# Patient Record
Sex: MALE | Race: WHITE | NOT HISPANIC OR LATINO | Employment: OTHER | URBAN - METROPOLITAN AREA
[De-identification: names, ages, dates, MRNs, and addresses within clinical notes are randomized per-mention and may not be internally consistent; named-entity substitution may affect disease eponyms.]

---

## 2017-02-06 ENCOUNTER — TRANSCRIBE ORDERS (OUTPATIENT)
Dept: LAB | Facility: CLINIC | Age: 72
End: 2017-02-06

## 2017-02-06 ENCOUNTER — APPOINTMENT (OUTPATIENT)
Dept: LAB | Facility: CLINIC | Age: 72
End: 2017-02-06
Payer: MEDICARE

## 2017-02-06 DIAGNOSIS — N18.30 CHRONIC KIDNEY DISEASE, STAGE III (MODERATE) (HCC): Primary | ICD-10-CM

## 2017-02-06 DIAGNOSIS — Z79.899 ENCOUNTER FOR LONG-TERM (CURRENT) USE OF OTHER MEDICATIONS: ICD-10-CM

## 2017-02-06 DIAGNOSIS — I10 ESSENTIAL HYPERTENSION, MALIGNANT: ICD-10-CM

## 2017-02-06 LAB
ANION GAP SERPL CALCULATED.3IONS-SCNC: 4 MMOL/L (ref 4–13)
BUN SERPL-MCNC: 18 MG/DL (ref 5–25)
CALCIUM SERPL-MCNC: 9.2 MG/DL (ref 8.3–10.1)
CHLORIDE SERPL-SCNC: 106 MMOL/L (ref 100–108)
CO2 SERPL-SCNC: 28 MMOL/L (ref 21–32)
CREAT SERPL-MCNC: 1.44 MG/DL (ref 0.6–1.3)
GFR SERPL CREATININE-BSD FRML MDRD: 48.2 ML/MIN/1.73SQ M
GLUCOSE SERPL-MCNC: 103 MG/DL (ref 65–140)
POTASSIUM SERPL-SCNC: 4.1 MMOL/L (ref 3.5–5.3)
PSA SERPL-MCNC: 1.4 NG/ML (ref 0–4)
SODIUM SERPL-SCNC: 138 MMOL/L (ref 136–145)

## 2017-02-06 PROCEDURE — G0103 PSA SCREENING: HCPCS

## 2017-02-06 PROCEDURE — 80048 BASIC METABOLIC PNL TOTAL CA: CPT

## 2017-02-06 PROCEDURE — 36415 COLL VENOUS BLD VENIPUNCTURE: CPT

## 2017-02-13 ENCOUNTER — APPOINTMENT (OUTPATIENT)
Dept: LAB | Facility: CLINIC | Age: 72
End: 2017-02-13
Payer: MEDICARE

## 2017-02-13 DIAGNOSIS — N18.30 CHRONIC KIDNEY DISEASE, STAGE III (MODERATE) (HCC): ICD-10-CM

## 2017-02-13 LAB
ANION GAP SERPL CALCULATED.3IONS-SCNC: 9 MMOL/L (ref 4–13)
BACTERIA UR QL AUTO: NORMAL /HPF
BILIRUB UR QL STRIP: NEGATIVE
BUN SERPL-MCNC: 19 MG/DL (ref 5–25)
CALCIUM SERPL-MCNC: 9.2 MG/DL (ref 8.3–10.1)
CHLORIDE SERPL-SCNC: 107 MMOL/L (ref 100–108)
CLARITY UR: CLEAR
CO2 SERPL-SCNC: 26 MMOL/L (ref 21–32)
COLOR UR: YELLOW
CREAT SERPL-MCNC: 1.4 MG/DL (ref 0.6–1.3)
CREAT UR-MCNC: 97.1 MG/DL
GFR SERPL CREATININE-BSD FRML MDRD: 49.8 ML/MIN/1.73SQ M
GLUCOSE SERPL-MCNC: 91 MG/DL (ref 65–140)
GLUCOSE UR STRIP-MCNC: NEGATIVE MG/DL
HGB UR QL STRIP.AUTO: NEGATIVE
HYALINE CASTS #/AREA URNS LPF: NORMAL /LPF
KETONES UR STRIP-MCNC: NEGATIVE MG/DL
LEUKOCYTE ESTERASE UR QL STRIP: ABNORMAL
NITRITE UR QL STRIP: NEGATIVE
NON-SQ EPI CELLS URNS QL MICRO: NORMAL /HPF
PH UR STRIP.AUTO: 6.5 [PH] (ref 4.5–8)
POTASSIUM SERPL-SCNC: 4.6 MMOL/L (ref 3.5–5.3)
PROT UR STRIP-MCNC: NEGATIVE MG/DL
PROT UR-MCNC: 9 MG/DL
PROT/CREAT UR: 0.09 MG/G{CREAT} (ref 0–0.1)
RBC #/AREA URNS AUTO: NORMAL /HPF
SODIUM SERPL-SCNC: 142 MMOL/L (ref 136–145)
SP GR UR STRIP.AUTO: 1.01 (ref 1–1.03)
UROBILINOGEN UR QL STRIP.AUTO: 0.2 E.U./DL
WBC #/AREA URNS AUTO: NORMAL /HPF

## 2017-02-13 PROCEDURE — 80048 BASIC METABOLIC PNL TOTAL CA: CPT

## 2017-02-13 PROCEDURE — 82570 ASSAY OF URINE CREATININE: CPT

## 2017-02-13 PROCEDURE — 36415 COLL VENOUS BLD VENIPUNCTURE: CPT

## 2017-02-13 PROCEDURE — 84156 ASSAY OF PROTEIN URINE: CPT

## 2017-02-13 PROCEDURE — 81001 URINALYSIS AUTO W/SCOPE: CPT

## 2017-02-14 ENCOUNTER — ALLSCRIPTS OFFICE VISIT (OUTPATIENT)
Dept: OTHER | Facility: OTHER | Age: 72
End: 2017-02-14

## 2017-05-26 ENCOUNTER — APPOINTMENT (OUTPATIENT)
Dept: LAB | Facility: CLINIC | Age: 72
End: 2017-05-26
Payer: MEDICARE

## 2017-05-26 ENCOUNTER — TRANSCRIBE ORDERS (OUTPATIENT)
Dept: LAB | Facility: CLINIC | Age: 72
End: 2017-05-26

## 2017-05-26 DIAGNOSIS — D64.9 ANEMIA, UNSPECIFIED: ICD-10-CM

## 2017-05-26 DIAGNOSIS — N18.30 CHRONIC KIDNEY DISEASE, STAGE III (MODERATE) (HCC): ICD-10-CM

## 2017-05-26 DIAGNOSIS — Z79.899 ENCOUNTER FOR LONG-TERM (CURRENT) USE OF OTHER MEDICATIONS: Primary | ICD-10-CM

## 2017-05-26 DIAGNOSIS — I10 ESSENTIAL HYPERTENSION, MALIGNANT: ICD-10-CM

## 2017-05-26 LAB
ALT SERPL W P-5'-P-CCNC: 21 U/L (ref 12–78)
ANION GAP SERPL CALCULATED.3IONS-SCNC: 5 MMOL/L (ref 4–13)
BUN SERPL-MCNC: 19 MG/DL (ref 5–25)
CALCIUM SERPL-MCNC: 8.8 MG/DL (ref 8.3–10.1)
CHLORIDE SERPL-SCNC: 109 MMOL/L (ref 100–108)
CHOLEST SERPL-MCNC: 169 MG/DL (ref 50–200)
CO2 SERPL-SCNC: 27 MMOL/L (ref 21–32)
CREAT SERPL-MCNC: 1.23 MG/DL (ref 0.6–1.3)
GFR SERPL CREATININE-BSD FRML MDRD: 57.8 ML/MIN/1.73SQ M
GLUCOSE P FAST SERPL-MCNC: 91 MG/DL (ref 65–99)
HCT VFR BLD AUTO: 40.1 % (ref 36.5–49.3)
HDLC SERPL-MCNC: 54 MG/DL (ref 40–60)
HGB BLD-MCNC: 13.2 G/DL (ref 12–17)
LDLC SERPL CALC-MCNC: 97 MG/DL (ref 0–100)
POTASSIUM SERPL-SCNC: 4.6 MMOL/L (ref 3.5–5.3)
SODIUM SERPL-SCNC: 141 MMOL/L (ref 136–145)
TRIGL SERPL-MCNC: 92 MG/DL

## 2017-05-26 PROCEDURE — 36415 COLL VENOUS BLD VENIPUNCTURE: CPT

## 2017-05-26 PROCEDURE — 85018 HEMOGLOBIN: CPT

## 2017-05-26 PROCEDURE — 84460 ALANINE AMINO (ALT) (SGPT): CPT

## 2017-05-26 PROCEDURE — 85014 HEMATOCRIT: CPT

## 2017-05-26 PROCEDURE — 80048 BASIC METABOLIC PNL TOTAL CA: CPT

## 2017-05-26 PROCEDURE — 80061 LIPID PANEL: CPT

## 2017-08-01 DIAGNOSIS — N18.30 CHRONIC KIDNEY DISEASE, STAGE III (MODERATE) (HCC): ICD-10-CM

## 2017-08-01 DIAGNOSIS — N20.0 CALCULUS OF KIDNEY: ICD-10-CM

## 2017-08-01 DIAGNOSIS — D64.9 ANEMIA: ICD-10-CM

## 2017-08-04 ENCOUNTER — APPOINTMENT (OUTPATIENT)
Dept: LAB | Facility: CLINIC | Age: 72
End: 2017-08-04
Payer: MEDICARE

## 2017-08-04 ENCOUNTER — TRANSCRIBE ORDERS (OUTPATIENT)
Dept: LAB | Facility: CLINIC | Age: 72
End: 2017-08-04

## 2017-08-04 DIAGNOSIS — N20.0 CALCULUS OF KIDNEY: ICD-10-CM

## 2017-08-04 DIAGNOSIS — D64.9 ANEMIA: ICD-10-CM

## 2017-08-04 DIAGNOSIS — N18.30 CHRONIC KIDNEY DISEASE, STAGE III (MODERATE) (HCC): ICD-10-CM

## 2017-08-04 LAB
ANION GAP SERPL CALCULATED.3IONS-SCNC: 7 MMOL/L (ref 4–13)
BUN SERPL-MCNC: 23 MG/DL (ref 5–25)
CALCIUM SERPL-MCNC: 8.9 MG/DL (ref 8.3–10.1)
CHLORIDE SERPL-SCNC: 109 MMOL/L (ref 100–108)
CO2 SERPL-SCNC: 23 MMOL/L (ref 21–32)
CREAT SERPL-MCNC: 1.38 MG/DL (ref 0.6–1.3)
CREAT UR-MCNC: 96 MG/DL
GFR SERPL CREATININE-BSD FRML MDRD: 51 ML/MIN/1.73SQ M
GLUCOSE SERPL-MCNC: 93 MG/DL (ref 65–140)
POTASSIUM SERPL-SCNC: 4.4 MMOL/L (ref 3.5–5.3)
PROT UR-MCNC: 9 MG/DL
PROT/CREAT UR: 0.09 MG/G{CREAT} (ref 0–0.1)
SODIUM SERPL-SCNC: 139 MMOL/L (ref 136–145)

## 2017-08-04 PROCEDURE — 82570 ASSAY OF URINE CREATININE: CPT

## 2017-08-04 PROCEDURE — 80048 BASIC METABOLIC PNL TOTAL CA: CPT

## 2017-08-04 PROCEDURE — 84156 ASSAY OF PROTEIN URINE: CPT

## 2017-08-04 PROCEDURE — 36415 COLL VENOUS BLD VENIPUNCTURE: CPT

## 2017-09-22 ENCOUNTER — APPOINTMENT (OUTPATIENT)
Dept: LAB | Facility: CLINIC | Age: 72
End: 2017-09-22
Payer: MEDICARE

## 2017-09-22 ENCOUNTER — TRANSCRIBE ORDERS (OUTPATIENT)
Dept: LAB | Facility: CLINIC | Age: 72
End: 2017-09-22

## 2017-09-22 DIAGNOSIS — N18.30 CHRONIC KIDNEY DISEASE, STAGE III (MODERATE) (HCC): Primary | ICD-10-CM

## 2017-09-22 DIAGNOSIS — D64.9 ANEMIA, UNSPECIFIED: ICD-10-CM

## 2017-09-22 LAB
ALBUMIN SERPL BCP-MCNC: 3.8 G/DL (ref 3.5–5)
ALP SERPL-CCNC: 82 U/L (ref 46–116)
ALT SERPL W P-5'-P-CCNC: 18 U/L (ref 12–78)
ANION GAP SERPL CALCULATED.3IONS-SCNC: 4 MMOL/L (ref 4–13)
AST SERPL W P-5'-P-CCNC: 23 U/L (ref 5–45)
BILIRUB SERPL-MCNC: 0.79 MG/DL (ref 0.2–1)
BUN SERPL-MCNC: 21 MG/DL (ref 5–25)
CALCIUM SERPL-MCNC: 9.2 MG/DL (ref 8.3–10.1)
CHLORIDE SERPL-SCNC: 106 MMOL/L (ref 100–108)
CO2 SERPL-SCNC: 28 MMOL/L (ref 21–32)
CREAT SERPL-MCNC: 1.32 MG/DL (ref 0.6–1.3)
GFR SERPL CREATININE-BSD FRML MDRD: 54 ML/MIN/1.73SQ M
GLUCOSE SERPL-MCNC: 81 MG/DL (ref 65–140)
HCT VFR BLD AUTO: 41.2 % (ref 36.5–49.3)
HGB BLD-MCNC: 13.5 G/DL (ref 12–17)
POTASSIUM SERPL-SCNC: 5.1 MMOL/L (ref 3.5–5.3)
PROT SERPL-MCNC: 7.5 G/DL (ref 6.4–8.2)
SODIUM SERPL-SCNC: 138 MMOL/L (ref 136–145)
URATE SERPL-MCNC: 5.3 MG/DL (ref 4.2–8)

## 2017-09-22 PROCEDURE — 36415 COLL VENOUS BLD VENIPUNCTURE: CPT

## 2017-09-22 PROCEDURE — 84550 ASSAY OF BLOOD/URIC ACID: CPT

## 2017-09-22 PROCEDURE — 85018 HEMOGLOBIN: CPT

## 2017-09-22 PROCEDURE — 80053 COMPREHEN METABOLIC PANEL: CPT

## 2017-09-22 PROCEDURE — 85014 HEMATOCRIT: CPT

## 2017-09-27 ENCOUNTER — ALLSCRIPTS OFFICE VISIT (OUTPATIENT)
Dept: OTHER | Facility: OTHER | Age: 72
End: 2017-09-27

## 2018-01-04 ENCOUNTER — APPOINTMENT (OUTPATIENT)
Dept: LAB | Facility: CLINIC | Age: 73
End: 2018-01-04
Payer: MEDICARE

## 2018-01-04 ENCOUNTER — TRANSCRIBE ORDERS (OUTPATIENT)
Dept: LAB | Facility: CLINIC | Age: 73
End: 2018-01-04

## 2018-01-04 DIAGNOSIS — N41.9 PROSTATITIS, UNSPECIFIED PROSTATITIS TYPE: ICD-10-CM

## 2018-01-04 DIAGNOSIS — D64.89 OTHER SPECIFIED ANEMIAS (CODE): ICD-10-CM

## 2018-01-04 DIAGNOSIS — N18.30 CHRONIC KIDNEY DISEASE, STAGE III (MODERATE) (HCC): Primary | ICD-10-CM

## 2018-01-04 DIAGNOSIS — N39.0 URINARY TRACT INFECTION WITHOUT HEMATURIA, SITE UNSPECIFIED: ICD-10-CM

## 2018-01-04 LAB
ALBUMIN SERPL BCP-MCNC: 4.1 G/DL (ref 3.5–5)
ALP SERPL-CCNC: 86 U/L (ref 46–116)
ALT SERPL W P-5'-P-CCNC: 25 U/L (ref 12–78)
ANION GAP SERPL CALCULATED.3IONS-SCNC: 5 MMOL/L (ref 4–13)
AST SERPL W P-5'-P-CCNC: 26 U/L (ref 5–45)
BASOPHILS # BLD AUTO: 0.01 THOUSANDS/ΜL (ref 0–0.1)
BASOPHILS NFR BLD AUTO: 0 % (ref 0–1)
BILIRUB SERPL-MCNC: 0.6 MG/DL (ref 0.2–1)
BILIRUB UR QL STRIP: NEGATIVE
BUN SERPL-MCNC: 21 MG/DL (ref 5–25)
CALCIUM SERPL-MCNC: 9.2 MG/DL (ref 8.3–10.1)
CHLORIDE SERPL-SCNC: 105 MMOL/L (ref 100–108)
CLARITY UR: CLEAR
CO2 SERPL-SCNC: 27 MMOL/L (ref 21–32)
COLOR UR: YELLOW
CREAT SERPL-MCNC: 1.29 MG/DL (ref 0.6–1.3)
EOSINOPHIL # BLD AUTO: 0.15 THOUSAND/ΜL (ref 0–0.61)
EOSINOPHIL NFR BLD AUTO: 2 % (ref 0–6)
ERYTHROCYTE [DISTWIDTH] IN BLOOD BY AUTOMATED COUNT: 12.9 % (ref 11.6–15.1)
ERYTHROCYTE [SEDIMENTATION RATE] IN BLOOD: 5 MM/HOUR (ref 0–10)
GFR SERPL CREATININE-BSD FRML MDRD: 55 ML/MIN/1.73SQ M
GLUCOSE P FAST SERPL-MCNC: 95 MG/DL (ref 65–99)
GLUCOSE UR STRIP-MCNC: NEGATIVE MG/DL
HCT VFR BLD AUTO: 40.4 % (ref 36.5–49.3)
HGB BLD-MCNC: 13.7 G/DL (ref 12–17)
HGB UR QL STRIP.AUTO: NEGATIVE
KETONES UR STRIP-MCNC: NEGATIVE MG/DL
LEUKOCYTE ESTERASE UR QL STRIP: NEGATIVE
LYMPHOCYTES # BLD AUTO: 1.52 THOUSANDS/ΜL (ref 0.6–4.47)
LYMPHOCYTES NFR BLD AUTO: 22 % (ref 14–44)
MCH RBC QN AUTO: 30.2 PG (ref 26.8–34.3)
MCHC RBC AUTO-ENTMCNC: 33.9 G/DL (ref 31.4–37.4)
MCV RBC AUTO: 89 FL (ref 82–98)
MONOCYTES # BLD AUTO: 0.89 THOUSAND/ΜL (ref 0.17–1.22)
MONOCYTES NFR BLD AUTO: 13 % (ref 4–12)
NEUTROPHILS # BLD AUTO: 4.27 THOUSANDS/ΜL (ref 1.85–7.62)
NEUTS SEG NFR BLD AUTO: 63 % (ref 43–75)
NITRITE UR QL STRIP: NEGATIVE
NRBC BLD AUTO-RTO: 0 /100 WBCS
PH UR STRIP.AUTO: 6 [PH] (ref 4.5–8)
PLATELET # BLD AUTO: 209 THOUSANDS/UL (ref 149–390)
PMV BLD AUTO: 10.8 FL (ref 8.9–12.7)
POTASSIUM SERPL-SCNC: 4.4 MMOL/L (ref 3.5–5.3)
PROT SERPL-MCNC: 7.9 G/DL (ref 6.4–8.2)
PROT UR STRIP-MCNC: NEGATIVE MG/DL
RBC # BLD AUTO: 4.54 MILLION/UL (ref 3.88–5.62)
SODIUM SERPL-SCNC: 137 MMOL/L (ref 136–145)
SP GR UR STRIP.AUTO: 1.01 (ref 1–1.03)
UROBILINOGEN UR QL STRIP.AUTO: 0.2 E.U./DL
WBC # BLD AUTO: 6.85 THOUSAND/UL (ref 4.31–10.16)

## 2018-01-04 PROCEDURE — 85652 RBC SED RATE AUTOMATED: CPT

## 2018-01-04 PROCEDURE — 80053 COMPREHEN METABOLIC PANEL: CPT

## 2018-01-04 PROCEDURE — 85025 COMPLETE CBC W/AUTO DIFF WBC: CPT

## 2018-01-04 PROCEDURE — 36415 COLL VENOUS BLD VENIPUNCTURE: CPT

## 2018-01-04 PROCEDURE — 81003 URINALYSIS AUTO W/O SCOPE: CPT

## 2018-01-10 NOTE — PROGRESS NOTES
Assessment    1  Anemia (285 9) (D64 9)   2  Acute tubular necrosis (584 5) (N17 0)    Plan  Acute tubular necrosis    · Follow-up PRN Evaluation and Treatment  Follow-up  Status: Complete  Done:  [id number] 11:13AM   Ordered; For: Acute tubular necrosis; Ordered By: Howie Sims Performed:  Due: [id number]    Discussion/Summary  Discussion Summary:   In review this is a 48-year-old male who presented to the hematology office for evaluation of anemia in the setting of renal dysfunction  Mr Howard Palma is not symptomatic and recent blood work shows a normal hemoglobin, and normal erythropoietin function  Anemia workup has been completed and was found to be benign  The patient's renal functions are still slightly out of range and he notes regular follow-up with nephrology  At this point the patient can follow up with nephrology and primary care for further evaluation of hemoglobin  I reviewed symptoms of anemia  He knows he can call the office with any hematological questions and concerns  No follow-up with hematology is needed at this time however if the anemia returns he is encouraged to call and make a follow-up appointment  The patient understands and is in agreement with my plan  Carefully review your medication list and verify that the list is accurate and up-to-date  Please call the hematology/oncology office if there are medications missing from the list, medications on the list that you are not currently taking or if there is a dosage or instruction that is different from how you're taking a medication  Medication SE Review and Pt Understands Tx: Possible side effects of new medications were reviewed with the patient/guardian today  Chief Complaint  Chief Complaint: Chief Complaint:   The patient presents to the office today with Follow-up anemia        History of Present Illness  HPI: This is a 48-year-old male previously referred for evaluation of anemia after acute elevation of creatinine in June 2016  Recent blood work demonstrated a decreased hemoglobin level  Mr Irving Urena was recently found to have renal abnormalities, he follows regularly with nephrology  It was noted that during this workup he had a normocytic anemia for which she was referred to hematology for further workup  This workup included evaluation for plasma cell dyscrasia, iron deficiency anemia, mineral deficiencies, hemolytic anemia, anemia of chronic disease as well as autoimmune disorders  All studies have returned within normal limits and his hemoglobin has spontaneously recovered  Today the patient notes feeling well, at his previous baseline  The patient denies symptoms of anemia and states that he never had any symptoms  He notes he continues to hike weekly without muscle cramping or shortness of breath  He denies chest pain and shortness of breath and lower extremity edema  He denies GI problems  He notes his increase his fluid consumption which has increased his trip to the bathroom  Patient notes that his BPH is under control on Flomax  All other review of systems were negative  Patient states feeling well, close to baseline  Mr Irving Urena denies any prior history of CBC abnormalities  Appetite is good, weight is stable  Patient likes to hike (5-10 miles at a time)  Patient denies any problems with shortness of breath or dyspnea on exertion  No problems with excessive bruising or bleeding  Appetite is good, weight is stable  No other active medical problems  Current Therapy: None      Review of Systems  Complete-Male:   Constitutional: as noted in HPI  Eyes: No complaints of eye pain, no red eyes, no discharge from eyes, no itchy eyes  ENT: no complaints of earache, no hearing loss, no nosebleeds, no nasal discharge, no sore throat, no hoarseness  Cardiovascular: No complaints of slow heart rate, no fast heart rate, no chest pain, no palpitations, no leg claudication, no lower extremity     Respiratory: No complaints of shortness of breath, no wheezing, no cough, no SOB on exertion, no orthopnea or PND  Gastrointestinal: No complaints of abdominal pain, no constipation, no nausea or vomiting, no diarrhea or bloody stools  Genitourinary: No complaints of dysuria, no incontinence, no hesitancy, no nocturia, no genital lesion, no testicular pain  Musculoskeletal: No complaints of arthralgia, no myalgias, no joint swelling or stiffness, no limb pain or swelling  Integumentary: No complaints of skin rash or skin lesions, no itching, no skin wound, no dry skin  Neurological: No compliants of headache, no confusion, no convulsions, no numbness or tingling, no dizziness or fainting, no limb weakness, no difficulty walking  Psychiatric: Is not suicidal, no sleep disturbances, no anxiety or depression, no change in personality, no emotional problems  Endocrine: No complaints of proptosis, no hot flashes, no muscle weakness, no erectile dysfunction, no deepening of the voice, no feelings of weakness  Hematologic/Lymphatic: No complaints of swollen glands, no swollen glands in the neck, does not bleed easily, no easy bruising  ROS Reviewed:   ROS reviewed  Active Problems    1  Acute tubular necrosis (584 5) (N17 0)   2  Anemia (285 9) (D64 9)   3  CKD (chronic kidney disease), stage III (585 3) (N18 3)   4  Heart burn (787 1) (R12)   5  Nephrolithiasis (592 0) (N20 0)    Past Medical History    1  History of renal calculi (V13 01) (F25 210)  Active Problems And Past Medical History Reviewed: The active problems and past medical history were reviewed and updated today       As above, BPH, anxiety, benign proximal positional vertigo, hypercholesterolemia, allergic rhinitis, reflux disease, Shin's esophagus, basal cell carcinoma on back, normal childhood illnesses and vaccinations        Surgical History      Removal of basal cell carcinoma lesions - no complications, no prior blood transfusions, prior lithotripsy   Surgical History Reviewed: The surgical history was reviewed and updated today  Family History  Mother    1  Family history of malignant neoplasm of breast (V16 3) (Z80 3)  Father    2  FHx: heart disease (V17 49) (Z82 49)  Family History Reviewed: The family history was reviewed and updated today  No known familial or genetic diseases, 2 children alive and well, no family history of anemia        Social History    · Alcohol use (V49 89) (Z78 9)   · Always uses seat belt   · Never a smoker   · Wears helmet with cycling  Social History Reviewed: The social history was reviewed and updated today  , retired, patient used to work in a [employer] and was exposed to a number of chemicals and fumes but OSHA guidelines were in place, no tobacco, alcohol or drug abuse        Current Meds   1  Flomax 0 4 MG Oral Capsule (Tamsulosin HCl); 1 tablet daily; Therapy: (Recorded:[id number]) to Recorded    Allergies    1  No Known Drug Allergies    Vitals  Vital Signs    Recorded: 21QTJ2575 60:48SZ   Systolic 648   Diastolic 80   Heart Rate 71   Respiration 16   Temperature 97 9 F   O2 Saturation 99   Height 5 ft 10 in   Weight 158 lb 8 oz   BMI Calculated 22 74   BSA Calculated 1 89     Physical Exam    Constitutional   General appearance: No acute distress, well appearing and well nourished  Eyes   Conjunctiva and lids: No swelling, erythema, or discharge  Pupils and irises: Equal, round and reactive to light  Ears, Nose, Mouth, and Throat   External inspection of ears and nose: Normal     Otoscopic examination: Tympanic membrance translucent with normal light reflex  Canals patent without erythema  Nasal mucosa, septum, and turbinates: Normal without edema or erythema  Oropharynx: Normal with no erythema, edema, exudate or lesions  Pulmonary   Respiratory effort: No increased work of breathing or signs of respiratory distress      Auscultation of lungs: Clear to auscultation, equal breath sounds bilaterally, no wheezes, no rales, no rhonci  Cardiovascular   Palpation of heart: Normal PMI, no thrills  Auscultation of heart: Normal rate and rhythm, normal S1 and S2, without murmurs  Examination of extremities for edema and/or varicosities: Normal     Carotid pulses: Normal     Abdomen   Abdomen: Non-tender, no masses  Liver and spleen: No hepatomegaly or splenomegaly  Lymphatic No adenopathy in the neck, supra-clavicular region, axilla and groin bilaterally  Musculoskeletal   Gait and station: Normal     Digits and nails: Normal without clubbing or cyanosis  Inspection/palpation of joints, bones, and muscles: Normal     Skin Good color, no petechiae or ecchymoses  Neurologic   Cranial nerves: Cranial nerves 2-12 intact  Reflexes: 2+ and symmetric  Sensation: No sensory loss  Psychiatric   Orientation to person, place and time: Normal     Mood and affect: Normal          Results/Data  Results Form:   Results   Lab Results 10/24/16 WBC = 7 09, hemoglobin = 13 9, hematocrit = 41 9, platelet count 361, BUN = 19, creatinine = 1 57, LFTs WNL, BILL = negative, LDH = 204, sedimentation rate = 7, ferritin = 232, folate = greater than 20, iron saturation = 29%, iron = 101, TIBC = 349, signed 2 3, erythropoietin = 7 7, haptoglobin = 163, cry globin = none detected at 72 hours  7/14/16 WBC = 6 7 hemoglobin = 11 2 hematocrit = 33 4 MCV = 91 RDW = 13 4 platelet = 882 eosinophil = 4% BUN = 24 creatinine = 1 77 G4 = 38 mL/minute LFTs WNL calcium = 9 0 total protein = 7 8 how [place]eman = 3 8 B 12 = 371 ferritin = 369 iron = 80 TIBC = 300 SPEP: Polyclonal increase in the gamma region but no monoclonal bands noted  7/5/16 occult blood stool negative x 3  6/23/16 immunofixation did not demonstrate a monoclonal gammopathy  6/23/16 urinalysis without blood or bacteria  6/23/16 sedimentation rate = 23 TWAN screen negative  6/20/16 LDH = 156 reticulocyte count = 1%  Signatures   Electronically signed by : Wayne Frankel, PAC; Nov 2 2016 11:20AM EST                       (Author)

## 2018-01-14 VITALS
BODY MASS INDEX: 21.19 KG/M2 | SYSTOLIC BLOOD PRESSURE: 120 MMHG | DIASTOLIC BLOOD PRESSURE: 74 MMHG | HEIGHT: 70 IN | WEIGHT: 148 LBS

## 2018-01-14 VITALS
BODY MASS INDEX: 22.55 KG/M2 | WEIGHT: 157.5 LBS | DIASTOLIC BLOOD PRESSURE: 66 MMHG | HEIGHT: 70 IN | SYSTOLIC BLOOD PRESSURE: 112 MMHG

## 2018-03-01 ENCOUNTER — APPOINTMENT (OUTPATIENT)
Dept: LAB | Facility: CLINIC | Age: 73
End: 2018-03-01
Payer: MEDICARE

## 2018-03-01 DIAGNOSIS — N18.30 CHRONIC KIDNEY DISEASE, STAGE III (MODERATE) (HCC): ICD-10-CM

## 2018-03-01 LAB
ANION GAP SERPL CALCULATED.3IONS-SCNC: 7 MMOL/L (ref 4–13)
BUN SERPL-MCNC: 22 MG/DL (ref 5–25)
CALCIUM SERPL-MCNC: 8.8 MG/DL (ref 8.3–10.1)
CHLORIDE SERPL-SCNC: 108 MMOL/L (ref 100–108)
CO2 SERPL-SCNC: 25 MMOL/L (ref 21–32)
CREAT SERPL-MCNC: 1.25 MG/DL (ref 0.6–1.3)
CREAT UR-MCNC: 158 MG/DL
GFR SERPL CREATININE-BSD FRML MDRD: 57 ML/MIN/1.73SQ M
GLUCOSE P FAST SERPL-MCNC: 87 MG/DL (ref 65–99)
PHOSPHATE SERPL-MCNC: 2.9 MG/DL (ref 2.3–4.1)
POTASSIUM SERPL-SCNC: 4.4 MMOL/L (ref 3.5–5.3)
PROT UR-MCNC: 17 MG/DL
PROT/CREAT UR: 0.11 MG/G{CREAT} (ref 0–0.1)
SODIUM SERPL-SCNC: 140 MMOL/L (ref 136–145)

## 2018-03-01 PROCEDURE — 84100 ASSAY OF PHOSPHORUS: CPT

## 2018-03-01 PROCEDURE — 80048 BASIC METABOLIC PNL TOTAL CA: CPT

## 2018-03-01 PROCEDURE — 82570 ASSAY OF URINE CREATININE: CPT

## 2018-03-01 PROCEDURE — 36415 COLL VENOUS BLD VENIPUNCTURE: CPT

## 2018-03-01 PROCEDURE — 84156 ASSAY OF PROTEIN URINE: CPT

## 2018-03-13 ENCOUNTER — OFFICE VISIT (OUTPATIENT)
Dept: NEPHROLOGY | Facility: CLINIC | Age: 73
End: 2018-03-13
Payer: MEDICARE

## 2018-03-13 VITALS
BODY MASS INDEX: 21.9 KG/M2 | SYSTOLIC BLOOD PRESSURE: 108 MMHG | HEIGHT: 70 IN | WEIGHT: 153 LBS | DIASTOLIC BLOOD PRESSURE: 60 MMHG

## 2018-03-13 DIAGNOSIS — N18.30 CKD (CHRONIC KIDNEY DISEASE), STAGE III (HCC): Primary | ICD-10-CM

## 2018-03-13 DIAGNOSIS — N20.0 NEPHROLITHIASIS: ICD-10-CM

## 2018-03-13 PROCEDURE — 99213 OFFICE O/P EST LOW 20 MIN: CPT | Performed by: INTERNAL MEDICINE

## 2018-03-13 RX ORDER — FAMOTIDINE 40 MG/1
40 TABLET, FILM COATED ORAL DAILY
COMMUNITY
End: 2019-09-05 | Stop reason: SDUPTHER

## 2018-03-13 NOTE — PROGRESS NOTES
NEPHROLOGY OUTPATIENT PROGRESS NOTE   Kashif Rojas 67 y o  male MRN: [id number]  DATE: 3/13/2018  Reason for visit:   Chief Complaint   Patient presents with    Follow-up    Chronic Kidney Disease     ASSESSMENT and PLAN:  Likely CK D stage III with serum creatinine baseline 1 3 -1 4  - CK D could be secondary to previous long-term use of acyclovir/omeprazole with episode of JOSEPH causing residual damage  Could have possible chronic interstitial nephritis from the previous insults  - Now creatinine stable at 1 2 which is at baseline  - We will repeat BMP, UPC ratio before next visit  - Continue to drink plenty of fluid  - Urinalysis done in January 2018 was bland without hematuria or proteinuria  UPC ratio 110 mg, nonsignificant   - Renal ultrasound in June 2016 showed normal size kidneys, normal echogenicity, no hydronephrosis, bilateral renal cysts present  - Avoid nephrotoxicity or NSAIDs    BPH, on Flomax    Blood pressure is well controlled in the office today  He has no history of hypertension or diabetes  history of nephrolithiasis  No recent episode of stone  He denies any hematuria or flank pain  Continue to drink plenty of fluid  Continue to monitor  Diagnoses and all orders for this visit:    CKD (chronic kidney disease), stage III  -     Basic metabolic panel; Future  -     CBC; Future  -     Phosphorus; Future  -     Protein / creatinine ratio, urine; Future    Nephrolithiasis    Other orders  -     famotidine (PEPCID) 40 MG tablet; Take 40 mg by mouth daily          SUBJECTIVE / HPI:  Patient is 76-year-old male with past medical history of BPH, history of kidney stones total 2 episodes, first episode in 1989 and second episode after couple years requiring lithotripsy, no history of hypertension or diabetes, CK D stage III with baseline creatinine 1 3-1 4, previous creatinine 1 1 in January 2016, comes for regular follow-up of his renal failure  new urinary complaints since last visit   No recent NSAID exposure  He tries to keep himself hydrated  No chest pain, no shortness of breath, denies any nausea, vomiting or diarrhea  He takes only Flomax 1 tablet daily as his prescribed medication  REVIEW OF SYSTEMS:    Review of Systems   Constitutional: Negative for chills, fatigue and fever  HENT: Negative for congestion, ear pain and postnasal drip  Eyes: Negative for redness and visual disturbance  Respiratory: Negative for cough and shortness of breath  Cardiovascular: Negative for chest pain and leg swelling  Gastrointestinal: Negative for abdominal pain, diarrhea, nausea and vomiting  Endocrine: Negative for polyuria  Genitourinary: Negative for decreased urine volume, difficulty urinating, dysuria, frequency, hematuria and urgency  Musculoskeletal: Negative for arthralgias and back pain  Skin: Negative for rash  Neurological: Negative for dizziness, light-headedness and headaches  Hematological: Does not bruise/bleed easily  Psychiatric/Behavioral: Negative for confusion  The patient is not nervous/anxious  More than 10 point review of systems were obtained and discussed in length with the patient  Complete review of systems were negative / unremarkable except mentioned above  PHYSICAL EXAM:  Vitals:    03/13/18 0754   Weight: 69 4 kg (153 lb)   Height: 5' 10" (1 778 m)     Body mass index is 21 95 kg/m²  Physical Exam   Constitutional: He is oriented to person, place, and time  He appears well-developed and well-nourished  HENT:   Head: Normocephalic and atraumatic  Right Ear: External ear normal    Left Ear: External ear normal    Nose: Nose normal    Eyes: Conjunctivae and EOM are normal  Pupils are equal, round, and reactive to light  No scleral icterus  Neck: Neck supple  No JVD present  Cardiovascular: Normal rate and normal heart sounds  Pulmonary/Chest: Effort normal and breath sounds normal  No respiratory distress  He has no wheezes   He has no rales    Abdominal: Soft  Bowel sounds are normal  He exhibits no distension  There is no tenderness  Musculoskeletal: He exhibits no edema or tenderness  Neurological: He is alert and oriented to person, place, and time  Skin: Skin is warm and dry  Psychiatric: He has a normal mood and affect  His behavior is normal    Vitals reviewed  PAST MEDICAL HISTORY:  Past Medical History:   Diagnosis Date    BPH (benign prostatic hyperplasia)     GERD (gastroesophageal reflux disease)     Herpes        PAST SURGICAL HISTORY:  No past surgical history on file  SOCIAL HISTORY:  History   Alcohol Use    0 6 - 1 2 oz/week    1 - 2 Glasses of wine per week     Comment: occas     History   Drug Use No     History   Smoking Status    Never Smoker   Smokeless Tobacco    Never Used       FAMILY HISTORY:  No family history on file  MEDICATIONS:    Current Outpatient Prescriptions:     omeprazole (PriLOSEC) 20 mg delayed release capsule, Take 20 mg by mouth daily  , Disp: , Rfl:     tamsulosin (FLOMAX) 0 4 mg, Take 0 4 mg by mouth daily with dinner , Disp: , Rfl:     Lab Results:   Results for orders placed or performed in visit on 86/78/14   Basic metabolic panel   Result Value Ref Range    Sodium 140 136 - 145 mmol/L    Potassium 4 4 3 5 - 5 3 mmol/L    Chloride 108 100 - 108 mmol/L    CO2 25 21 - 32 mmol/L    Anion Gap 7 4 - 13 mmol/L    BUN 22 5 - 25 mg/dL    Creatinine 1 25 0 60 - 1 30 mg/dL    Glucose, Fasting 87 65 - 99 mg/dL    Calcium 8 8 8 3 - 10 1 mg/dL    eGFR 57 ml/min/1 73sq m   Protein / creatinine ratio, urine   Result Value Ref Range    Creatinine, Ur 158 0 mg/dL    Protein Urine Random 17 mg/dL    Prot/Creat Ratio, Ur 0 11 (H) 0 00 - 0 10   Phosphorus   Result Value Ref Range    Phosphorus 2 9 2 3 - 4 1 mg/dL

## 2018-03-13 NOTE — LETTER
March 13, 2018     [facility]  [address] [place] [address]    Patient: Mehdi Choudhary   YOB: 1945   Date of Visit: 3/13/2018       Dear Dr Simpson Mt: Thank you for referring Mehdi Choudhary to me for evaluation  Below are my notes for this consultation  If you have questions, please do not hesitate to call me  I look forward to following your patient along with you  Sincerely,        Chaitanya Terrell MD        CC: No Recipients  Chaitanya Terrell MD  3/13/2018 10:51 AM  Sign at close encounter  Michael Cardenas 67 y o  male MRN: [id number]  DATE: 3/13/2018  Reason for visit:   Chief Complaint   Patient presents with    Follow-up    Chronic Kidney Disease     ASSESSMENT and PLAN:  Likely CK D stage III with serum creatinine baseline 1 3 -1 4  - CK D could be secondary to previous long-term use of acyclovir/omeprazole with episode of JOSEPH causing residual damage  Could have possible chronic interstitial nephritis from the previous insults  - Now creatinine stable at 1 2 which is at baseline  - We will repeat BMP, UPC ratio before next visit  - Continue to drink plenty of fluid  - Urinalysis done in January 2018 was bland without hematuria or proteinuria  UPC ratio 110 mg, nonsignificant   - Renal ultrasound in June 2016 showed normal size kidneys, normal echogenicity, no hydronephrosis, bilateral renal cysts present  - Avoid nephrotoxicity or NSAIDs    BPH, on Flomax    Blood pressure is well controlled in the office today  He has no history of hypertension or diabetes  history of nephrolithiasis  No recent episode of stone  He denies any hematuria or flank pain  Continue to drink plenty of fluid  Continue to monitor  Diagnoses and all orders for this visit:    CKD (chronic kidney disease), stage III  -     Basic metabolic panel; Future  -     CBC; Future  -     Phosphorus;  Future  -     Protein / creatinine ratio, urine; Future    Nephrolithiasis    Other orders  -     famotidine (PEPCID) 40 MG tablet; Take 40 mg by mouth daily          SUBJECTIVE / HPI:  Patient is 42-year-old male with past medical history of BPH, history of kidney stones total 2 episodes, first episode in 1989 and second episode after couple years requiring lithotripsy, no history of hypertension or diabetes, CK D stage III with baseline creatinine 1 3-1 4, previous creatinine 1 1 in January 2016, comes for regular follow-up of his renal failure  new urinary complaints since last visit  No recent NSAID exposure  He tries to keep himself hydrated  No chest pain, no shortness of breath, denies any nausea, vomiting or diarrhea  He takes only Flomax 1 tablet daily as his prescribed medication  REVIEW OF SYSTEMS:    Review of Systems   Constitutional: Negative for chills, fatigue and fever  HENT: Negative for congestion, ear pain and postnasal drip  Eyes: Negative for redness and visual disturbance  Respiratory: Negative for cough and shortness of breath  Cardiovascular: Negative for chest pain and leg swelling  Gastrointestinal: Negative for abdominal pain, diarrhea, nausea and vomiting  Endocrine: Negative for polyuria  Genitourinary: Negative for decreased urine volume, difficulty urinating, dysuria, frequency, hematuria and urgency  Musculoskeletal: Negative for arthralgias and back pain  Skin: Negative for rash  Neurological: Negative for dizziness, light-headedness and headaches  Hematological: Does not bruise/bleed easily  Psychiatric/Behavioral: Negative for confusion  The patient is not nervous/anxious  More than 10 point review of systems were obtained and discussed in length with the patient  Complete review of systems were negative / unremarkable except mentioned above  PHYSICAL EXAM:  Vitals:    03/13/18 0754   Weight: 69 4 kg (153 lb)   Height: 5' 10" (1 778 m)     Body mass index is 21 95 kg/m²      Physical Exam Constitutional: He is oriented to person, place, and time  He appears well-developed and well-nourished  HENT:   Head: Normocephalic and atraumatic  Right Ear: External ear normal    Left Ear: External ear normal    Nose: Nose normal    Eyes: Conjunctivae and EOM are normal  Pupils are equal, round, and reactive to light  No scleral icterus  Neck: Neck supple  No JVD present  Cardiovascular: Normal rate and normal heart sounds  Pulmonary/Chest: Effort normal and breath sounds normal  No respiratory distress  He has no wheezes  He has no rales  Abdominal: Soft  Bowel sounds are normal  He exhibits no distension  There is no tenderness  Musculoskeletal: He exhibits no edema or tenderness  Neurological: He is alert and oriented to person, place, and time  Skin: Skin is warm and dry  Psychiatric: He has a normal mood and affect  His behavior is normal    Vitals reviewed  PAST MEDICAL HISTORY:  Past Medical History:   Diagnosis Date    BPH (benign prostatic hyperplasia)     GERD (gastroesophageal reflux disease)     Herpes        PAST SURGICAL HISTORY:  No past surgical history on file  SOCIAL HISTORY:  History   Alcohol Use    0 6 - 1 2 oz/week    1 - 2 Glasses of wine per week     Comment: occas     History   Drug Use No     History   Smoking Status    Never Smoker   Smokeless Tobacco    Never Used       FAMILY HISTORY:  No family history on file  MEDICATIONS:    Current Outpatient Prescriptions:     omeprazole (PriLOSEC) 20 mg delayed release capsule, Take 20 mg by mouth daily  , Disp: , Rfl:     tamsulosin (FLOMAX) 0 4 mg, Take 0 4 mg by mouth daily with dinner , Disp: , Rfl:     Lab Results:   Results for orders placed or performed in visit on 67/77/48   Basic metabolic panel   Result Value Ref Range    Sodium 140 136 - 145 mmol/L    Potassium 4 4 3 5 - 5 3 mmol/L    Chloride 108 100 - 108 mmol/L    CO2 25 21 - 32 mmol/L    Anion Gap 7 4 - 13 mmol/L    BUN 22 5 - 25 mg/dL Creatinine 1 25 0 60 - 1 30 mg/dL    Glucose, Fasting 87 65 - 99 mg/dL    Calcium 8 8 8 3 - 10 1 mg/dL    eGFR 57 ml/min/1 73sq m   Protein / creatinine ratio, urine   Result Value Ref Range    Creatinine, Ur 158 0 mg/dL    Protein Urine Random 17 mg/dL    Prot/Creat Ratio, Ur 0 11 (H) 0 00 - 0 10   Phosphorus   Result Value Ref Range    Phosphorus 2 9 2 3 - 4 1 mg/dL

## 2018-03-19 ENCOUNTER — APPOINTMENT (OUTPATIENT)
Dept: LAB | Facility: CLINIC | Age: 73
End: 2018-03-19
Payer: MEDICARE

## 2018-03-19 ENCOUNTER — TRANSCRIBE ORDERS (OUTPATIENT)
Dept: LAB | Facility: CLINIC | Age: 73
End: 2018-03-19

## 2018-03-19 DIAGNOSIS — R35.1 NOCTURIA: Primary | ICD-10-CM

## 2018-03-19 LAB — PSA SERPL-MCNC: 1.6 NG/ML (ref 0–4)

## 2018-03-19 PROCEDURE — 36415 COLL VENOUS BLD VENIPUNCTURE: CPT

## 2018-03-19 PROCEDURE — G0103 PSA SCREENING: HCPCS

## 2018-07-18 ENCOUNTER — TRANSCRIBE ORDERS (OUTPATIENT)
Dept: LAB | Facility: CLINIC | Age: 73
End: 2018-07-18

## 2018-07-18 ENCOUNTER — APPOINTMENT (OUTPATIENT)
Dept: LAB | Facility: CLINIC | Age: 73
End: 2018-07-18
Payer: MEDICARE

## 2018-07-18 DIAGNOSIS — E78.00 PURE HYPERCHOLESTEROLEMIA: ICD-10-CM

## 2018-07-18 DIAGNOSIS — E55.9 VITAMIN D DEFICIENCY: Primary | ICD-10-CM

## 2018-07-18 DIAGNOSIS — N18.30 CHRONIC KIDNEY DISEASE, STAGE III (MODERATE) (HCC): ICD-10-CM

## 2018-07-18 DIAGNOSIS — A69.20 LYME DISEASE: ICD-10-CM

## 2018-07-18 LAB
25(OH)D3 SERPL-MCNC: 24.7 NG/ML (ref 30–100)
ANION GAP SERPL CALCULATED.3IONS-SCNC: 4 MMOL/L (ref 4–13)
BUN SERPL-MCNC: 18 MG/DL (ref 5–25)
CALCIUM SERPL-MCNC: 8.6 MG/DL (ref 8.3–10.1)
CHLORIDE SERPL-SCNC: 108 MMOL/L (ref 100–108)
CHOLEST SERPL-MCNC: 158 MG/DL (ref 50–200)
CO2 SERPL-SCNC: 27 MMOL/L (ref 21–32)
CREAT SERPL-MCNC: 1.28 MG/DL (ref 0.6–1.3)
GFR SERPL CREATININE-BSD FRML MDRD: 55 ML/MIN/1.73SQ M
GLUCOSE P FAST SERPL-MCNC: 90 MG/DL (ref 65–99)
HDLC SERPL-MCNC: 44 MG/DL (ref 40–60)
LDLC SERPL CALC-MCNC: 96 MG/DL (ref 0–100)
NONHDLC SERPL-MCNC: 114 MG/DL
POTASSIUM SERPL-SCNC: 4.2 MMOL/L (ref 3.5–5.3)
PSA SERPL-MCNC: 2.1 NG/ML (ref 0–4)
SODIUM SERPL-SCNC: 139 MMOL/L (ref 136–145)
TRIGL SERPL-MCNC: 88 MG/DL

## 2018-07-18 PROCEDURE — 80061 LIPID PANEL: CPT

## 2018-07-18 PROCEDURE — 82306 VITAMIN D 25 HYDROXY: CPT

## 2018-07-18 PROCEDURE — G0103 PSA SCREENING: HCPCS

## 2018-07-18 PROCEDURE — 36415 COLL VENOUS BLD VENIPUNCTURE: CPT

## 2018-07-18 PROCEDURE — 86618 LYME DISEASE ANTIBODY: CPT

## 2018-07-18 PROCEDURE — 80048 BASIC METABOLIC PNL TOTAL CA: CPT

## 2018-07-20 LAB
B BURGDOR IGG SER IA-ACNC: 0.09
B BURGDOR IGM SER IA-ACNC: 0.15

## 2018-09-03 ENCOUNTER — TELEPHONE (OUTPATIENT)
Dept: OTHER | Facility: HOSPITAL | Age: 73
End: 2018-09-03

## 2018-09-03 NOTE — TELEPHONE ENCOUNTER
Can you have him take vitamin D 1000 units one tablet po daily? His vitamin D level is slight lower

## 2018-10-22 ENCOUNTER — TRANSCRIBE ORDERS (OUTPATIENT)
Dept: LAB | Facility: CLINIC | Age: 73
End: 2018-10-22

## 2018-10-22 ENCOUNTER — APPOINTMENT (OUTPATIENT)
Dept: LAB | Facility: CLINIC | Age: 73
End: 2018-10-22
Payer: MEDICARE

## 2018-10-22 DIAGNOSIS — N18.2 CHRONIC KIDNEY DISEASE, STAGE II (MILD): Primary | ICD-10-CM

## 2018-10-22 DIAGNOSIS — N18.2 CHRONIC KIDNEY DISEASE, STAGE II (MILD): ICD-10-CM

## 2018-10-22 LAB
ANION GAP SERPL CALCULATED.3IONS-SCNC: 4 MMOL/L (ref 4–13)
BUN SERPL-MCNC: 15 MG/DL (ref 5–25)
CALCIUM SERPL-MCNC: 8.8 MG/DL (ref 8.3–10.1)
CHLORIDE SERPL-SCNC: 107 MMOL/L (ref 100–108)
CO2 SERPL-SCNC: 28 MMOL/L (ref 21–32)
CREAT SERPL-MCNC: 1.29 MG/DL (ref 0.6–1.3)
GFR SERPL CREATININE-BSD FRML MDRD: 55 ML/MIN/1.73SQ M
GLUCOSE P FAST SERPL-MCNC: 86 MG/DL (ref 65–99)
HCT VFR BLD AUTO: 42.7 % (ref 36.5–49.3)
HGB BLD-MCNC: 13.5 G/DL (ref 12–17)
POTASSIUM SERPL-SCNC: 5.1 MMOL/L (ref 3.5–5.3)
SODIUM SERPL-SCNC: 139 MMOL/L (ref 136–145)

## 2018-10-22 PROCEDURE — 80048 BASIC METABOLIC PNL TOTAL CA: CPT

## 2018-10-22 PROCEDURE — 85018 HEMOGLOBIN: CPT

## 2018-10-22 PROCEDURE — 85014 HEMATOCRIT: CPT

## 2018-10-22 PROCEDURE — 36415 COLL VENOUS BLD VENIPUNCTURE: CPT

## 2019-01-24 ENCOUNTER — TRANSCRIBE ORDERS (OUTPATIENT)
Dept: LAB | Facility: CLINIC | Age: 74
End: 2019-01-24

## 2019-01-24 ENCOUNTER — APPOINTMENT (OUTPATIENT)
Dept: LAB | Facility: CLINIC | Age: 74
End: 2019-01-24
Payer: MEDICARE

## 2019-01-24 DIAGNOSIS — N18.30 CHRONIC KIDNEY DISEASE, STAGE III (MODERATE) (HCC): Primary | ICD-10-CM

## 2019-01-24 LAB
ANION GAP SERPL CALCULATED.3IONS-SCNC: 6 MMOL/L (ref 4–13)
BUN SERPL-MCNC: 18 MG/DL (ref 5–25)
CALCIUM SERPL-MCNC: 8.8 MG/DL (ref 8.3–10.1)
CHLORIDE SERPL-SCNC: 108 MMOL/L (ref 100–108)
CO2 SERPL-SCNC: 26 MMOL/L (ref 21–32)
CREAT SERPL-MCNC: 1.43 MG/DL (ref 0.6–1.3)
GFR SERPL CREATININE-BSD FRML MDRD: 48 ML/MIN/1.73SQ M
GLUCOSE SERPL-MCNC: 78 MG/DL (ref 65–140)
POTASSIUM SERPL-SCNC: 4.1 MMOL/L (ref 3.5–5.3)
SODIUM SERPL-SCNC: 140 MMOL/L (ref 136–145)

## 2019-01-24 PROCEDURE — 36415 COLL VENOUS BLD VENIPUNCTURE: CPT

## 2019-01-24 PROCEDURE — 80048 BASIC METABOLIC PNL TOTAL CA: CPT

## 2019-01-30 ENCOUNTER — TELEPHONE (OUTPATIENT)
Dept: NEPHROLOGY | Facility: CLINIC | Age: 74
End: 2019-01-30

## 2019-02-05 ENCOUNTER — TELEPHONE (OUTPATIENT)
Dept: NEPHROLOGY | Facility: CLINIC | Age: 74
End: 2019-02-05

## 2019-02-05 NOTE — TELEPHONE ENCOUNTER
Can you let him know that his creatinine slightly increased at 1 4 although overall stable near to his baseline  No changes for now

## 2019-03-14 ENCOUNTER — APPOINTMENT (OUTPATIENT)
Dept: LAB | Facility: CLINIC | Age: 74
End: 2019-03-14
Payer: MEDICARE

## 2019-03-14 DIAGNOSIS — N18.30 CKD (CHRONIC KIDNEY DISEASE), STAGE III (HCC): ICD-10-CM

## 2019-03-14 LAB
ANION GAP SERPL CALCULATED.3IONS-SCNC: 3 MMOL/L (ref 4–13)
BUN SERPL-MCNC: 20 MG/DL (ref 5–25)
CALCIUM SERPL-MCNC: 9 MG/DL (ref 8.3–10.1)
CHLORIDE SERPL-SCNC: 108 MMOL/L (ref 100–108)
CO2 SERPL-SCNC: 27 MMOL/L (ref 21–32)
CREAT SERPL-MCNC: 1.4 MG/DL (ref 0.6–1.3)
CREAT UR-MCNC: 120 MG/DL
ERYTHROCYTE [DISTWIDTH] IN BLOOD BY AUTOMATED COUNT: 12.6 % (ref 11.6–15.1)
GFR SERPL CREATININE-BSD FRML MDRD: 49 ML/MIN/1.73SQ M
GLUCOSE P FAST SERPL-MCNC: 90 MG/DL (ref 65–99)
HCT VFR BLD AUTO: 43.4 % (ref 36.5–49.3)
HGB BLD-MCNC: 14.3 G/DL (ref 12–17)
MCH RBC QN AUTO: 30 PG (ref 26.8–34.3)
MCHC RBC AUTO-ENTMCNC: 32.9 G/DL (ref 31.4–37.4)
MCV RBC AUTO: 91 FL (ref 82–98)
PHOSPHATE SERPL-MCNC: 2.8 MG/DL (ref 2.3–4.1)
PLATELET # BLD AUTO: 207 THOUSANDS/UL (ref 149–390)
PMV BLD AUTO: 10.8 FL (ref 8.9–12.7)
POTASSIUM SERPL-SCNC: 4.6 MMOL/L (ref 3.5–5.3)
PROT UR-MCNC: 12 MG/DL
PROT/CREAT UR: 0.1 MG/G{CREAT} (ref 0–0.1)
RBC # BLD AUTO: 4.77 MILLION/UL (ref 3.88–5.62)
SODIUM SERPL-SCNC: 138 MMOL/L (ref 136–145)
WBC # BLD AUTO: 7.08 THOUSAND/UL (ref 4.31–10.16)

## 2019-03-14 PROCEDURE — 84156 ASSAY OF PROTEIN URINE: CPT

## 2019-03-14 PROCEDURE — 85027 COMPLETE CBC AUTOMATED: CPT

## 2019-03-14 PROCEDURE — 36415 COLL VENOUS BLD VENIPUNCTURE: CPT

## 2019-03-14 PROCEDURE — 84100 ASSAY OF PHOSPHORUS: CPT

## 2019-03-14 PROCEDURE — 80048 BASIC METABOLIC PNL TOTAL CA: CPT

## 2019-03-14 PROCEDURE — 82570 ASSAY OF URINE CREATININE: CPT

## 2019-03-19 ENCOUNTER — OFFICE VISIT (OUTPATIENT)
Dept: NEPHROLOGY | Facility: CLINIC | Age: 74
End: 2019-03-19
Payer: MEDICARE

## 2019-03-19 VITALS
BODY MASS INDEX: 21.47 KG/M2 | HEART RATE: 62 BPM | HEIGHT: 70 IN | SYSTOLIC BLOOD PRESSURE: 110 MMHG | DIASTOLIC BLOOD PRESSURE: 70 MMHG | WEIGHT: 150 LBS

## 2019-03-19 DIAGNOSIS — N20.0 NEPHROLITHIASIS: ICD-10-CM

## 2019-03-19 DIAGNOSIS — N18.30 CKD (CHRONIC KIDNEY DISEASE), STAGE III (HCC): Primary | ICD-10-CM

## 2019-03-19 PROCEDURE — 99213 OFFICE O/P EST LOW 20 MIN: CPT | Performed by: INTERNAL MEDICINE

## 2019-03-19 NOTE — PROGRESS NOTES
NEPHROLOGY OUTPATIENT PROGRESS NOTE   Jaqui Castaneda 75 y o  male MRN: [id number]  DATE: 3/19/2019  Reason for visit:   Chief Complaint   Patient presents with    Follow-up     CKD     ASSESSMENT and PLAN:  Likely CK D stage III with serum creatinine baseline 1 3 -1 4  - CK D could be secondary to previous long-term use of acyclovir/omeprazole with episode of JOSEPH causing residual damage  Could have possible chronic interstitial nephritis from the previous insults  - Now creatinine stable at 1 4 in March 2019  - We will repeat BMP, UPC ratio before next visit  - Continue to drink plenty of fluid  - Urinalysis done in January 2018 was bland without hematuria or proteinuria  UPC ratio 100 mg in March 2019, nonsignificant     - Renal ultrasound in June 2016 showed normal size kidneys, normal echogenicity, no hydronephrosis, bilateral renal cysts present  - Avoid nephrotoxicity or NSAIDs     BPH, on Flomax     Blood pressure is well controlled in the office today  He has no history of hypertension or diabetes      history of nephrolithiasis  No recent episode of stone  He denies any hematuria or flank pain  Continue to drink plenty of fluid  Continue to monitor  Diagnoses and all orders for this visit:    CKD (chronic kidney disease), stage III ([facility] [address] )  -     Basic metabolic panel; Future  -     CBC; Future  -     Phosphorus; Future  -     PTH, intact; Future  -     Urinalysis with reflex to microscopic; Future  -     Protein / creatinine ratio, urine; Future  -     Uric acid; Future  -     Magnesium; Future    Nephrolithiasis  -     PTH, intact; Future  -     Urinalysis with reflex to microscopic; Future  -     Uric acid;  Future          SUBJECTIVE / HPI:  Patient is 74-year-old male with past medical history of BPH, history of kidney stones total 2 episodes, first episode in 1989 and second episode after couple years requiring lithotripsy, no history of hypertension or diabetes, CK D stage III with baseline creatinine 1 3-1 4, previous creatinine 1 1 in January 2016, comes for regular follow-up of his renal failure  No new urinary complaints since last visit  No recent NSAID exposure  He tries to keep himself hydrated  No chest pain, no shortness of breath, denies any nausea, vomiting or diarrhea  He takes Flomax and Pepcid  No recent change in his medications  Last serum creatinine 1 4 overall stable  REVIEW OF SYSTEMS:  More than 10 point review of systems were obtained and discussed in length with the patient  Complete review of systems were negative / unremarkable except mentioned above  PHYSICAL EXAM:  Vitals:    03/19/19 0806   BP: 110/70   BP Location: Left arm   Patient Position: Sitting   Cuff Size: Standard   Pulse: 62   Weight: 68 kg (150 lb)   Height: 5' 10" (1 778 m)     Body mass index is 21 52 kg/m²  Physical Exam   Constitutional: He is oriented to person, place, and time  He appears well-developed and well-nourished  HENT:   Head: Normocephalic and atraumatic  Right Ear: External ear normal    Left Ear: External ear normal    Nose: Nose normal    Eyes: Pupils are equal, round, and reactive to light  Conjunctivae and EOM are normal  No scleral icterus  Neck: Neck supple  No JVD present  Cardiovascular: Normal rate and normal heart sounds  Pulmonary/Chest: Effort normal and breath sounds normal  No respiratory distress  He has no wheezes  He has no rales  Abdominal: Soft  Bowel sounds are normal  He exhibits no distension  There is no tenderness  Musculoskeletal: He exhibits no edema or tenderness  Neurological: He is alert and oriented to person, place, and time  Skin: Skin is warm and dry  Psychiatric: He has a normal mood and affect  His behavior is normal    Vitals reviewed        PAST MEDICAL HISTORY:  Past Medical History:   Diagnosis Date    BPH (benign prostatic hyperplasia)     GERD (gastroesophageal reflux disease)     Herpes        PAST SURGICAL HISTORY:  No past surgical history on file  SOCIAL HISTORY:  Social History     Substance and Sexual Activity   Alcohol Use Yes    Alcohol/week: 0 6 - 1 2 oz    Types: 1 - 2 Glasses of wine per week    Comment: occas     Social History     Substance and Sexual Activity   Drug Use No     Social History     Tobacco Use   Smoking Status Never Smoker   Smokeless Tobacco Never Used       FAMILY HISTORY:  No family history on file      MEDICATIONS:    Current Outpatient Medications:     famotidine (PEPCID) 40 MG tablet, Take 40 mg by mouth daily, Disp: , Rfl:     tamsulosin (FLOMAX) 0 4 mg, Take 0 4 mg by mouth daily with dinner , Disp: , Rfl:     Lab Results:   Results for orders placed or performed in visit on 57/40/78   Basic metabolic panel   Result Value Ref Range    Sodium 138 136 - 145 mmol/L    Potassium 4 6 3 5 - 5 3 mmol/L    Chloride 108 100 - 108 mmol/L    CO2 27 21 - 32 mmol/L    ANION GAP 3 (L) 4 - 13 mmol/L    BUN 20 5 - 25 mg/dL    Creatinine 1 40 (H) 0 60 - 1 30 mg/dL    Glucose, Fasting 90 65 - 99 mg/dL    Calcium 9 0 8 3 - 10 1 mg/dL    eGFR 49 ml/min/1 73sq m   CBC   Result Value Ref Range    WBC 7 08 4 31 - 10 16 Thousand/uL    RBC 4 77 3 88 - 5 62 Million/uL    Hemoglobin 14 3 12 0 - 17 0 g/dL    Hematocrit 43 4 36 5 - 49 3 %    MCV 91 82 - 98 fL    MCH 30 0 26 8 - 34 3 pg    MCHC 32 9 31 4 - 37 4 g/dL    RDW 12 6 11 6 - 15 1 %    Platelets 627 815 - 124 Thousands/uL    MPV 10 8 8 9 - 12 7 fL   Phosphorus   Result Value Ref Range    Phosphorus 2 8 2 3 - 4 1 mg/dL   Protein / creatinine ratio, urine   Result Value Ref Range    Creatinine, Ur 120 0 mg/dL    Protein Urine Random 12 mg/dL    Prot/Creat Ratio, Ur 0 10 0 00 - 0 10

## 2019-04-29 ENCOUNTER — TRANSCRIBE ORDERS (OUTPATIENT)
Dept: LAB | Facility: CLINIC | Age: 74
End: 2019-04-29

## 2019-04-29 ENCOUNTER — APPOINTMENT (OUTPATIENT)
Dept: LAB | Facility: CLINIC | Age: 74
End: 2019-04-29
Payer: MEDICARE

## 2019-04-29 DIAGNOSIS — N18.30 CHRONIC KIDNEY DISEASE, STAGE III (MODERATE) (HCC): ICD-10-CM

## 2019-04-29 DIAGNOSIS — E86.0 DEHYDRATION: ICD-10-CM

## 2019-04-29 DIAGNOSIS — D63.8 CHRONIC DISEASE ANEMIA: ICD-10-CM

## 2019-04-29 DIAGNOSIS — N18.30 CHRONIC KIDNEY DISEASE, STAGE III (MODERATE) (HCC): Primary | ICD-10-CM

## 2019-04-29 LAB
ALBUMIN SERPL BCP-MCNC: 4 G/DL (ref 3.5–5)
ALP SERPL-CCNC: 78 U/L (ref 46–116)
ALT SERPL W P-5'-P-CCNC: 26 U/L (ref 12–78)
ANION GAP SERPL CALCULATED.3IONS-SCNC: 5 MMOL/L (ref 4–13)
AST SERPL W P-5'-P-CCNC: 22 U/L (ref 5–45)
BILIRUB SERPL-MCNC: 0.6 MG/DL (ref 0.2–1)
BUN SERPL-MCNC: 26 MG/DL (ref 5–25)
CALCIUM SERPL-MCNC: 8.7 MG/DL (ref 8.3–10.1)
CHLORIDE SERPL-SCNC: 109 MMOL/L (ref 100–108)
CK SERPL-CCNC: 130 U/L (ref 39–308)
CO2 SERPL-SCNC: 26 MMOL/L (ref 21–32)
CREAT SERPL-MCNC: 1.43 MG/DL (ref 0.6–1.3)
GFR SERPL CREATININE-BSD FRML MDRD: 48 ML/MIN/1.73SQ M
GLUCOSE P FAST SERPL-MCNC: 90 MG/DL (ref 65–99)
HCT VFR BLD AUTO: 44 % (ref 36.5–49.3)
HGB BLD-MCNC: 14.3 G/DL (ref 12–17)
POTASSIUM SERPL-SCNC: 4.4 MMOL/L (ref 3.5–5.3)
PROT SERPL-MCNC: 7.4 G/DL (ref 6.4–8.2)
SODIUM SERPL-SCNC: 140 MMOL/L (ref 136–145)
URATE SERPL-MCNC: 4.9 MG/DL (ref 4.2–8)

## 2019-04-29 PROCEDURE — 80053 COMPREHEN METABOLIC PANEL: CPT

## 2019-04-29 PROCEDURE — 36415 COLL VENOUS BLD VENIPUNCTURE: CPT

## 2019-04-29 PROCEDURE — 84550 ASSAY OF BLOOD/URIC ACID: CPT

## 2019-04-29 PROCEDURE — 85014 HEMATOCRIT: CPT

## 2019-04-29 PROCEDURE — 82550 ASSAY OF CK (CPK): CPT

## 2019-04-29 PROCEDURE — 85018 HEMOGLOBIN: CPT

## 2019-05-06 ENCOUNTER — TRANSCRIBE ORDERS (OUTPATIENT)
Dept: ADMINISTRATIVE | Facility: HOSPITAL | Age: 74
End: 2019-05-06

## 2019-05-06 DIAGNOSIS — I63.9 CEREBROVASCULAR ACCIDENT (CVA), UNSPECIFIED MECHANISM (HCC): Primary | ICD-10-CM

## 2019-05-09 ENCOUNTER — TELEPHONE (OUTPATIENT)
Dept: OTHER | Facility: HOSPITAL | Age: 74
End: 2019-05-09

## 2019-05-24 ENCOUNTER — HOSPITAL ENCOUNTER (OUTPATIENT)
Dept: RADIOLOGY | Facility: HOSPITAL | Age: 74
Discharge: HOME | End: 2019-05-24
Attending: INTERNAL MEDICINE
Payer: MEDICARE

## 2019-05-24 ENCOUNTER — HOSPITAL ENCOUNTER (OUTPATIENT)
Dept: NON INVASIVE DIAGNOSTICS | Facility: HOSPITAL | Age: 74
Discharge: HOME | End: 2019-05-24
Attending: INTERNAL MEDICINE
Payer: MEDICARE

## 2019-05-24 DIAGNOSIS — I63.9 CEREBROVASCULAR ACCIDENT (CVA), UNSPECIFIED MECHANISM (HCC): ICD-10-CM

## 2019-05-24 PROCEDURE — 93880 EXTRACRANIAL BILAT STUDY: CPT | Performed by: SURGERY

## 2019-05-24 PROCEDURE — 93880 EXTRACRANIAL BILAT STUDY: CPT

## 2019-05-24 PROCEDURE — 93306 TTE W/DOPPLER COMPLETE: CPT

## 2019-05-26 PROCEDURE — 93306 TTE W/DOPPLER COMPLETE: CPT | Performed by: INTERNAL MEDICINE

## 2019-07-15 ENCOUNTER — TELEPHONE (OUTPATIENT)
Dept: NEPHROLOGY | Facility: CLINIC | Age: 74
End: 2019-07-15

## 2019-07-15 NOTE — TELEPHONE ENCOUNTER
I left a message for patient to schedule August follow up with Dr Juan J Hay in the [place] office

## 2019-08-07 ENCOUNTER — TELEPHONE (OUTPATIENT)
Dept: OTHER | Facility: HOSPITAL | Age: 74
End: 2019-08-07

## 2019-08-07 ENCOUNTER — APPOINTMENT (OUTPATIENT)
Dept: LAB | Facility: CLINIC | Age: 74
End: 2019-08-07
Payer: MEDICARE

## 2019-08-07 DIAGNOSIS — N18.30 CKD (CHRONIC KIDNEY DISEASE), STAGE III (HCC): ICD-10-CM

## 2019-08-07 DIAGNOSIS — N20.0 NEPHROLITHIASIS: ICD-10-CM

## 2019-08-07 LAB
ANION GAP SERPL CALCULATED.3IONS-SCNC: 6 MMOL/L (ref 4–13)
BACTERIA UR QL AUTO: ABNORMAL /HPF
BILIRUB UR QL STRIP: NEGATIVE
BUN SERPL-MCNC: 22 MG/DL (ref 5–25)
CALCIUM SERPL-MCNC: 8.8 MG/DL (ref 8.3–10.1)
CHLORIDE SERPL-SCNC: 110 MMOL/L (ref 100–108)
CLARITY UR: CLEAR
CO2 SERPL-SCNC: 27 MMOL/L (ref 21–32)
COLOR UR: YELLOW
CREAT SERPL-MCNC: 1.23 MG/DL (ref 0.6–1.3)
CREAT UR-MCNC: 207 MG/DL
ERYTHROCYTE [DISTWIDTH] IN BLOOD BY AUTOMATED COUNT: 12.4 % (ref 11.6–15.1)
GFR SERPL CREATININE-BSD FRML MDRD: 57 ML/MIN/1.73SQ M
GLUCOSE P FAST SERPL-MCNC: 89 MG/DL (ref 65–99)
GLUCOSE UR STRIP-MCNC: NEGATIVE MG/DL
HCT VFR BLD AUTO: 40.5 % (ref 36.5–49.3)
HGB BLD-MCNC: 13.4 G/DL (ref 12–17)
HGB UR QL STRIP.AUTO: ABNORMAL
HYALINE CASTS #/AREA URNS LPF: ABNORMAL /LPF
KETONES UR STRIP-MCNC: NEGATIVE MG/DL
LEUKOCYTE ESTERASE UR QL STRIP: NEGATIVE
MAGNESIUM SERPL-MCNC: 2.5 MG/DL (ref 1.6–2.6)
MCH RBC QN AUTO: 30 PG (ref 26.8–34.3)
MCHC RBC AUTO-ENTMCNC: 33.1 G/DL (ref 31.4–37.4)
MCV RBC AUTO: 91 FL (ref 82–98)
NITRITE UR QL STRIP: NEGATIVE
NON-SQ EPI CELLS URNS QL MICRO: ABNORMAL /HPF
PH UR STRIP.AUTO: 6 [PH]
PHOSPHATE SERPL-MCNC: 2.4 MG/DL (ref 2.3–4.1)
PLATELET # BLD AUTO: 191 THOUSANDS/UL (ref 149–390)
PMV BLD AUTO: 10.3 FL (ref 8.9–12.7)
POTASSIUM SERPL-SCNC: 4.4 MMOL/L (ref 3.5–5.3)
PROT UR STRIP-MCNC: NEGATIVE MG/DL
PROT UR-MCNC: 18 MG/DL
PROT/CREAT UR: 0.09 MG/G{CREAT} (ref 0–0.1)
PTH-INTACT SERPL-MCNC: 49.4 PG/ML (ref 18.4–80.1)
RBC # BLD AUTO: 4.47 MILLION/UL (ref 3.88–5.62)
RBC #/AREA URNS AUTO: ABNORMAL /HPF
SODIUM SERPL-SCNC: 143 MMOL/L (ref 136–145)
SP GR UR STRIP.AUTO: 1.02 (ref 1–1.03)
URATE SERPL-MCNC: 5.8 MG/DL (ref 4.2–8)
UROBILINOGEN UR QL STRIP.AUTO: 0.2 E.U./DL
WBC # BLD AUTO: 7.29 THOUSAND/UL (ref 4.31–10.16)
WBC #/AREA URNS AUTO: ABNORMAL /HPF

## 2019-08-07 PROCEDURE — 84550 ASSAY OF BLOOD/URIC ACID: CPT

## 2019-08-07 PROCEDURE — 83735 ASSAY OF MAGNESIUM: CPT

## 2019-08-07 PROCEDURE — 84156 ASSAY OF PROTEIN URINE: CPT

## 2019-08-07 PROCEDURE — 36415 COLL VENOUS BLD VENIPUNCTURE: CPT

## 2019-08-07 PROCEDURE — 83970 ASSAY OF PARATHORMONE: CPT

## 2019-08-07 PROCEDURE — 82570 ASSAY OF URINE CREATININE: CPT

## 2019-08-07 PROCEDURE — 81001 URINALYSIS AUTO W/SCOPE: CPT

## 2019-08-07 PROCEDURE — 85027 COMPLETE CBC AUTOMATED: CPT

## 2019-08-07 PROCEDURE — 80048 BASIC METABOLIC PNL TOTAL CA: CPT

## 2019-08-07 PROCEDURE — 84100 ASSAY OF PHOSPHORUS: CPT

## 2019-08-07 NOTE — TELEPHONE ENCOUNTER
Spoke with the patient and he is aware of his lab test results and to continue to drink plenty of fluids          Dorothea Marte MA

## 2019-08-07 NOTE — TELEPHONE ENCOUNTER
Can you please let him know that serum creatinine stable at 1 2  He should continue to drink plenty of free water to stay hydrated    Will discuss further during office visit

## 2019-08-27 ENCOUNTER — TRANSCRIBE ORDERS (OUTPATIENT)
Dept: LAB | Facility: CLINIC | Age: 74
End: 2019-08-27

## 2019-08-27 ENCOUNTER — LAB (OUTPATIENT)
Dept: LAB | Facility: CLINIC | Age: 74
End: 2019-08-27
Payer: MEDICARE

## 2019-08-27 DIAGNOSIS — D40.0 NEOPLASM OF UNCERTAIN BEHAVIOR OF PROSTATE: ICD-10-CM

## 2019-08-27 DIAGNOSIS — I10 ESSENTIAL HYPERTENSION, MALIGNANT: ICD-10-CM

## 2019-08-27 DIAGNOSIS — D50.0 IRON DEFICIENCY ANEMIA SECONDARY TO BLOOD LOSS (CHRONIC): ICD-10-CM

## 2019-08-27 DIAGNOSIS — N18.30 CHRONIC KIDNEY DISEASE, STAGE III (MODERATE) (HCC): ICD-10-CM

## 2019-08-27 DIAGNOSIS — D63.8 CHRONIC DISEASE ANEMIA: ICD-10-CM

## 2019-08-27 DIAGNOSIS — D40.0 NEOPLASM OF UNCERTAIN BEHAVIOR OF PROSTATE: Primary | ICD-10-CM

## 2019-08-27 LAB
ALBUMIN SERPL BCP-MCNC: 4.3 G/DL (ref 3.5–5)
ALP SERPL-CCNC: 74 U/L (ref 46–116)
ALT SERPL W P-5'-P-CCNC: 25 U/L (ref 12–78)
ANION GAP SERPL CALCULATED.3IONS-SCNC: 7 MMOL/L (ref 4–13)
AST SERPL W P-5'-P-CCNC: 29 U/L (ref 5–45)
BILIRUB SERPL-MCNC: 0.76 MG/DL (ref 0.2–1)
BUN SERPL-MCNC: 19 MG/DL (ref 5–25)
CALCIUM SERPL-MCNC: 9.2 MG/DL (ref 8.3–10.1)
CHLORIDE SERPL-SCNC: 111 MMOL/L (ref 100–108)
CO2 SERPL-SCNC: 25 MMOL/L (ref 21–32)
CREAT SERPL-MCNC: 1.27 MG/DL (ref 0.6–1.3)
GFR SERPL CREATININE-BSD FRML MDRD: 55 ML/MIN/1.73SQ M
GLUCOSE P FAST SERPL-MCNC: 90 MG/DL (ref 65–99)
HGB BLD-MCNC: 13.6 G/DL (ref 12–17)
POTASSIUM SERPL-SCNC: 4.6 MMOL/L (ref 3.5–5.3)
PROT SERPL-MCNC: 7.4 G/DL (ref 6.4–8.2)
PSA SERPL-MCNC: 1.3 NG/ML (ref 0–4)
SODIUM SERPL-SCNC: 143 MMOL/L (ref 136–145)

## 2019-08-27 PROCEDURE — 80053 COMPREHEN METABOLIC PANEL: CPT

## 2019-08-27 PROCEDURE — G0103 PSA SCREENING: HCPCS

## 2019-08-27 PROCEDURE — 85018 HEMOGLOBIN: CPT

## 2019-08-27 PROCEDURE — 36415 COLL VENOUS BLD VENIPUNCTURE: CPT

## 2019-09-02 PROBLEM — E87.5 HYPERKALEMIA: Status: ACTIVE | Noted: 2017-09-27

## 2019-09-02 PROBLEM — N40.0 BPH (BENIGN PROSTATIC HYPERPLASIA): Status: ACTIVE | Noted: 2019-09-02

## 2019-09-05 PROBLEM — K22.70 BARRETT ESOPHAGUS: Status: ACTIVE | Noted: 2019-09-05

## 2019-09-19 ENCOUNTER — OFFICE VISIT (OUTPATIENT)
Dept: NEPHROLOGY | Facility: CLINIC | Age: 74
End: 2019-09-19
Payer: MEDICARE

## 2019-09-19 VITALS
BODY MASS INDEX: 21.47 KG/M2 | WEIGHT: 150 LBS | HEART RATE: 68 BPM | DIASTOLIC BLOOD PRESSURE: 72 MMHG | HEIGHT: 70 IN | SYSTOLIC BLOOD PRESSURE: 116 MMHG

## 2019-09-19 DIAGNOSIS — N28.1 RENAL CYST: ICD-10-CM

## 2019-09-19 DIAGNOSIS — E55.9 VITAMIN D INSUFFICIENCY: ICD-10-CM

## 2019-09-19 DIAGNOSIS — N18.30 CKD (CHRONIC KIDNEY DISEASE), STAGE III (HCC): Primary | ICD-10-CM

## 2019-09-19 DIAGNOSIS — N20.0 NEPHROLITHIASIS: ICD-10-CM

## 2019-09-19 PROBLEM — E87.5 HYPERKALEMIA: Status: RESOLVED | Noted: 2017-09-27 | Resolved: 2019-09-19

## 2019-09-19 PROCEDURE — 99214 OFFICE O/P EST MOD 30 MIN: CPT | Performed by: INTERNAL MEDICINE

## 2019-09-19 NOTE — PROGRESS NOTES
NEPHROLOGY OUTPATIENT PROGRESS NOTE   Kp Tineo 75 y o  male MRN: [id number]  DATE: 9/19/2019  Reason for visit:   Chief Complaint   Patient presents with    Follow-up    Chronic Kidney Disease     ASSESSMENT and PLAN:  Likely CK D stage III with serum creatinine baseline 1 3 -1 4  - CKD could be secondary to previous long-term use of acyclovir/omeprazole with episode of JOSEPH causing residual damage  Could have possible chronic interstitial nephritis from the previous insults   -last creatinine 1 2 in August 2019 overall stable at baseline   - We will repeat BMP, UPC ratio before next visit  - Continue to drink plenty of fluid  - multiple urinalysis has been bland without significant hematuria or proteinuria  Last UA in August 2019 shows 2 to 4 RBCs, no proteinuria  - Renal ultrasound in June 2016 showed normal size kidneys, normal echogenicity, no hydronephrosis, bilateral renal cysts present  - Avoid nephrotoxicity or NSAIDs    Bilateral renal cyst  -will do renal ultrasound as follow-up prior to next office visit  Vitamin-D insufficiency, last vitamin-D 25 hydroxy level 24 7 in 2018  Repeat level before next visit  Currently remains on vitamin-D 1000 units p o  Daily      BPH, on Flomax     Blood pressure is well controlled in the office today  He has no history of hypertension or diabetes      history of nephrolithiasis  No recent episode of stone  He denies any hematuria or flank pain  Continue to drink plenty of fluid  Continue to monitor  Diagnoses and all orders for this visit:    CKD (chronic kidney disease), stage III ([address] )  -     Basic metabolic panel; Future  -     Microalbumin / creatinine urine ratio; Future  -     Phosphorus; Future  -     PTH, intact; Future  -     Magnesium; Future    Nephrolithiasis    Renal cyst  -     US retroperitoneal complete; Future    Vitamin D insufficiency  -     Vitamin D 25 hydroxy;  Future        SUBJECTIVE / HPI:  Patient is 78-year-old male with past medical history of BPH, history of kidney stones total 2 episodes, first episode in 1989 and second episode after couple years requiring lithotripsy, no history of hypertension or diabetes, CK D stage III with baseline creatinine 1 3-1 4, previous creatinine 1 1 in January 2016, comes for regular follow-up of his renal failure  No new urinary complaints since last visit  No recent NSAID exposure  He tries to keep himself hydrated  No chest pain, no shortness of breath, denies any nausea, vomiting or diarrhea  He takes Flomax and Pepcid  No recent change in his medications      Last serum creatinine 1 2 overall stable  REVIEW OF SYSTEMS:  More than 10 point review of systems were obtained and discussed in length with the patient  Complete review of systems were negative / unremarkable except mentioned above  PHYSICAL EXAM:  Vitals:    09/19/19 0828   BP: 116/72   BP Location: Left arm   Patient Position: Sitting   Cuff Size: Adult   Pulse: 68   Weight: 68 kg (150 lb)   Height: 5' 10" (1 778 m)     Body mass index is 21 52 kg/m²  Physical Exam   Constitutional: He is oriented to person, place, and time  He appears well-developed and well-nourished  HENT:   Head: Normocephalic and atraumatic  Right Ear: External ear normal    Left Ear: External ear normal    Nose: Nose normal    Eyes: Pupils are equal, round, and reactive to light  Conjunctivae and EOM are normal  No scleral icterus  Neck: Neck supple  No JVD present  Cardiovascular: Normal rate and normal heart sounds  Pulmonary/Chest: Effort normal and breath sounds normal  No respiratory distress  He has no wheezes  He has no rales  Abdominal: Soft  Bowel sounds are normal  He exhibits no distension  There is no tenderness  Musculoskeletal: He exhibits no edema or tenderness  Neurological: He is alert and oriented to person, place, and time  Skin: Skin is warm and dry  Psychiatric: He has a normal mood and affect   His behavior is normal    Vitals reviewed  PAST MEDICAL HISTORY:  Past Medical History:   Diagnosis Date    BPH (benign prostatic hyperplasia)     GERD (gastroesophageal reflux disease)     Herpes        PAST SURGICAL HISTORY:  History reviewed  No pertinent surgical history      SOCIAL HISTORY:  Social History     Substance and Sexual Activity   Alcohol Use Yes    Alcohol/week: 1 0 - 2 0 standard drinks    Types: 1 - 2 Glasses of wine per week    Comment: occas     Social History     Substance and Sexual Activity   Drug Use No     Social History     Tobacco Use   Smoking Status Never Smoker   Smokeless Tobacco Never Used       FAMILY HISTORY:  Family History   Problem Relation Age of Onset    No Known Problems Mother     No Known Problems Father        MEDICATIONS:    Current Outpatient Medications:     aspirin (ECOTRIN LOW STRENGTH) 81 mg EC tablet, Take 81 mg by mouth daily, Disp: , Rfl:     cholecalciferol (VITAMIN D3) 1,000 units tablet, Take 1,000 Units by mouth daily, Disp: , Rfl:     famotidine (PEPCID) 40 MG tablet, Take 1 tablet (40 mg total) by mouth daily, Disp: 90 tablet, Rfl: 1    tamsulosin (FLOMAX) 0 4 mg, Take 0 4 mg by mouth daily with dinner , Disp: , Rfl:     vitamin B-12 (VITAMIN B-12) 1,000 mcg tablet, Take 1,000 mcg by mouth daily, Disp: , Rfl:     Lab Results:   Results for orders placed or performed in visit on 08/27/19   Comprehensive metabolic panel   Result Value Ref Range    Sodium 143 136 - 145 mmol/L    Potassium 4 6 3 5 - 5 3 mmol/L    Chloride 111 (H) 100 - 108 mmol/L    CO2 25 21 - 32 mmol/L    ANION GAP 7 4 - 13 mmol/L    BUN 19 5 - 25 mg/dL    Creatinine 1 27 0 60 - 1 30 mg/dL    Glucose, Fasting 90 65 - 99 mg/dL    Calcium 9 2 8 3 - 10 1 mg/dL    AST 29 5 - 45 U/L    ALT 25 12 - 78 U/L    Alkaline Phosphatase 74 46 - 116 U/L    Total Protein 7 4 6 4 - 8 2 g/dL    Albumin 4 3 3 5 - 5 0 g/dL    Total Bilirubin 0 76 0 20 - 1 00 mg/dL    eGFR 55 ml/min/1 73sq m   Hemoglobin   Result Value Ref Range    Hemoglobin 13 6 12 0 - 17 0 g/dL   PSA, Total Screen   Result Value Ref Range    PSA 1 3 0 0 - 4 0 ng/mL

## 2020-02-26 ENCOUNTER — APPOINTMENT (OUTPATIENT)
Dept: LAB | Facility: CLINIC | Age: 75
End: 2020-02-26
Payer: MEDICARE

## 2020-02-26 DIAGNOSIS — E78.00 HYPERCHOLESTEREMIA: ICD-10-CM

## 2020-02-26 DIAGNOSIS — D63.1 ANEMIA DUE TO STAGE 3 CHRONIC KIDNEY DISEASE (HCC): ICD-10-CM

## 2020-02-26 DIAGNOSIS — N18.30 ANEMIA DUE TO STAGE 3 CHRONIC KIDNEY DISEASE (HCC): ICD-10-CM

## 2020-02-26 DIAGNOSIS — E55.9 VITAMIN D DEFICIENCY: ICD-10-CM

## 2020-02-26 LAB
25(OH)D3 SERPL-MCNC: 51.3 NG/ML (ref 30–100)
ALBUMIN SERPL BCP-MCNC: 4 G/DL (ref 3.5–5)
ALP SERPL-CCNC: 76 U/L (ref 46–116)
ALT SERPL W P-5'-P-CCNC: 22 U/L (ref 12–78)
ANION GAP SERPL CALCULATED.3IONS-SCNC: 3 MMOL/L (ref 4–13)
AST SERPL W P-5'-P-CCNC: 22 U/L (ref 5–45)
BASOPHILS # BLD AUTO: 0.05 THOUSANDS/ΜL (ref 0–0.1)
BASOPHILS NFR BLD AUTO: 1 % (ref 0–1)
BILIRUB SERPL-MCNC: 0.69 MG/DL (ref 0.2–1)
BUN SERPL-MCNC: 22 MG/DL (ref 5–25)
CALCIUM SERPL-MCNC: 8.8 MG/DL (ref 8.3–10.1)
CHLORIDE SERPL-SCNC: 108 MMOL/L (ref 100–108)
CHOLEST SERPL-MCNC: 193 MG/DL (ref 50–200)
CO2 SERPL-SCNC: 27 MMOL/L (ref 21–32)
CREAT SERPL-MCNC: 1.36 MG/DL (ref 0.6–1.3)
EOSINOPHIL # BLD AUTO: 0.23 THOUSAND/ΜL (ref 0–0.61)
EOSINOPHIL NFR BLD AUTO: 4 % (ref 0–6)
ERYTHROCYTE [DISTWIDTH] IN BLOOD BY AUTOMATED COUNT: 12.7 % (ref 11.6–15.1)
GFR SERPL CREATININE-BSD FRML MDRD: 51 ML/MIN/1.73SQ M
GLUCOSE P FAST SERPL-MCNC: 90 MG/DL (ref 65–99)
HCT VFR BLD AUTO: 42.9 % (ref 36.5–49.3)
HDLC SERPL-MCNC: 55 MG/DL
HGB BLD-MCNC: 13.8 G/DL (ref 12–17)
IMM GRANULOCYTES # BLD AUTO: 0.01 THOUSAND/UL (ref 0–0.2)
IMM GRANULOCYTES NFR BLD AUTO: 0 % (ref 0–2)
LDLC SERPL CALC-MCNC: 120 MG/DL (ref 0–100)
LYMPHOCYTES # BLD AUTO: 1.74 THOUSANDS/ΜL (ref 0.6–4.47)
LYMPHOCYTES NFR BLD AUTO: 28 % (ref 14–44)
MCH RBC QN AUTO: 29.3 PG (ref 26.8–34.3)
MCHC RBC AUTO-ENTMCNC: 32.2 G/DL (ref 31.4–37.4)
MCV RBC AUTO: 91 FL (ref 82–98)
MONOCYTES # BLD AUTO: 0.72 THOUSAND/ΜL (ref 0.17–1.22)
MONOCYTES NFR BLD AUTO: 12 % (ref 4–12)
NEUTROPHILS # BLD AUTO: 3.51 THOUSANDS/ΜL (ref 1.85–7.62)
NEUTS SEG NFR BLD AUTO: 55 % (ref 43–75)
NONHDLC SERPL-MCNC: 138 MG/DL
NRBC BLD AUTO-RTO: 0 /100 WBCS
PLATELET # BLD AUTO: 195 THOUSANDS/UL (ref 149–390)
PMV BLD AUTO: 10.5 FL (ref 8.9–12.7)
POTASSIUM SERPL-SCNC: 4.6 MMOL/L (ref 3.5–5.3)
PROT SERPL-MCNC: 7.4 G/DL (ref 6.4–8.2)
RBC # BLD AUTO: 4.71 MILLION/UL (ref 3.88–5.62)
SODIUM SERPL-SCNC: 138 MMOL/L (ref 136–145)
TRIGL SERPL-MCNC: 88 MG/DL
WBC # BLD AUTO: 6.26 THOUSAND/UL (ref 4.31–10.16)

## 2020-02-26 PROCEDURE — 85025 COMPLETE CBC W/AUTO DIFF WBC: CPT

## 2020-02-26 PROCEDURE — 80061 LIPID PANEL: CPT

## 2020-02-26 PROCEDURE — 36415 COLL VENOUS BLD VENIPUNCTURE: CPT

## 2020-02-26 PROCEDURE — 80053 COMPREHEN METABOLIC PANEL: CPT

## 2020-02-26 PROCEDURE — 82306 VITAMIN D 25 HYDROXY: CPT

## 2020-03-06 ENCOUNTER — APPOINTMENT (OUTPATIENT)
Dept: LAB | Facility: CLINIC | Age: 75
End: 2020-03-06
Payer: MEDICARE

## 2020-03-06 ENCOUNTER — TRANSCRIBE ORDERS (OUTPATIENT)
Dept: LAB | Facility: CLINIC | Age: 75
End: 2020-03-06

## 2020-03-06 DIAGNOSIS — D40.0 NEOPLASM OF UNCERTAIN BEHAVIOR OF PROSTATE: ICD-10-CM

## 2020-03-06 DIAGNOSIS — D40.0 NEOPLASM OF UNCERTAIN BEHAVIOR OF PROSTATE: Primary | ICD-10-CM

## 2020-03-06 PROBLEM — E78.00 HYPERCHOLESTEREMIA: Status: ACTIVE | Noted: 2020-03-06

## 2020-03-06 LAB — PSA SERPL-MCNC: 1.2 NG/ML (ref 0–4)

## 2020-03-06 PROCEDURE — 84153 ASSAY OF PSA TOTAL: CPT

## 2020-08-07 ENCOUNTER — TELEPHONE (OUTPATIENT)
Dept: NEPHROLOGY | Facility: CLINIC | Age: 75
End: 2020-08-07

## 2020-08-07 NOTE — TELEPHONE ENCOUNTER
LM for patient to reschedule 9/29/20 appointment with Dr Cynthia Jurado in our [facility] due to provider out of the office that day

## 2020-08-21 PROBLEM — I49.9 IRREGULAR HEART BEAT: Status: ACTIVE | Noted: 2020-08-21

## 2020-08-21 PROBLEM — R03.0 ELEVATED BLOOD PRESSURE READING: Status: ACTIVE | Noted: 2020-08-21

## 2020-08-22 ENCOUNTER — APPOINTMENT (OUTPATIENT)
Dept: LAB | Facility: HOSPITAL | Age: 75
End: 2020-08-22
Attending: INTERNAL MEDICINE
Payer: MEDICARE

## 2020-08-22 ENCOUNTER — TRANSCRIBE ORDERS (OUTPATIENT)
Dept: ADMINISTRATIVE | Facility: HOSPITAL | Age: 75
End: 2020-08-22

## 2020-08-22 DIAGNOSIS — N18.30 ANEMIA DUE TO STAGE 3 CHRONIC KIDNEY DISEASE (HCC): ICD-10-CM

## 2020-08-22 DIAGNOSIS — I49.9 IRREGULAR HEART RATE: ICD-10-CM

## 2020-08-22 DIAGNOSIS — N18.30 CKD (CHRONIC KIDNEY DISEASE), STAGE III (HCC): ICD-10-CM

## 2020-08-22 DIAGNOSIS — E78.00 HYPERCHOLESTEREMIA: ICD-10-CM

## 2020-08-22 DIAGNOSIS — D63.1 ANEMIA DUE TO STAGE 3 CHRONIC KIDNEY DISEASE (HCC): ICD-10-CM

## 2020-08-22 DIAGNOSIS — R03.0 ELEVATED BLOOD PRESSURE READING: ICD-10-CM

## 2020-08-22 DIAGNOSIS — I49.9 IRREGULAR HEART BEAT: ICD-10-CM

## 2020-08-22 DIAGNOSIS — E55.9 VITAMIN D INSUFFICIENCY: ICD-10-CM

## 2020-08-22 LAB
25(OH)D3 SERPL-MCNC: 47.6 NG/ML (ref 30–100)
ALBUMIN SERPL BCP-MCNC: 3.7 G/DL (ref 3.5–5)
ALP SERPL-CCNC: 78 U/L (ref 46–116)
ALT SERPL W P-5'-P-CCNC: 21 U/L (ref 12–78)
ANION GAP SERPL CALCULATED.3IONS-SCNC: 7 MMOL/L (ref 4–13)
AST SERPL W P-5'-P-CCNC: 22 U/L (ref 5–45)
BILIRUB SERPL-MCNC: 0.7 MG/DL (ref 0.2–1)
BUN SERPL-MCNC: 22 MG/DL (ref 5–25)
CALCIUM SERPL-MCNC: 8.4 MG/DL (ref 8.3–10.1)
CHLORIDE SERPL-SCNC: 103 MMOL/L (ref 100–108)
CHOLEST SERPL-MCNC: 177 MG/DL (ref 50–200)
CO2 SERPL-SCNC: 27 MMOL/L (ref 21–32)
CREAT SERPL-MCNC: 1.31 MG/DL (ref 0.6–1.3)
CREAT UR-MCNC: 119 MG/DL
ERYTHROCYTE [DISTWIDTH] IN BLOOD BY AUTOMATED COUNT: 12.6 % (ref 11.6–15.1)
GFR SERPL CREATININE-BSD FRML MDRD: 53 ML/MIN/1.73SQ M
GLUCOSE P FAST SERPL-MCNC: 93 MG/DL (ref 65–99)
HCT VFR BLD AUTO: 41.9 % (ref 36.5–49.3)
HDLC SERPL-MCNC: 45 MG/DL
HGB BLD-MCNC: 13.9 G/DL (ref 12–17)
LDLC SERPL CALC-MCNC: 114 MG/DL (ref 0–100)
MAGNESIUM SERPL-MCNC: 2.1 MG/DL (ref 1.6–2.6)
MCH RBC QN AUTO: 30.4 PG (ref 26.8–34.3)
MCHC RBC AUTO-ENTMCNC: 33.2 G/DL (ref 31.4–37.4)
MCV RBC AUTO: 92 FL (ref 82–98)
MICROALBUMIN UR-MCNC: <5 MG/L (ref 0–20)
MICROALBUMIN/CREAT 24H UR: <4 MG/G CREATININE (ref 0–30)
NONHDLC SERPL-MCNC: 132 MG/DL
PHOSPHATE SERPL-MCNC: 2.8 MG/DL (ref 2.3–4.1)
PLATELET # BLD AUTO: 195 THOUSANDS/UL (ref 149–390)
PMV BLD AUTO: 10 FL (ref 8.9–12.7)
POTASSIUM SERPL-SCNC: 4.4 MMOL/L (ref 3.5–5.3)
PROT SERPL-MCNC: 7.1 G/DL (ref 6.4–8.2)
PTH-INTACT SERPL-MCNC: 47.1 PG/ML (ref 18.4–80.1)
RBC # BLD AUTO: 4.57 MILLION/UL (ref 3.88–5.62)
SODIUM SERPL-SCNC: 137 MMOL/L (ref 136–145)
T4 FREE SERPL-MCNC: 1.02 NG/DL (ref 0.76–1.46)
TRIGL SERPL-MCNC: 91 MG/DL
TSH SERPL DL<=0.05 MIU/L-ACNC: 2.71 UIU/ML (ref 0.36–3.74)
WBC # BLD AUTO: 7.25 THOUSAND/UL (ref 4.31–10.16)

## 2020-08-22 PROCEDURE — 83735 ASSAY OF MAGNESIUM: CPT

## 2020-08-22 PROCEDURE — 84443 ASSAY THYROID STIM HORMONE: CPT

## 2020-08-22 PROCEDURE — 80061 LIPID PANEL: CPT

## 2020-08-22 PROCEDURE — 80053 COMPREHEN METABOLIC PANEL: CPT

## 2020-08-22 PROCEDURE — 82570 ASSAY OF URINE CREATININE: CPT

## 2020-08-22 PROCEDURE — 85027 COMPLETE CBC AUTOMATED: CPT

## 2020-08-22 PROCEDURE — 36415 COLL VENOUS BLD VENIPUNCTURE: CPT

## 2020-08-22 PROCEDURE — 84439 ASSAY OF FREE THYROXINE: CPT

## 2020-08-22 PROCEDURE — 82306 VITAMIN D 25 HYDROXY: CPT

## 2020-08-22 PROCEDURE — 84100 ASSAY OF PHOSPHORUS: CPT

## 2020-08-22 PROCEDURE — 83970 ASSAY OF PARATHORMONE: CPT

## 2020-08-22 PROCEDURE — 82043 UR ALBUMIN QUANTITATIVE: CPT

## 2020-08-26 NOTE — PROGRESS NOTES
Consultation - Cardiology Office  [facility]  Metta Primrose Reglorenzo 75 y o  male MRN: [id number]  : 1945  Unit/Bed#:  Encounter: [id number]      Assessment:     1  Elevated blood pressure reading    2  CKD (chronic kidney disease), stage III ([facility] [address] )    3  Anemia due to stage 3 chronic kidney disease ([facility] )    4  Irregular heart beat    5  Dyslipidemia    6  Abnormal EKG        Discussion summary and Plan:    1  Elevated blood pressure reading with episodes of irregular heartbeat on the monitor  Patient may PACs or PVCs but need to rule out other arrhythmia he occasionally feels palpitations  Will check Holter monitor  Will also order echo Doppler for ruling out structural heart disease  Exercise stress test has been ordered  2  Elevated blood pressure reading  Current blood pressure is acceptable he is taking metoprolol half the tablet  3  Dyslipidemia  Due to his age his risk for cardiovascular event is around 9-10%  Discussed with patient at length about pathophysiology of coronary artery disease  Pleiotropic effects of statin  After extensive discussion patient was started on Pravachol 10 mg every other day  Will check fasting lipid LFTs in 4-6 weeks    4  Cardiac murmur  Echo Doppler from 2019 reviewed mild valvular disease    5  Abnormal EKG with ST flattening in inferior leads exercise stress test has been ordered    6  History of anemia with CKD stage 2/3  Management by good office of primary care doctor  All those issues discussed with patient at length he agrees with the plan further plan as also of these tests become available  Thank you for your consultation  If you have any question please call me at [phone]    Counseling :  A description of the counseling  Goals and Barriers  Patient's ability to self care: Yes  Medication side effect reviewed with patient in detail and all their questions answered to their satisfaction        Primary Care Physician Requesting Consult: Yuri Hernandez MD    Reason for Consult / Principal Problem:  Regular heartbeat and high blood pressure    HPI :     Yoli Hinson is a 75y o  year old male who was referred by primary care doctor for irregular heart beat and racing of high blood pressure  Patient who monitors his blood pressure and heart rate regularly noted that his blood pressure which generally runs around 100-110 was higher and his monitor was also giving irregular heartbeat  He has history of irregular heartbeat and he felt occasionally palpitations and fast heartbeat  He was started on low-dose metoprolol which have significantly decrease his palpitations as well as irregular heartbeat on his monitor and his blood pressure has also improved  He was sent to us for further cardiac workup  He denies any new symptoms no chest pain no shortness of breath no dizziness no lightheadedness no other issues  He does have history of cardiac murmur and has an echo done and found to have mild valvular disease  There is a family history of cardiac problem as his father  suddenly at age of 80  He does not smoke he is a social drinker is very active he does lot of hiking  He has no recent surgery  He lives alone and he has 2 kids who helped him  His cholesterol profile reviewed  His risk for cardiovascular event is around 20% mostly driven by his age  He does have history of anxiety particularly the current situation of the currently bothersome lot  He has blood test done which was reviewed  Review of Systems   Constitutional: Negative for activity change, chills, diaphoresis, fever and unexpected weight change  HENT: Negative for congestion  Eyes: Negative for discharge and redness  Respiratory: Negative for cough, chest tightness, shortness of breath and wheezing  Cardiovascular: Positive for palpitations  Negative for chest pain and leg swelling     Gastrointestinal: Negative for abdominal pain, diarrhea and nausea  Endocrine: Negative  Genitourinary: Negative for decreased urine volume and urgency  Musculoskeletal: Negative  Negative for arthralgias, back pain and gait problem  Skin: Negative for rash and wound  Allergic/Immunologic: Negative  Neurological: Negative for dizziness, seizures, syncope, weakness, light-headedness and headaches  Hematological: Negative  Psychiatric/Behavioral: Negative for agitation and confusion  The patient is nervous/anxious  Historical Information   Past Medical History:   Diagnosis Date    BPH (benign prostatic hyperplasia)     GERD (gastroesophageal reflux disease)     Herpes      History reviewed  No pertinent surgical history    Social History     Substance and Sexual Activity   Alcohol Use Yes    Alcohol/week: 1 0 - 2 0 standard drinks    Types: 1 - 2 Glasses of wine per week    Comment: occas     Social History     Substance and Sexual Activity   Drug Use No     Social History     Tobacco Use   Smoking Status Never Smoker   Smokeless Tobacco Never Used     Family History:   Family History   Problem Relation Age of Onset    No Known Problems Mother     No Known Problems Father        Meds/Allergies     No Known Allergies    Current Outpatient Medications:     aspirin (ECOTRIN LOW STRENGTH) 81 mg EC tablet, Take 81 mg by mouth daily, Disp: , Rfl:     cholecalciferol (VITAMIN D3) 1,000 units tablet, Take 1,000 Units by mouth daily, Disp: , Rfl:     famotidine (PEPCID) 40 MG tablet, Take 1 tablet (40 mg total) by mouth daily, Disp: 90 tablet, Rfl: 1    metoprolol succinate (TOPROL-XL) 25 mg 24 hr tablet, Take 1 tablet (25 mg total) by mouth daily, Disp: 30 tablet, Rfl: 1    tadalafil (CIALIS) 20 MG tablet, Take 1 tablet (20 mg total) by mouth daily as needed for erectile dysfunction, Disp: 10 tablet, Rfl: 2    tamsulosin (FLOMAX) 0 4 mg, Take 0 4 mg by mouth daily with dinner , Disp: , Rfl:     vitamin B-12 (VITAMIN B-12) 1,000 mcg tablet, Take 1,000 mcg by mouth daily, Disp: , Rfl:     ketorolac (ACULAR) 0 5 % ophthalmic solution, , Disp: , Rfl:     pravastatin (PRAVACHOL) 10 mg tablet, Take 1 tablet (10 mg total) by mouth every other day, Disp: 15 tablet, Rfl: 3    Vitals: Blood pressure 110/80, pulse 67, temperature 98 °F (36 7 °C), temperature source Temporal, height 5' 10" (1 778 m), weight 69 4 kg (153 lb), SpO2 97 %  Body mass index is 21 95 kg/m²  Vitals:    20 1415   Weight: 69 4 kg (153 lb)     BP Readings from Last 3 Encounters:   20 110/80   20 142/84   20 117/78         Physical Exam    Neurologic:  Alert & oriented x 3, no new focal deficits, Not in any acute distress,  Constitutional:  Well developed, well nourished, non-toxic appearance   Eyes:  Pupil equal and reacting to light, conjunctiva normal, No JVP, No LNP   HENT:  Atraumatic, oropharynx moist, Neck- normal range of motion, no tenderness,  Neck supple   Respiratory:  Bilateral air entry, mostly clear to auscultation  Cardiovascular: S1-S2 regular with a 3/6 pansystolic murmur  GI:  Soft, nondistended, normal bowel sounds, nontender, no hepatosplenomegaly appreciated  Musculoskeletal:  No edema, no tenderness, no deformities  Skin:  Well hydrated, no rash   Lymphatic:  No lymphadenopathy noted   Extremities:  No edema and distal pulses are present    Diagnostic Studies Review Cardio:  Echo reviewed    EKG:  Normal sinus rhythm heart rate 67 beats per minute  Minimal voltage for LVH T-wave abnormality in inferior leads    Cardiac testing:   Results for orders placed during the hospital encounter of 19   Echo complete with contrast if indicated    Narrative [address] [phone]    Transthoracic Echocardiogram  2D, M-mode, Doppler, and Color Doppler    Study date:  24-May-2019    Patient: Neftaly Ortega  MR number: [id number]  Account number: [de-identified]  : 1945  Age: 75 years  Gender: Male  Status: Outpatient  Location: Echo lab  Height: 70 in  Weight: 149 6 lb  BP: 110/ 70 mmHg    Indications: CVA    Diagnoses: I67 9 - Cerebrovascular disease, unspecified    Sonographer:  JELANI Wheeler  Primary Physician: Nedra Maldonado MD  Referring Physician: Nedra Maldonado MD  Group:  [facility]  Interpreting Physician:  Tessie Terrell MD    SUMMARY    LEFT VENTRICLE:  Systolic function was normal  Ejection fraction was estimated in the range of 55 % to 60 % to be 60 %  There were no regional wall motion abnormalities  Doppler parameters were consistent with abnormal left ventricular relaxation (grade 1 diastolic dysfunction)  LEFT ATRIUM:  The atrium was moderately dilated  RIGHT ATRIUM:  The atrium was mildly dilated  MITRAL VALVE:  There was trace regurgitation  AORTIC VALVE:  There was trace regurgitation  TRICUSPID VALVE:  There was trace regurgitation  The tricuspid jet envelope definition was inadequate for estimation of RV systolic pressure  There are no indirect findings (abnormal RV volume or geometry, altered pulmonary flow velocity profile, or leftward septal displacement) which  would suggest moderate or severe pulmonary hypertension  HISTORY: PRIOR HISTORY: CKD, BPH, GERD, Herpes    PROCEDURE: The procedure was performed in the echo lab  This was a routine study  The transthoracic approach was used  The study included complete 2D imaging, M-mode, complete spectral Doppler, and color Doppler  The heart rate was 68 bpm,  at the start of the study  Image quality was adequate  LEFT VENTRICLE: Size was normal  Systolic function was normal  Ejection fraction was estimated in the range of 55 % to 60 % to be 60 %  There were no regional wall motion abnormalities  Wall thickness was normal  No evidence of apical  thrombus   DOPPLER: Doppler parameters were consistent with abnormal left ventricular relaxation (grade 1 diastolic dysfunction)  RIGHT VENTRICLE: The size was normal  Systolic function was normal  Wall thickness was normal     LEFT ATRIUM: The atrium was moderately dilated  RIGHT ATRIUM: The atrium was mildly dilated  MITRAL VALVE: There was mild thickening  There was normal leaflet separation  DOPPLER: The transmitral velocity was within the normal range  There was no evidence for stenosis  There was trace regurgitation  AORTIC VALVE: The valve was trileaflet  Leaflets exhibited mildly increased thickness, mild calcification, and normal cuspal separation  DOPPLER: Transaortic velocity was within the normal range  There was no evidence for stenosis  There  was trace regurgitation  TRICUSPID VALVE: The valve structure was normal  There was normal leaflet separation  DOPPLER: The transtricuspid velocity was within the normal range  There was no evidence for stenosis  There was trace regurgitation  The tricuspid jet  envelope definition was inadequate for estimation of RV systolic pressure  There are no indirect findings (abnormal RV volume or geometry, altered pulmonary flow velocity profile, or leftward septal displacement) which would suggest  moderate or severe pulmonary hypertension  PULMONIC VALVE: Leaflets exhibited normal thickness, no calcification, and normal cuspal separation  DOPPLER: The transpulmonic velocity was within the normal range  There was no significant regurgitation  PERICARDIUM: There was no pericardial effusion  The pericardium was normal in appearance  AORTA: The root exhibited normal size  SYSTEMIC VEINS: IVC: The inferior vena cava was normal in size      SYSTEM MEASUREMENT TABLES    2D mode  AoR Diam 2D: 3 6 cm  LA Diam (2D): 4 5 cm  LA/Ao (2D): 1 25  FS (2D Teich): 28 2 %  IVSd (2D): 0 96 cm  LVDEV: 94 4 cmï¾³  LVESV: 42 8 cmï¾³  LVIDd(2D): 4 54 cm  LVISd (2D): 3 26 cm  LVPWd (2D): 1 cm  SV (Teich): 51 6 cmï¾³    Apical four chamber  LVEF A4C: 62 %    Unspecified Scan Mode  MV Peak A Jason: 843 mm/s  MV Peak E Jason  Mean: 747 mm/s  MVA (PHT): 3 38 cmï¾²  PHT: 65 ms  Max P mm[Hg]  V Max: 2170 mm/s  Vmax: 2370 mm/s  RA Area: 18 cmï¾²  RA Volume: 51 7 cmï¾³  TAPSE: 2 2 cm    [employer] Accredited Echocardiography Laboratory    Prepared and electronically signed by    Maureen Joshua MD  Signed 26-May-2019 16:36:08         Imaging:  Chest X-Ray:   No Chest XR results available for this patient  CT-scan of the chest:     No CTA results available for this patient  Lab Review   Lab Results   Component Value Date    WBC 7 25 2020    HGB 13 9 2020    HCT 41 9 2020    MCV 92 2020    RDW 12 6 2020     2020     BMP:  Lab Results   Component Value Date    SODIUM 137 2020    K 4 4 2020     2020    CO2 27 2020    BUN 22 2020    CREATININE 1 31 (H) 2020    GLUC 78 2019    GLUF 93 2020    CALCIUM 8 4 2020    EGFR 53 2020    MG 2 1 2020     LFT:  Lab Results   Component Value Date    AST 22 2020    ALT 21 2020    ALKPHOS 78 2020    TP 7 1 2020    ALB 3 7 2020      Lab Results   Component Value Date    TLT3CIFRMTET 2 708 2020     Lab Results   Component Value Date    HGBA1C 6 0 2016     Lipid Profile:   Lab Results   Component Value Date    CHOLESTEROL 177 2020    HDL 45 2020    LDLCALC 114 (H) 2020    TRIG 91 2020     Lab Results   Component Value Date    CHOLESTEROL 177 2020    CHOLESTEROL 193 2020     Lab Results   Component Value Date    CKTOTAL 130 2019    CKMB 2 7 2016    CKMBINDEX 1 3 2016           Dr Freddie Lynch MD [facility]      "This note has been constructed using a voice recognition system  Therefore there may be syntax, spelling, and/or grammatical errors   Please call if you have any questions  "

## 2020-08-31 ENCOUNTER — CONSULT (OUTPATIENT)
Dept: CARDIOLOGY CLINIC | Facility: CLINIC | Age: 75
End: 2020-08-31
Payer: MEDICARE

## 2020-08-31 VITALS
WEIGHT: 153 LBS | OXYGEN SATURATION: 97 % | BODY MASS INDEX: 21.9 KG/M2 | HEIGHT: 70 IN | SYSTOLIC BLOOD PRESSURE: 110 MMHG | TEMPERATURE: 98 F | HEART RATE: 67 BPM | DIASTOLIC BLOOD PRESSURE: 80 MMHG

## 2020-08-31 DIAGNOSIS — I49.9 IRREGULAR HEART RATE: ICD-10-CM

## 2020-08-31 DIAGNOSIS — I49.9 IRREGULAR HEART BEAT: ICD-10-CM

## 2020-08-31 DIAGNOSIS — D63.1 ANEMIA DUE TO STAGE 3 CHRONIC KIDNEY DISEASE (HCC): ICD-10-CM

## 2020-08-31 DIAGNOSIS — R94.31 ABNORMAL EKG: ICD-10-CM

## 2020-08-31 DIAGNOSIS — N18.30 ANEMIA DUE TO STAGE 3 CHRONIC KIDNEY DISEASE (HCC): ICD-10-CM

## 2020-08-31 DIAGNOSIS — N18.30 CKD (CHRONIC KIDNEY DISEASE), STAGE III (HCC): ICD-10-CM

## 2020-08-31 DIAGNOSIS — R03.0 ELEVATED BLOOD PRESSURE READING: ICD-10-CM

## 2020-08-31 DIAGNOSIS — E78.5 DYSLIPIDEMIA: ICD-10-CM

## 2020-08-31 PROCEDURE — 99205 OFFICE O/P NEW HI 60 MIN: CPT | Performed by: INTERNAL MEDICINE

## 2020-08-31 PROCEDURE — 93000 ELECTROCARDIOGRAM COMPLETE: CPT | Performed by: INTERNAL MEDICINE

## 2020-08-31 RX ORDER — PRAVASTATIN SODIUM 10 MG
10 TABLET ORAL EVERY OTHER DAY
Qty: 15 TABLET | Refills: 3 | Status: SHIPPED | OUTPATIENT
Start: 2020-08-31 | End: 2021-01-15 | Stop reason: SDUPTHER

## 2020-09-11 ENCOUNTER — APPOINTMENT (OUTPATIENT)
Dept: LAB | Facility: CLINIC | Age: 75
End: 2020-09-11
Payer: MEDICARE

## 2020-09-11 DIAGNOSIS — L03.114 CELLULITIS OF LEFT WRIST: ICD-10-CM

## 2020-09-11 DIAGNOSIS — N18.30 CKD (CHRONIC KIDNEY DISEASE), STAGE III (HCC): ICD-10-CM

## 2020-09-11 DIAGNOSIS — R21 RASH: ICD-10-CM

## 2020-09-11 DIAGNOSIS — R03.0 ELEVATED BLOOD PRESSURE READING: ICD-10-CM

## 2020-09-11 LAB
ERYTHROCYTE [DISTWIDTH] IN BLOOD BY AUTOMATED COUNT: 12.8 % (ref 11.6–15.1)
ERYTHROCYTE [SEDIMENTATION RATE] IN BLOOD: 3 MM/HOUR (ref 0–19)
HCT VFR BLD AUTO: 43.7 % (ref 36.5–49.3)
HGB BLD-MCNC: 14.3 G/DL (ref 12–17)
MCH RBC QN AUTO: 29.9 PG (ref 26.8–34.3)
MCHC RBC AUTO-ENTMCNC: 32.7 G/DL (ref 31.4–37.4)
MCV RBC AUTO: 91 FL (ref 82–98)
PLATELET # BLD AUTO: 146 THOUSANDS/UL (ref 149–390)
PMV BLD AUTO: 10.5 FL (ref 8.9–12.7)
RBC # BLD AUTO: 4.78 MILLION/UL (ref 3.88–5.62)
WBC # BLD AUTO: 5.33 THOUSAND/UL (ref 4.31–10.16)

## 2020-09-11 PROCEDURE — 86038 ANTINUCLEAR ANTIBODIES: CPT

## 2020-09-11 PROCEDURE — 85027 COMPLETE CBC AUTOMATED: CPT

## 2020-09-11 PROCEDURE — 86618 LYME DISEASE ANTIBODY: CPT

## 2020-09-11 PROCEDURE — 86430 RHEUMATOID FACTOR TEST QUAL: CPT

## 2020-09-11 PROCEDURE — 85652 RBC SED RATE AUTOMATED: CPT

## 2020-09-11 PROCEDURE — 36415 COLL VENOUS BLD VENIPUNCTURE: CPT

## 2020-09-12 LAB — B BURGDOR IGG+IGM SER-ACNC: <0.91 ISR (ref 0–0.9)

## 2020-09-14 ENCOUNTER — HOSPITAL ENCOUNTER (OUTPATIENT)
Dept: NON INVASIVE DIAGNOSTICS | Facility: HOSPITAL | Age: 75
Discharge: HOME | End: 2020-09-14
Attending: INTERNAL MEDICINE
Payer: MEDICARE

## 2020-09-14 ENCOUNTER — TRANSCRIBE ORDERS (OUTPATIENT)
Dept: ADMINISTRATIVE | Facility: HOSPITAL | Age: 75
End: 2020-09-14

## 2020-09-14 DIAGNOSIS — N18.30 ANEMIA DUE TO STAGE 3 CHRONIC KIDNEY DISEASE (HCC): ICD-10-CM

## 2020-09-14 DIAGNOSIS — D63.1 ANEMIA DUE TO STAGE 3 CHRONIC KIDNEY DISEASE (HCC): ICD-10-CM

## 2020-09-14 DIAGNOSIS — N18.30 CKD (CHRONIC KIDNEY DISEASE), STAGE III (HCC): ICD-10-CM

## 2020-09-14 DIAGNOSIS — R03.0 ELEVATED BLOOD PRESSURE READING: ICD-10-CM

## 2020-09-14 DIAGNOSIS — Z01.818 PRE-OP TESTING: Primary | ICD-10-CM

## 2020-09-14 DIAGNOSIS — I49.9 IRREGULAR HEART BEAT: ICD-10-CM

## 2020-09-14 DIAGNOSIS — R94.31 ABNORMAL EKG: ICD-10-CM

## 2020-09-14 DIAGNOSIS — E78.5 DYSLIPIDEMIA: ICD-10-CM

## 2020-09-14 LAB
CHEST PAIN STATEMENT: NORMAL
MAX DIASTOLIC BP: 80 MMHG
MAX HEART RATE: 169 BPM
MAX PREDICTED HEART RATE: 145 BPM
MAX. SYSTOLIC BP: 160 MMHG
PROTOCOL NAME: NORMAL
REASON FOR TERMINATION: NORMAL
RHEUMATOID FACT SER QL LA: NEGATIVE
RYE IGE QN: NEGATIVE
TARGET HR FORMULA: NORMAL
TEST INDICATION: NORMAL
TIME IN EXERCISE PHASE: NORMAL

## 2020-09-14 PROCEDURE — 93018 CV STRESS TEST I&R ONLY: CPT | Performed by: INTERNAL MEDICINE

## 2020-09-14 PROCEDURE — 93017 CV STRESS TEST TRACING ONLY: CPT

## 2020-09-14 PROCEDURE — 93227 XTRNL ECG REC<48 HR R&I: CPT | Performed by: INTERNAL MEDICINE

## 2020-09-14 PROCEDURE — 93016 CV STRESS TEST SUPVJ ONLY: CPT | Performed by: INTERNAL MEDICINE

## 2020-09-14 PROCEDURE — 93226 XTRNL ECG REC<48 HR SCAN A/R: CPT

## 2020-09-14 PROCEDURE — 93225 XTRNL ECG REC<48 HRS REC: CPT

## 2020-09-16 ENCOUNTER — TELEPHONE (OUTPATIENT)
Dept: CARDIOLOGY CLINIC | Facility: CLINIC | Age: 75
End: 2020-09-16

## 2020-09-16 NOTE — TELEPHONE ENCOUNTER
----- Message from Zenaida Helm MD sent at 9/16/2020  3:55 PM EDT -----  Pt's Patient's stress test is normal    Patient can keep regular appointment  Please call patient with the result

## 2020-09-17 ENCOUNTER — TELEPHONE (OUTPATIENT)
Dept: CARDIOLOGY CLINIC | Facility: CLINIC | Age: 75
End: 2020-09-17

## 2020-09-17 NOTE — TELEPHONE ENCOUNTER
Patient  Returned our call and is asking if he should be taking his metoprolol succinate (TOPROL-XL) 25 mg 24 hr tablet  He has not been on it since aug when he first came in to see us  Also he would like his results from his holter monitor when available  His echo results  Were given to him today   No further questions on this matter

## 2020-09-18 NOTE — TELEPHONE ENCOUNTER
I spoke with patient, made aware of recommendation  No further questions at this time  Protocol for Holter monitors was relayed to patient as well

## 2020-09-18 NOTE — TELEPHONE ENCOUNTER
How is his blood pressure and heart rate at this time  Based on that we will decide we will continue metoprolol or not

## 2020-09-18 NOTE — TELEPHONE ENCOUNTER
Blood pressure readings provided over the phone  9/13 - 104/71 pulse 78             119/74 pulse 77  9/14 -  127/76 pulse 70                132/76 pulse 67  9/17  -  112/64  Pulse 83 out of DMV               125/73  Pulse 68     Only one indication on 9/12 with irregular heartbeat but had bad sleep the night before  Patient has not been taking metoprolol

## 2020-09-21 ENCOUNTER — TELEPHONE (OUTPATIENT)
Dept: CARDIOLOGY CLINIC | Facility: CLINIC | Age: 75
End: 2020-09-21

## 2020-09-21 NOTE — TELEPHONE ENCOUNTER
----- Message from Ravi Howard MD sent at 9/18/2020  6:02 PM EDT -----  Pt's Holter shows no significant arrhthymias  Pt can keep his appointment  Please call patient

## 2020-09-21 NOTE — TELEPHONE ENCOUNTER
Patient made aware of Holter results  82 yo female with PMH of HTN, Hypothyroid, DMII, CKD stage 3, COPD c/w chronic hypoxic respiratory failure on 3L home O2 present after mechanical fall c/o left hip pain found to have L femoral neck fracture, s/p left hip hemiarthroplasty (9/20), developed hypercarbic respiratory failure on POD#5 and intubated on 9/26, extubated on 9/28, currently found to have bilateral pleural effusions and pulmonary edema in the context of multiple recent IVF boluses. Currently diuresing well.     # Hypercarbic and hypoxemic respiratory failure - resolved  - Hypoxemia was likely 2/2 to combination between established V/Q mismatch from COPD and pulmonary edema with associated basal atelectasis  - Continue to hold lasix, s/p dose of diamox with improvement in bicarb. Elevated bicarb likely 2/2 to overdiuresis.   - c/w symbicort and spiriva  - C/W steroid taper  - Titrate supplemental O2 to Spo2 > 88% and less than 94%. Decrease FiO2 as required.   - C/W incentive guanako. ambulate as tolerated.     Will sign off this case for now. Please feel free to re-consult if there are any further management questions that we can help with.    Dae Hyeon Kim MD-[id number]  Pulmonary Critical Care Fellow  Pager [phone]

## 2020-10-01 ENCOUNTER — OFFICE VISIT (OUTPATIENT)
Dept: NEPHROLOGY | Facility: CLINIC | Age: 75
End: 2020-10-01
Payer: MEDICARE

## 2020-10-01 VITALS
HEIGHT: 70 IN | BODY MASS INDEX: 21.62 KG/M2 | TEMPERATURE: 98.1 F | SYSTOLIC BLOOD PRESSURE: 128 MMHG | DIASTOLIC BLOOD PRESSURE: 66 MMHG | WEIGHT: 151 LBS

## 2020-10-01 DIAGNOSIS — N20.0 NEPHROLITHIASIS: ICD-10-CM

## 2020-10-01 DIAGNOSIS — E55.9 VITAMIN D INSUFFICIENCY: ICD-10-CM

## 2020-10-01 DIAGNOSIS — N18.31 STAGE 3A CHRONIC KIDNEY DISEASE (HCC): Primary | ICD-10-CM

## 2020-10-01 DIAGNOSIS — N28.1 RENAL CYST: ICD-10-CM

## 2020-10-01 PROCEDURE — 99214 OFFICE O/P EST MOD 30 MIN: CPT | Performed by: INTERNAL MEDICINE

## 2020-11-03 ENCOUNTER — OFFICE VISIT (OUTPATIENT)
Dept: CARDIOLOGY CLINIC | Facility: CLINIC | Age: 75
End: 2020-11-03
Payer: MEDICARE

## 2020-11-03 VITALS
HEIGHT: 70 IN | TEMPERATURE: 98.2 F | HEART RATE: 73 BPM | SYSTOLIC BLOOD PRESSURE: 114 MMHG | BODY MASS INDEX: 21.9 KG/M2 | WEIGHT: 153 LBS | DIASTOLIC BLOOD PRESSURE: 80 MMHG | OXYGEN SATURATION: 97 %

## 2020-11-03 DIAGNOSIS — I49.9 IRREGULAR HEART BEAT: ICD-10-CM

## 2020-11-03 DIAGNOSIS — N18.30 CKD (CHRONIC KIDNEY DISEASE), STAGE III (HCC): ICD-10-CM

## 2020-11-03 DIAGNOSIS — E78.00 HYPERCHOLESTEREMIA: ICD-10-CM

## 2020-11-03 DIAGNOSIS — R94.31 ABNORMAL EKG: ICD-10-CM

## 2020-11-03 PROCEDURE — 99214 OFFICE O/P EST MOD 30 MIN: CPT | Performed by: INTERNAL MEDICINE

## 2020-12-18 PROBLEM — D69.6 PLATELETS DECREASED (HCC): Status: ACTIVE | Noted: 2020-12-18

## 2020-12-21 ENCOUNTER — LAB (OUTPATIENT)
Dept: LAB | Facility: CLINIC | Age: 75
End: 2020-12-21
Payer: MEDICARE

## 2020-12-21 DIAGNOSIS — N18.30 STAGE 3 CHRONIC KIDNEY DISEASE, UNSPECIFIED WHETHER STAGE 3A OR 3B CKD (HCC): ICD-10-CM

## 2020-12-21 DIAGNOSIS — E78.00 HYPERCHOLESTEREMIA: ICD-10-CM

## 2020-12-21 LAB
ALBUMIN SERPL BCP-MCNC: 3.9 G/DL (ref 3.5–5)
ALP SERPL-CCNC: 89 U/L (ref 46–116)
ALT SERPL W P-5'-P-CCNC: 20 U/L (ref 12–78)
ANION GAP SERPL CALCULATED.3IONS-SCNC: 8 MMOL/L (ref 4–13)
AST SERPL W P-5'-P-CCNC: 22 U/L (ref 5–45)
BILIRUB SERPL-MCNC: 0.74 MG/DL (ref 0.2–1)
BUN SERPL-MCNC: 22 MG/DL (ref 5–25)
CALCIUM SERPL-MCNC: 9.3 MG/DL (ref 8.3–10.1)
CHLORIDE SERPL-SCNC: 108 MMOL/L (ref 100–108)
CHOLEST SERPL-MCNC: 173 MG/DL (ref 50–200)
CO2 SERPL-SCNC: 26 MMOL/L (ref 21–32)
CREAT SERPL-MCNC: 1.49 MG/DL (ref 0.6–1.3)
GFR SERPL CREATININE-BSD FRML MDRD: 45 ML/MIN/1.73SQ M
GLUCOSE P FAST SERPL-MCNC: 91 MG/DL (ref 65–99)
HDLC SERPL-MCNC: 52 MG/DL
LDLC SERPL CALC-MCNC: 104 MG/DL (ref 0–100)
NONHDLC SERPL-MCNC: 121 MG/DL
POTASSIUM SERPL-SCNC: 4.7 MMOL/L (ref 3.5–5.3)
PROT SERPL-MCNC: 7.2 G/DL (ref 6.4–8.2)
SODIUM SERPL-SCNC: 142 MMOL/L (ref 136–145)
TRIGL SERPL-MCNC: 87 MG/DL

## 2020-12-21 PROCEDURE — 80053 COMPREHEN METABOLIC PANEL: CPT

## 2020-12-21 PROCEDURE — 36415 COLL VENOUS BLD VENIPUNCTURE: CPT

## 2020-12-21 PROCEDURE — 80061 LIPID PANEL: CPT

## 2021-01-06 DIAGNOSIS — N18.31 STAGE 3A CHRONIC KIDNEY DISEASE (HCC): Primary | ICD-10-CM

## 2021-01-15 DIAGNOSIS — E78.5 DYSLIPIDEMIA: ICD-10-CM

## 2021-01-15 RX ORDER — PRAVASTATIN SODIUM 10 MG
10 TABLET ORAL EVERY OTHER DAY
Qty: 45 TABLET | Refills: 3 | Status: SHIPPED | OUTPATIENT
Start: 2021-01-15 | End: 2022-01-18 | Stop reason: SDUPTHER

## 2021-01-20 ENCOUNTER — APPOINTMENT (OUTPATIENT)
Dept: LAB | Facility: CLINIC | Age: 76
End: 2021-01-20
Payer: MEDICARE

## 2021-01-20 DIAGNOSIS — N18.31 STAGE 3A CHRONIC KIDNEY DISEASE (HCC): ICD-10-CM

## 2021-01-20 DIAGNOSIS — Z23 ENCOUNTER FOR IMMUNIZATION: ICD-10-CM

## 2021-01-20 LAB
ANION GAP SERPL CALCULATED.3IONS-SCNC: 2 MMOL/L (ref 4–13)
BUN SERPL-MCNC: 19 MG/DL (ref 5–25)
CALCIUM SERPL-MCNC: 9.4 MG/DL (ref 8.3–10.1)
CHLORIDE SERPL-SCNC: 109 MMOL/L (ref 100–108)
CO2 SERPL-SCNC: 29 MMOL/L (ref 21–32)
CREAT SERPL-MCNC: 1.47 MG/DL (ref 0.6–1.3)
GFR SERPL CREATININE-BSD FRML MDRD: 46 ML/MIN/1.73SQ M
GLUCOSE P FAST SERPL-MCNC: 87 MG/DL (ref 65–99)
POTASSIUM SERPL-SCNC: 4.8 MMOL/L (ref 3.5–5.3)
SODIUM SERPL-SCNC: 140 MMOL/L (ref 136–145)

## 2021-01-20 PROCEDURE — 80048 BASIC METABOLIC PNL TOTAL CA: CPT

## 2021-01-20 PROCEDURE — 36415 COLL VENOUS BLD VENIPUNCTURE: CPT

## 2021-02-05 ENCOUNTER — IMMUNIZATIONS (OUTPATIENT)
Dept: FAMILY MEDICINE CLINIC | Facility: HOSPITAL | Age: 76
End: 2021-02-05

## 2021-02-05 DIAGNOSIS — Z23 ENCOUNTER FOR IMMUNIZATION: Primary | ICD-10-CM

## 2021-02-05 PROCEDURE — 0011A SARS-COV-2 / COVID-19 MRNA VACCINE (MODERNA) 100 MCG: CPT | Performed by: INTERNAL MEDICINE

## 2021-02-05 PROCEDURE — 91301 SARS-COV-2 / COVID-19 MRNA VACCINE (MODERNA) 100 MCG: CPT | Performed by: INTERNAL MEDICINE

## 2021-03-05 ENCOUNTER — IMMUNIZATIONS (OUTPATIENT)
Dept: FAMILY MEDICINE CLINIC | Facility: HOSPITAL | Age: 76
End: 2021-03-05

## 2021-03-05 DIAGNOSIS — Z23 ENCOUNTER FOR IMMUNIZATION: Primary | ICD-10-CM

## 2021-03-05 PROCEDURE — 0012A SARS-COV-2 / COVID-19 MRNA VACCINE (MODERNA) 100 MCG: CPT

## 2021-03-05 PROCEDURE — 91301 SARS-COV-2 / COVID-19 MRNA VACCINE (MODERNA) 100 MCG: CPT

## 2021-03-19 ENCOUNTER — OFFICE VISIT (OUTPATIENT)
Dept: INTERNAL MEDICINE CLINIC | Facility: CLINIC | Age: 76
End: 2021-03-19
Payer: MEDICARE

## 2021-03-19 VITALS
OXYGEN SATURATION: 99 % | WEIGHT: 153 LBS | BODY MASS INDEX: 21.9 KG/M2 | TEMPERATURE: 97.1 F | HEART RATE: 65 BPM | DIASTOLIC BLOOD PRESSURE: 82 MMHG | HEIGHT: 70 IN | SYSTOLIC BLOOD PRESSURE: 136 MMHG

## 2021-03-19 DIAGNOSIS — N18.30 ANEMIA DUE TO STAGE 3 CHRONIC KIDNEY DISEASE, UNSPECIFIED WHETHER STAGE 3A OR 3B CKD (HCC): ICD-10-CM

## 2021-03-19 DIAGNOSIS — E78.00 HYPERCHOLESTEREMIA: ICD-10-CM

## 2021-03-19 DIAGNOSIS — Z12.11 SCREENING FOR COLON CANCER: ICD-10-CM

## 2021-03-19 DIAGNOSIS — K22.70 BARRETT'S ESOPHAGUS WITHOUT DYSPLASIA: Primary | ICD-10-CM

## 2021-03-19 DIAGNOSIS — D63.1 ANEMIA DUE TO STAGE 3 CHRONIC KIDNEY DISEASE, UNSPECIFIED WHETHER STAGE 3A OR 3B CKD (HCC): ICD-10-CM

## 2021-03-19 DIAGNOSIS — N18.31 STAGE 3A CHRONIC KIDNEY DISEASE (HCC): ICD-10-CM

## 2021-03-19 DIAGNOSIS — Z00.00 MEDICARE ANNUAL WELLNESS VISIT, SUBSEQUENT: ICD-10-CM

## 2021-03-19 DIAGNOSIS — Z11.59 ENCOUNTER FOR HEPATITIS C SCREENING TEST FOR LOW RISK PATIENT: ICD-10-CM

## 2021-03-19 PROBLEM — L03.114 CELLULITIS OF LEFT WRIST: Status: RESOLVED | Noted: 2020-09-11 | Resolved: 2021-03-19

## 2021-03-19 PROBLEM — R21 RASH: Status: RESOLVED | Noted: 2020-09-11 | Resolved: 2021-03-19

## 2021-03-19 PROCEDURE — 99214 OFFICE O/P EST MOD 30 MIN: CPT | Performed by: INTERNAL MEDICINE

## 2021-03-19 PROCEDURE — 1123F ACP DISCUSS/DSCN MKR DOCD: CPT | Performed by: INTERNAL MEDICINE

## 2021-03-19 PROCEDURE — G0439 PPPS, SUBSEQ VISIT: HCPCS | Performed by: INTERNAL MEDICINE

## 2021-03-19 NOTE — PROGRESS NOTES
[facility]en Office Visit Note  21     Barrington Cardenas 67 y o  male MRN: [id number]  : 1945    Assessment:     Shin esophagus  Continue famotidine 40 mg daily     Periodic EGD for surveillance  Suggestion for Gastroesophageal reflux Disease/Peptic Ulcer/hyperacidity/Shin's esophagus  1  Avoid Orange Juice/citrus Juice/Grapefruit juice      2  Avoid Caffeine  including cola, coffee, tea       3  No Tomato products of any kind including tomato sauce, ketch up , pizza sauce, marinara saucet etc       4  No mint, herbs, garlic, onions      5  No spicy food      6  No Alcohol of any type     7  No Smoking    8  Minimize/avoid stress      CKD (chronic kidney disease), stage III  Lab Results   Component Value Date    EGFR 46 2021    EGFR 45 2020    EGFR 53 2020    CREATININE 1 47 (H) 2021    CREATININE 1 49 (H) 2020    CREATININE 1 31 (H) 2020   GFR is baseline  Creat is baseline  Recommend periodic blood test for monitoring of BUN and Creat  Avoid Non Steroidal Antiinflammatory such as ibuprofen, aleve etc   Potassium, HCO3 and calcium are wnl and will require periodic blood test   Bone and Mineral disease monitoring as appropriate  All patient with GFR less than 30( CKD 4 or 5 or 6) advised to follow with nephrologist          Diagnoses and all orders for this visit:    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    Stage 3a chronic kidney disease    Hypercholesteremia    Anemia due to stage 3 chronic kidney disease, unspecified whether stage 3a or 3b CKD    Medicare annual wellness visit, subsequent    Screening for colon cancer  -     Cologuard; Future    Encounter for hepatitis C screening test for low risk patient  -     Hepatitis C antibody; Future          Discussion Summary and Plan: Today's care plan and medications were reviewed with patient in detail and all their questions answered to their satisfaction      Chief Complaint   Patient presents with    CKD III    Hyperlipidemia    GERD    Follow-up    Medicare Wellness Visit      Subjective: The the the patient was seen by cardiologist     Irregular heartbeat:  Patient underwent stress test which was unremarkable  Of metoprolol, seen by cardiologist    Remain Vitamin d and basa daily    Hyperlipidemia , on pravastatin now we will continue to monitor labs including lipid and lft      cardiac murmur he he did have a previous history of mild valvular disease Rec to check with cardiologist about echocardiogram     Previous echocardiogram from 2019 May wear reviewed  Normal ejection fraction moderate L a otherwise unremarkable  New echocardiogram awaited  Holter monitor awaited  PACs and PVCs    He is not feeling much palpitation except 1 or 2 time he had a couple of minor stressful situation and did feel that  Abnormal EKG with ST flattening again being worked up with stress test and echocardiogram     CKD level 2 3 will monitor  Anemia is not a major issue  Seen by Nephrologist and cardiologist , their notes noted  No visits with results within 2 Week(s) from this visit  Latest known visit with results is:  Appointment on 01/20/2021  Sodium                    Value: 140(mmol/L)        Dt: 01/20/2021  Potassium                 Value: 4 8(mmol/L)        Dt: 01/20/2021  Chloride                  Value: 109(mmol/L)*       Dt: 01/20/2021  CO2                       Value: 29(mmol/L)         Dt: 01/20/2021  ANION GAP                 Value:  2(mmol/L)*         Dt: 01/20/2021  BUN                       Value: 19(mg/dL)          Dt: 01/20/2021  Creatinine                Value: 1 47(mg/dL)*       Dt: 01/20/2021  Glucose, Fasting          Value: 87(mg/dL)          Dt: 01/20/2021  Calcium                   Value: 9 4(mg/dL)         Dt: 01/20/2021  eGFR                      Value: 46(ml/min/1 73sq m) Dt: 01/20/2021  ------------ - 2 weeks      Hospital Outpatient Visit on 09/14/2020  Protocol Name Value: JUAN JOSE              Dt: 09/14/2020  Time In Exercise Phase    Value: 00:06:30           Dt: 09/14/2020  MAX  SYSTOLIC BP          Value: 160(mmHg)          Dt: 04/40/1382  Max Diastolic Bp          Value: 80(mmHg)           Dt: 09/14/2020  Max Heart Rate            Value: 169(BPM)           Dt: 09/14/2020  Max Predicted Heart Rate  Value: 145(BPM)           Dt: 09/14/2020  Reason for Termination                              Dt: 09/14/2020                  Value: Target Heart Rate Achieved   Test Indication                                     Dt: 09/14/2020                  Value: Abnormal ECG   Target Hr Formular                                  Dt: 09/14/2020                  Value: (220 - Age)*100%   Chest Pain Statement      Value: none               Dt: 09/14/2020  ------------  Appointment on 09/11/2020  WBC                       Value: 5 33(Thousand/uL)  Dt: 09/11/2020  RBC                       Value: 4 78(Million/uL)   Dt: 09/11/2020  Hemoglobin                Value: 14 3(g/dL)         Dt: 09/11/2020  Hematocrit                Value: 43 7(%)            Dt: 09/11/2020  MCV                       Value: 91(fL)             Dt: 09/11/2020  MCH                       Value: 29 9(pg)           Dt: 09/11/2020  MCHC                      Value: 32 7(g/dL)         Dt: 09/11/2020  RDW                       Value: 12 8(%)            Dt: 09/11/2020  Platelets                 Value: 146(Thousands/uL)* Dt: 09/11/2020  MPV                       Value: 10 5(fL)           Dt: 09/11/2020  Lyme IgG/IgM Ab           Value: <0 91(ISR)         Dt: 09/11/2020  Sed Rate                  Value:  3(mm/hour)         Dt: 09/11/2020  TWAN                       Value: Negative           Dt: 09/11/2020  Rheumatoid Factor         Value: Negative           Dt: 09/11/2020  ------------  Appointment on 08/22/2020  WBC                       Value: 7 25(Thousand/uL)  Dt: 08/22/2020  RBC                       Value: 4 57(Million/uL)   Dt: 08/22/2020  Hemoglobin                Value: 13 9(g/dL)         Dt: 08/22/2020  Hematocrit                Value: 41 9(%)            Dt: 08/22/2020  MCV                       Value: 92(fL)             Dt: 08/22/2020  MCH                       Value: 30 4(pg)           Dt: 08/22/2020  MCHC                      Value: 33 2(g/dL)         Dt: 08/22/2020  RDW                       Value: 12 6(%)            Dt: 08/22/2020  Platelets                 Value: 195(Thousands/uL)  Dt: 08/22/2020  MPV                       Value: 10 0(fL)           Dt: 08/22/2020  Cholesterol               Value: 177(mg/dL)         Dt: 08/22/2020  Triglycerides             Value: 91(mg/dL)          Dt: 08/22/2020  HDL, Direct               Value: 45(mg/dL)          Dt: 08/22/2020  LDL Calculated            Value: 114(mg/dL)*        Dt: 08/22/2020  Non-HDL-Chol (CHOL-HDL)   Value: 132(mg/dl)         Dt: 08/22/2020  Sodium                    Value: 137(mmol/L)        Dt: 08/22/2020  Potassium                 Value: 4 4(mmol/L)        Dt: 08/22/2020  Chloride                  Value: 103(mmol/L)        Dt: 08/22/2020  CO2                       Value: 27(mmol/L)         Dt: 08/22/2020  ANION GAP                 Value:  7(mmol/L)          Dt: 08/22/2020  BUN                       Value: 22(mg/dL)          Dt: 08/22/2020  Creatinine                Value: 1 31(mg/dL)*       Dt: 08/22/2020  Glucose, Fasting          Value: 93(mg/dL)          Dt: 08/22/2020  Calcium                   Value: 8 4(mg/dL)         Dt: 08/22/2020  AST                       Value: 22(U/L)            Dt: 08/22/2020  ALT                       Value: 21(U/L)            Dt: 08/22/2020  Alkaline Phosphatase      Value: 78(U/L)            Dt: 08/22/2020  Total Protein             Value: 7 1(g/dL)          Dt: 08/22/2020  Albumin                   Value: 3 7(g/dL)          Dt: 08/22/2020  Total Bilirubin           Value: 0 70(mg/dL)        Dt: 08/22/2020  eGFR                      Value: 53(ml/min/1 73sq m) Dt: 08/22/2020  TSH 3RD GENERATON         Value: 2 708(uIU/mL)      Dt: 08/22/2020  Free T4                   Value: 1 02(ng/dL)        Dt: 08/22/2020  Creatinine, Ur            Value: 119  0(mg/dL)       Dt: 08/22/2020  Microalbum  ,U,Random      Value: <5 0(mg/L)         Dt: 08/22/2020  Microalb Creat Ratio      Value: <4(mg/g creatinine) Dt: 08/22/2020  Phosphorus                Value: 2 8(mg/dL)         Dt: 08/22/2020  PTH                       Value: 47  1(pg/mL)        Dt: 08/22/2020  Magnesium                 Value: 2 1(mg/dL)         Dt: 08/22/2020  Vit D, 25-Hydroxy         Value: 47  6(ng/mL)        Dt: 08/22/2020  ------------ - 6 moths labs            9-: stress test:JUAN JOSE PROTOCOL:  HR bpm SBP mmHg DBP mmHg Symptoms Rhythm/conduct  Baseline 105 122 74 none sinus tach  Stage 1 144 140 70 none same as above  Stage 2 160 152 74 none --  Stage 3 164 -- -- moderate fatigue same as above  Immediate 155 160 80 subsiding --  Recovery 1 116 140 70 none occasional PVC's  Recovery 2 99 130 76 none --  No medications or fluids given      STRESS SUMMARY: Duration of exercise was 6 min and 30 sec  The patient exercised to protocol stage 3  Maximal work rate was 8 5 METs  Maximal heart rate during stress was 169 bpm ( 117 % of maximal predicted heart rate)  The heart rate  response to stress was normal  There was normal resting blood pressure with an appropriate response to stress  The rate-pressure product for the peak heart rate and blood pressure was 09012  There was no chest pain during stress  The  stress test was terminated due to achievement of target heart rate and moderate fatigue      pre sao2= 99%  peak sao2= 99% The stress ECG was negative for ischemia  There were no stress arrhythmias or conduction abnormalities      SUMMARY:  -  Stress results: Duration of exercise was 6 min and 30 sec  Target heart rate was achieved  There was no chest pain during stress  -  ECG conclusions:  The stress ECG was negative for ischemia      IMPRESSIONS: Normal study after maximal exercise without reproduction of symptoms  The following portions of the patient's history were reviewed and updated as appropriate: allergies, current medications, past family history, past medical history, past social history, past surgical history and problem list     Review of Systems      Historical Information   Patient Active Problem List   Diagnosis    CKD (chronic kidney disease), stage III    Nephrolithiasis    Anemia    Heart burn    BPH (benign prostatic hyperplasia)    Shin esophagus    Hypercholesteremia    Irregular heart beat    Elevated blood pressure reading    Abnormal EKG    Platelets decreased ([address] )     Past Medical History:   Diagnosis Date    BPH (benign prostatic hyperplasia)     GERD (gastroesophageal reflux disease)     Herpes      History reviewed  No pertinent surgical history    Social History     Substance and Sexual Activity   Alcohol Use Yes    Alcohol/week: 1 0 - 2 0 standard drinks    Types: 1 - 2 Glasses of wine per week    Comment: occas     Social History     Substance and Sexual Activity   Drug Use No     Social History     Tobacco Use   Smoking Status Never Smoker   Smokeless Tobacco Never Used     Family History   Problem Relation Age of Onset    No Known Problems Mother     No Known Problems Father      Health Maintenance Due   Topic    Medicare Annual Wellness Visit (AWV)     DTaP,Tdap,and Td Vaccines (1 - Tdap)    Pneumococcal Vaccine: 65+ Years (1 of 1 - PPSV23)    Depression Screening PHQ       Meds/Allergies       Current Outpatient Medications:     aspirin (ECOTRIN LOW STRENGTH) 81 mg EC tablet, Take 81 mg by mouth daily, Disp: , Rfl:     cholecalciferol (VITAMIN D3) 1,000 units tablet, Take 1,000 Units by mouth daily, Disp: , Rfl:     famotidine (PEPCID) 40 MG tablet, Take 1 tablet (40 mg total) by mouth daily, Disp: 90 tablet, Rfl: 1    pravastatin (PRAVACHOL) 10 mg tablet, Take 1 tablet (10 mg total) by mouth every other day, Disp: 45 tablet, Rfl: 3    tadalafil (CIALIS) 20 MG tablet, Take 1 tablet (20 mg total) by mouth daily as needed for erectile dysfunction, Disp: 10 tablet, Rfl: 2    tamsulosin (Flomax) 0 4 mg, Take 1 capsule (0 4 mg total) by mouth daily with dinner, Disp: 90 capsule, Rfl: 1    vitamin B-12 (VITAMIN B-12) 1,000 mcg tablet, Take 1,000 mcg by mouth daily, Disp: , Rfl:       Objective:    Vitals:   Pulse 65   Temp (!) 97 1 °F (36 2 °C)   Ht 5' 10" (1 778 m)   Wt 69 4 kg (153 lb)   SpO2 99%   BMI 21 95 kg/m²   Body mass index is 21 95 kg/m²  Vitals:    03/19/21 1030 03/19/21 1043   Weight: 69 4 kg (153 lb) 69 4 kg (153 lb)       Physical Exam    Lab Review   Appointment on 01/20/2021   Component Date Value Ref Range Status    Sodium 01/20/2021 140  136 - 145 mmol/L Final    Potassium 01/20/2021 4 8  3 5 - 5 3 mmol/L Final    Chloride 01/20/2021 109* 100 - 108 mmol/L Final    CO2 01/20/2021 29  21 - 32 mmol/L Final    ANION GAP 01/20/2021 2* 4 - 13 mmol/L Final    BUN 01/20/2021 19  5 - 25 mg/dL Final    Creatinine 01/20/2021 1 47* 0 60 - 1 30 mg/dL Final    Standardized to IDMS reference method    Glucose, Fasting 01/20/2021 87  65 - 99 mg/dL Final    Specimen collection should occur prior to Sulfasalazine administration due to the potential for falsely depressed results  Specimen collection should occur prior to Sulfapyridine administration due to the potential for falsely elevated results   Calcium 01/20/2021 9 4  8 3 - 10 1 mg/dL Final    eGFR 01/20/2021 46  ml/min/1 73sq m Final         Patient Instructions       Medicare Preventive Visit Patient Instructions  Thank you for completing your Welcome to Medicare Visit or Medicare Annual Wellness Visit today  Your next wellness visit will be due in one year (3/20/2022)  The screening/preventive services that you may require over the next 5-10 years are detailed below  Some tests may not apply to you based off risk factors and/or age  Screening tests ordered at today's visit but not completed yet may show as past due  Also, please note that scanned in results may not display below  Preventive Screenings:  Service Recommendations Previous Testing/Comments   Colorectal Cancer Screening  · Colonoscopy    · Fecal Occult Blood Test (FOBT)/Fecal Immunochemical Test (FIT)  · Fecal DNA/Cologuard Test  · Flexible Sigmoidoscopy Age: 54-65 years old   Colonoscopy: every 10 years (May be performed more frequently if at higher risk)  OR  FOBT/FIT: every 1 year  OR  Cologuard: every 3 years  OR  Sigmoidoscopy: every 5 years  Screening may be recommended earlier than age 48 if at higher risk for colorectal cancer  Also, an individualized decision between you and your healthcare provider will decide whether screening between the ages of 74-80 would be appropriate  Colonoscopy: Not on file  FOBT/FIT: Not on file  Cologuard: Not on file  Sigmoidoscopy: Not on file          Prostate Cancer Screening Individualized decision between patient and health care provider in men between ages of 53-78   Medicare will cover every 12 months beginning on the day after your 50th birthday PSA: 1 2 ng/mL           Hepatitis C Screening Once for adults born between 1945 and 1965  More frequently in patients at high risk for Hepatitis C Hep C Antibody: Not on file        Diabetes Screening 1-2 times per year if you're at risk for diabetes or have pre-diabetes Fasting glucose: 87 mg/dL   A1C: 6 0 %        Cholesterol Screening Once every 5 years if you don't have a lipid disorder  May order more often based on risk factors  Lipid panel: 12/21/2020           Other Preventive Screenings Covered by Medicare:  1  Abdominal Aortic Aneurysm (AAA) Screening: covered once if your at risk   You're considered to be at risk if you have a family history of AAA or a male between the age of 73-68 who smoking at least 100 cigarettes in your lifetime  2  Lung Cancer Screening: covers low dose CT scan once per year if you meet all of the following conditions: (1) Age 50-69; (2) No signs or symptoms of lung cancer; (3) Current smoker or have quit smoking within the last 15 years; (4) You have a tobacco smoking history of at least 30 pack years (packs per day x number of years you smoked); (5) You get a written order from a healthcare provider  3  Glaucoma Screening: covered annually if you're considered high risk: (1) You have diabetes OR (2) Family history of glaucoma OR (3)  aged 48 and older OR (3)  American aged 72 and older  3  Osteoporosis Screening: covered every 2 years if you meet one of the following conditions: (1) Have a vertebral abnormality; (2) On glucocorticoid therapy for more than 3 months; (3) Have primary hyperparathyroidism; (4) On osteoporosis medications and need to assess response to drug therapy  5  HIV Screening: covered annually if you're between the age of 12-76  Also covered annually if you are younger than 13 and older than 72 with risk factors for HIV infection  For pregnant patients, it is covered up to 3 times per pregnancy  Immunizations:  Immunization Recommendations   Influenza Vaccine Annual influenza vaccination during flu season is recommended for all persons aged >= 6 months who do not have contraindications   Pneumococcal Vaccine (Prevnar and Pneumovax)  * Prevnar = PCV13  * Pneumovax = PPSV23 Adults 25-60 years old: 1-3 doses may be recommended based on certain risk factors  Adults 72 years old: Prevnar (PCV13) vaccine recommended followed by Pneumovax (PPSV23) vaccine  If already received PPSV23 since turning 65, then PCV13 recommended at least one year after PPSV23 dose     Hepatitis B Vaccine 3 dose series if at intermediate or high risk (ex: diabetes, end stage renal disease, liver disease)   Tetanus (Td) Vaccine - COST NOT COVERED BY MEDICARE PART B Following completion of primary series, a booster dose should be given every 10 years to maintain immunity against tetanus  Td may also be given as tetanus wound prophylaxis  Tdap Vaccine - COST NOT COVERED BY MEDICARE PART B Recommended at least once for all adults  For pregnant patients, recommended with each pregnancy  Shingles Vaccine (Shingrix) - COST NOT COVERED BY MEDICARE PART B  2 shot series recommended in those aged 48 and above     Health Maintenance Due:  There are no preventive care reminders to display for this patient  Immunizations Due:      Topic Date Due    DTaP,Tdap,and Td Vaccines (1 - Tdap) Never done    Pneumococcal Vaccine: 65+ Years (1 of 1 - PPSV23) Never done     Advance Directives   What are advance directives? Advance directives are legal documents that state your wishes and plans for medical care  These plans are made ahead of time in case you lose your ability to make decisions for yourself  Advance directives can apply to any medical decision, such as the treatments you want, and if you want to donate organs  What are the types of advance directives? There are many types of advance directives, and each state has rules about how to use them  You may choose a combination of any of the following:  · Living will: This is a written record of the treatment you want  You can also choose which treatments you do not want, which to limit, and which to stop at a certain time  This includes surgery, medicine, IV fluid, and tube feedings  · Durable power of  for healthcare [facility]): This is a written record that states who you want to make healthcare choices for you when you are unable to make them for yourself  This person, called a proxy, is usually a family member or a friend  You may choose more than 1 proxy  · Do not resuscitate (DNR) order:  A DNR order is used in case your heart stops beating or you stop breathing  It is a request not to have certain forms of treatment, such as CPR   A DNR order may be included in other types of advance directives  · Medical directive: This covers the care that you want if you are in a coma, near death, or unable to make decisions for yourself  You can list the treatments you want for each condition  Treatment may include pain medicine, surgery, blood transfusions, dialysis, IV or tube feedings, and a ventilator (breathing machine)  · Values history: This document has questions about your views, beliefs, and how you feel and think about life  This information can help others choose the care that you would choose  Why are advance directives important? An advance directive helps you control your care  Although spoken wishes may be used, it is better to have your wishes written down  Spoken wishes can be misunderstood, or not followed  Treatments may be given even if you do not want them  An advance directive may make it easier for your family to make difficult choices about your care  © Copyright [employer] 2018 Information is for End User's use only and may not be sold, redistributed or otherwise used for commercial purposes  All illustrations and images included in CareNotes® are the copyrighted property of A D A M , Inc  or [facility] Torrey Ji        Dr Cj Valderrama MD  [facility]       "This note has been constructed using a voice recognition system  Therefore there may be syntax, spelling, and/or grammatical errors  Please call if you have any questions  "   Assessment and Plan:     Problem List Items Addressed This Visit        Digestive    Shin esophagus - Primary       Genitourinary    CKD (chronic kidney disease), stage III       Other    Anemia    Hypercholesteremia      Other Visit Diagnoses     Medicare annual wellness visit, subsequent               Preventive health issues were discussed with patient, and age appropriate screening tests were ordered as noted in patient's After Visit Summary    Personalized health advice and appropriate referrals for health education or preventive services given if needed, as noted in patient's After Visit Summary  History of Present Illness:     Patient presents for Medicare Annual Wellness visit    Patient Care Team:  Adrienne Ramirez MD as PCP - General (Internal Medicine)  MD Rosangela Norman MD Saul Dach, MD     Problem List:     Patient Active Problem List   Diagnosis    CKD (chronic kidney disease), stage III    Nephrolithiasis    Anemia    Heart burn    BPH (benign prostatic hyperplasia)    Shin esophagus    Hypercholesteremia    Irregular heart beat    Elevated blood pressure reading    Abnormal EKG    Platelets decreased ([address] )      Past Medical and Surgical History:     Past Medical History:   Diagnosis Date    BPH (benign prostatic hyperplasia)     GERD (gastroesophageal reflux disease)     Herpes      History reviewed  No pertinent surgical history  Family History:     Family History   Problem Relation Age of Onset    No Known Problems Mother     No Known Problems Father       Social History:        Social History     Socioeconomic History    Marital status:      Spouse name: None    Number of children: None    Years of education: None    Highest education level: None   Occupational History    None   Social Needs    Financial resource strain: None    Food insecurity     Worry: None     Inability: None    Transportation needs     Medical: None     Non-medical: None   Tobacco Use    Smoking status: Never Smoker    Smokeless tobacco: Never Used   Substance and Sexual Activity    Alcohol use:  Yes     Alcohol/week: 1 0 - 2 0 standard drinks     Types: 1 - 2 Glasses of wine per week     Comment: occas    Drug use: No    Sexual activity: None   Lifestyle    Physical activity     Days per week: None     Minutes per session: None    Stress: None   Relationships    Social connections     Talks on phone: None     Gets together: None     Attends Confucianist service: None     Active member of club or organization: None     Attends meetings of clubs or organizations: None     Relationship status: None    Intimate partner violence     Fear of current or ex partner: None     Emotionally abused: None     Physically abused: None     Forced sexual activity: None   Other Topics Concern    None   Social History Narrative    None      Medications and Allergies:     Current Outpatient Medications   Medication Sig Dispense Refill    aspirin (ECOTRIN LOW STRENGTH) 81 mg EC tablet Take 81 mg by mouth daily      cholecalciferol (VITAMIN D3) 1,000 units tablet Take 1,000 Units by mouth daily      famotidine (PEPCID) 40 MG tablet Take 1 tablet (40 mg total) by mouth daily 90 tablet 1    pravastatin (PRAVACHOL) 10 mg tablet Take 1 tablet (10 mg total) by mouth every other day 45 tablet 3    tadalafil (CIALIS) 20 MG tablet Take 1 tablet (20 mg total) by mouth daily as needed for erectile dysfunction 10 tablet 2    tamsulosin (Flomax) 0 4 mg Take 1 capsule (0 4 mg total) by mouth daily with dinner 90 capsule 1    vitamin B-12 (VITAMIN B-12) 1,000 mcg tablet Take 1,000 mcg by mouth daily       No current facility-administered medications for this visit  No Known Allergies   Immunizations:     Immunization History   Administered Date(s) Administered    Influenza Split High Dose Preservative Free IM 10/18/2019    Influenza, high dose seasonal 0 7 mL 09/18/2020    SARS-CoV-2 / COVID-19 mRNA IM (Juan Zarate) 02/05/2021, 03/05/2021      Health Maintenance: There are no preventive care reminders to display for this patient  Topic Date Due    DTaP,Tdap,and Td Vaccines (1 - Tdap) Never done    Pneumococcal Vaccine: 65+ Years (1 of 1 - PPSV23) Never done      Medicare Health Risk Assessment:     Ht 5' 10" (1 778 m)   Wt 69 4 kg (153 lb)   BMI 21 95 kg/m²      Kayla Gil is here for his Subsequent Wellness visit   Last Medicare Wellness visit information reviewed, patient interviewed, no change since last AWV  Health Risk Assessment:   Patient rates overall health as very good  Patient feels that their physical health rating is same  Patient is satisfied with their life  Eyesight was rated as slightly worse  Hearing was rated as same  Patient feels that their emotional and mental health rating is same  Patients states they are never, rarely angry  Patient states they are sometimes unusually tired/fatigued  Pain experienced in the last 7 days has been none  Patient states that he has experienced no weight loss or gain in last 6 months  Weight stays the same  Depression Screening:   PHQ-2 Score: 0      Fall Risk Screening: In the past year, patient has experienced: no history of falling in past year      Home Safety:  Patient does not have trouble with stairs inside or outside of their home  Patient has working smoke alarms and has working carbon monoxide detector  Home safety hazards include: none  No home hazards  Pt feels safe  Nutrition:   Current diet is Regular  Eats balanced diet  Proteins and veggetable/    Medications:   Patient is currently taking over-the-counter supplements  OTC medications include: see medication list  Patient is able to manage medications  No issues with meds    Activities of Daily Living (ADLs)/Instrumental Activities of Daily Living (IADLs):   Walk and transfer into and out of bed and chair?: Yes  Dress and groom yourself?: Yes    Bathe or shower yourself?: Yes    Feed yourself? Yes  Do your laundry/housekeeping?: Yes  Manage your money, pay your bills and track your expenses?: Yes  Make your own meals?: Yes    Do your own shopping?: Yes    ADL comments: Pt is very independent  Previous Hospitalizations:   Any hospitalizations or ED visits within the last 12 months?: No      Hospitalization Comments: Chronivc conditins have been stable    Advance Care Planning:   Living will: Yes    Durable POA for healthcare:  Yes    Advanced directive: Yes    Advanced directive counseling given: No    Five wishes given: No    Patient declined ACP directive: Yes    End of Life Decisions reviewed with patient: Yes    Provider agrees with end of life decisions: Yes      Comments: All docs in place per pt  Cognitive Screening:   Provider or family/friend/caregiver concerned regarding cognition?: No    PREVENTIVE SCREENINGS      Cardiovascular Screening:    General: Screening Not Indicated and History Lipid Disorder      Diabetes Screening:     General: Screening Current      Colorectal Cancer Screening:     General: Risks and Benefits Discussed    Due for: Cologuard      Prostate Cancer Screening:    General: Screening Not Indicated      Osteoporosis Screening:    General: Risks and Benefits Discussed and Screening Not Indicated      Abdominal Aortic Aneurysm (AAA) Screening:        General: Risks and Benefits Discussed      Lung Cancer Screening:     General: Screening Not Indicated and Risks and Benefits Discussed    Screening, Brief Intervention, and Referral to Treatment (SBIRT)    Screening  Typical number of drinks in a day: 1  Typical number of drinks in a week: 1  Interpretation: Low risk drinking behavior      Single Item Drug Screening:  How often have you used an illegal drug (including marijuana) or a prescription medication for non-medical reasons in the past year? never    Single Item Drug Screen Score: 0  Interpretation: Negative screen for possible drug use disorder      Minerva Montoya MD

## 2021-03-19 NOTE — PATIENT INSTRUCTIONS
Medicare Preventive Visit Patient Instructions  Thank you for completing your Welcome to Medicare Visit or Medicare Annual Wellness Visit today  Your next wellness visit will be due in one year (3/20/2022)  The screening/preventive services that you may require over the next 5-10 years are detailed below  Some tests may not apply to you based off risk factors and/or age  Screening tests ordered at today's visit but not completed yet may show as past due  Also, please note that scanned in results may not display below  Preventive Screenings:  Service Recommendations Previous Testing/Comments   Colorectal Cancer Screening  · Colonoscopy    · Fecal Occult Blood Test (FOBT)/Fecal Immunochemical Test (FIT)  · Fecal DNA/Cologuard Test  · Flexible Sigmoidoscopy Age: 54-65 years old   Colonoscopy: every 10 years (May be performed more frequently if at higher risk)  OR  FOBT/FIT: every 1 year  OR  Cologuard: every 3 years  OR  Sigmoidoscopy: every 5 years  Screening may be recommended earlier than age 48 if at higher risk for colorectal cancer  Also, an individualized decision between you and your healthcare provider will decide whether screening between the ages of 74-80 would be appropriate  Colonoscopy: Not on file  FOBT/FIT: Not on file  Cologuard: Not on file  Sigmoidoscopy: Not on file          Prostate Cancer Screening Individualized decision between patient and health care provider in men between ages of 53-78   Medicare will cover every 12 months beginning on the day after your 50th birthday PSA: 1 2 ng/mL           Hepatitis C Screening Once for adults born between 1945 and 1965  More frequently in patients at high risk for Hepatitis C Hep C Antibody: Not on file        Diabetes Screening 1-2 times per year if you're at risk for diabetes or have pre-diabetes Fasting glucose: 87 mg/dL   A1C: 6 0 %        Cholesterol Screening Once every 5 years if you don't have a lipid disorder   May order more often based on risk factors  Lipid panel: 12/21/2020           Other Preventive Screenings Covered by Medicare:  1  Abdominal Aortic Aneurysm (AAA) Screening: covered once if your at risk  You're considered to be at risk if you have a family history of AAA or a male between the age of 73-68 who smoking at least 100 cigarettes in your lifetime  2  Lung Cancer Screening: covers low dose CT scan once per year if you meet all of the following conditions: (1) Age 50-69; (2) No signs or symptoms of lung cancer; (3) Current smoker or have quit smoking within the last 15 years; (4) You have a tobacco smoking history of at least 30 pack years (packs per day x number of years you smoked); (5) You get a written order from a healthcare provider  3  Glaucoma Screening: covered annually if you're considered high risk: (1) You have diabetes OR (2) Family history of glaucoma OR (3)  aged 48 and older OR (3)  American aged 72 and older  3  Osteoporosis Screening: covered every 2 years if you meet one of the following conditions: (1) Have a vertebral abnormality; (2) On glucocorticoid therapy for more than 3 months; (3) Have primary hyperparathyroidism; (4) On osteoporosis medications and need to assess response to drug therapy  5  HIV Screening: covered annually if you're between the age of 12-76  Also covered annually if you are younger than 13 and older than 72 with risk factors for HIV infection  For pregnant patients, it is covered up to 3 times per pregnancy      Immunizations:  Immunization Recommendations   Influenza Vaccine Annual influenza vaccination during flu season is recommended for all persons aged >= 6 months who do not have contraindications   Pneumococcal Vaccine (Prevnar and Pneumovax)  * Prevnar = PCV13  * Pneumovax = PPSV23 Adults 25-60 years old: 1-3 doses may be recommended based on certain risk factors  Adults 72 years old: Prevnar (PCV13) vaccine recommended followed by Pneumovax (PPSV23) vaccine  If already received PPSV23 since turning 65, then PCV13 recommended at least one year after PPSV23 dose  Hepatitis B Vaccine 3 dose series if at intermediate or high risk (ex: diabetes, end stage renal disease, liver disease)   Tetanus (Td) Vaccine - COST NOT COVERED BY MEDICARE PART B Following completion of primary series, a booster dose should be given every 10 years to maintain immunity against tetanus  Td may also be given as tetanus wound prophylaxis  Tdap Vaccine - COST NOT COVERED BY MEDICARE PART B Recommended at least once for all adults  For pregnant patients, recommended with each pregnancy  Shingles Vaccine (Shingrix) - COST NOT COVERED BY MEDICARE PART B  2 shot series recommended in those aged 48 and above     Health Maintenance Due:  There are no preventive care reminders to display for this patient  Immunizations Due:      Topic Date Due    DTaP,Tdap,and Td Vaccines (1 - Tdap) Never done    Pneumococcal Vaccine: 65+ Years (1 of 1 - PPSV23) Never done     Advance Directives   What are advance directives? Advance directives are legal documents that state your wishes and plans for medical care  These plans are made ahead of time in case you lose your ability to make decisions for yourself  Advance directives can apply to any medical decision, such as the treatments you want, and if you want to donate organs  What are the types of advance directives? There are many types of advance directives, and each state has rules about how to use them  You may choose a combination of any of the following:  · Living will: This is a written record of the treatment you want  You can also choose which treatments you do not want, which to limit, and which to stop at a certain time  This includes surgery, medicine, IV fluid, and tube feedings  · Durable power of  for healthcare [facility]):   This is a written record that states who you want to make healthcare choices for you when you are unable to make them for yourself  This person, called a proxy, is usually a family member or a friend  You may choose more than 1 proxy  · Do not resuscitate (DNR) order:  A DNR order is used in case your heart stops beating or you stop breathing  It is a request not to have certain forms of treatment, such as CPR  A DNR order may be included in other types of advance directives  · Medical directive: This covers the care that you want if you are in a coma, near death, or unable to make decisions for yourself  You can list the treatments you want for each condition  Treatment may include pain medicine, surgery, blood transfusions, dialysis, IV or tube feedings, and a ventilator (breathing machine)  · Values history: This document has questions about your views, beliefs, and how you feel and think about life  This information can help others choose the care that you would choose  Why are advance directives important? An advance directive helps you control your care  Although spoken wishes may be used, it is better to have your wishes written down  Spoken wishes can be misunderstood, or not followed  Treatments may be given even if you do not want them  An advance directive may make it easier for your family to make difficult choices about your care  © Copyright [employer] 2018 Information is for End User's use only and may not be sold, redistributed or otherwise used for commercial purposes  All illustrations and images included in CareNotes® are the copyrighted property of [employer]  or Paige Agudelo    Follow with Consultants as per their and our suggestion    Follow up in 12 week(s) or as needed earlier    Follow all instructions as advised and discussed  Take your medications as prescribed  Call the office immediately if you experience any side effects  Ask questions if you do not understand      Keep your scheduled appointment as advised or come sooner if necessary or in doubt  Best time to call for non-urgent matter or questions on weekdays is between 9am and 12 noon  See physician for any new symptoms or worsening of current symptoms  Urgent or emergent situations call 911 and report to nearest emergency room      I spent  30 -40 minutes taking care of this patient including clinical care, conseling, collaboration, chart, lab and consultaion review as appropriate    Patient is to get labs 1 week(s) prior to next visit if advised

## 2021-03-19 NOTE — ASSESSMENT & PLAN NOTE
Lab Results   Component Value Date    EGFR 46 01/20/2021    EGFR 45 12/21/2020    EGFR 53 08/22/2020    CREATININE 1 47 (H) 01/20/2021    CREATININE 1 49 (H) 12/21/2020    CREATININE 1 31 (H) 08/22/2020   GFR is baseline  Creat is baseline  Recommend periodic blood test for monitoring of BUN and Creat  Avoid Non Steroidal Antiinflammatory such as ibuprofen, aleve etc   Potassium, HCO3 and calcium are wnl and will require periodic blood test   Bone and Mineral disease monitoring as appropriate    All patient with GFR less than 30( CKD 4 or 5 or 6) advised to follow with nephrologist

## 2021-03-19 NOTE — ASSESSMENT & PLAN NOTE
Continue famotidine 40 mg daily     Periodic EGD for surveillance  Suggestion for Gastroesophageal reflux Disease/Peptic Ulcer/hyperacidity/Shin's esophagus  1  Avoid Orange Juice/citrus Juice/Grapefruit juice      2  Avoid Caffeine  including cola, coffee, tea       3  No Tomato products of any kind including tomato sauce, ketch up , pizza sauce, marinara saucet etc       4  No mint, herbs, garlic, onions      5  No spicy food      6  No Alcohol of any type     7  No Smoking    8   Minimize/avoid stress

## 2021-03-19 NOTE — PROGRESS NOTES
[facility] Office Visit Note  21     Solomon Gandara Reglorenzo 67 y o  male MRN: [id number]  : 1945    Assessment:     Shin esophagus  Continue famotidine 40 mg daily     Periodic EGD for surveillance  Suggestion for Gastroesophageal reflux Disease/Peptic Ulcer/hyperacidity/Shin's esophagus  1  Avoid Orange Juice/citrus Juice/Grapefruit juice      2  Avoid Caffeine  including cola, coffee, tea       3  No Tomato products of any kind including tomato sauce, ketch up , pizza sauce, marinara saucet etc       4  No mint, herbs, garlic, onions      5  No spicy food      6  No Alcohol of any type     7  No Smoking    8  Minimize/avoid stress      CKD (chronic kidney disease), stage III  Lab Results   Component Value Date    EGFR 46 2021    EGFR 45 2020    EGFR 53 2020    CREATININE 1 47 (H) 2021    CREATININE 1 49 (H) 2020    CREATININE 1 31 (H) 2020   GFR is baseline  Creat is baseline  Recommend periodic blood test for monitoring of BUN and Creat  Avoid Non Steroidal Antiinflammatory such as ibuprofen, aleve etc   Potassium, HCO3 and calcium are wnl and will require periodic blood test   Bone and Mineral disease monitoring as appropriate  All patient with GFR less than 30( CKD 4 or 5 or 6) advised to follow with nephrologist          Diagnoses and all orders for this visit:    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    Stage 3a chronic kidney disease    Hypercholesteremia    Anemia due to stage 3 chronic kidney disease, unspecified whether stage 3a or 3b CKD    Medicare annual wellness visit, subsequent    Screening for colon cancer  -     Cologuard; Future    Encounter for hepatitis C screening test for low risk patient  -     Hepatitis C antibody; Future          Discussion Summary and Plan: Today's care plan and medications were reviewed with patient in detail and all their questions answered to their satisfaction  And PVCs felt but his clinically asymptomatic    Of patient will follow with cardiologist regarding mild valvular disease in 9 months  Of  atient is doing very well  Hyperlipidemia tolerating statin due for lipid profile prior to next visit  CKD level 2/3 creatinine in the range of 1 4-1 5 baseline  Being followed by nephrologist   Anemia resolved and hemoglobin remains stable  Home occasional PACs  Clinically symptom-free  He got his COVID vaccine  67year-old no need for PSA    Shin's is stable  He remains on Pepcid 40 mg daily tolerating without side effect  Patient will be following with nephrologist in fall  Upper GI endoscopy by gastroenterologist periodically  Going for Cologuard  Chief Complaint   Patient presents with    CKD III    Hyperlipidemia    GERD    Follow-up    Medicare Wellness Visit      Subjective: The the the patient was seen by cardiologist     Irregular heartbeat: No palpitation, dizziness, no cardiac complains, pt cut down on chocolate and caffiene  Patient underwent stress test which was unremarkable  OfF metoprolol, seen by cardiologist    Remain Vitamin d and basa daily    Hyperlipidemia , on pravastatin now we will continue to monitor labs including lipid and lft      cardiac murmur he he did have a previous history of mild valvular disease Rec to check with cardiologist about echocardiogram     Previous echocardiogram from 2019 May wear reviewed  Normal ejection fraction moderate L a otherwise unremarkable  New echocardiogram awaited  Holter monitor awaited  PACs and PVCs    He is not feeling much palpitation except 1 or 2 time he had a couple of minor stressful situation and did feel that  Abnormal EKG with ST flattening again being worked up with stress test and echocardiogram     CKD level 2 3 will monitor  Anemia is not a major issue  Seen by Nephrologist and cardiologist , their notes noted  No visits with results within 1 Month(s) from this visit    Latest known visit with results is:  Appointment on 01/20/2021  Sodium                    Value: 140(mmol/L)        Dt: 01/20/2021  Potassium                 Value: 4 8(mmol/L)        Dt: 01/20/2021  Chloride                  Value: 109(mmol/L)*       Dt: 01/20/2021  CO2                       Value: 29(mmol/L)         Dt: 01/20/2021  ANION GAP                 Value: 2(mmol/L)*         Dt: 01/20/2021  BUN                       Value: 19(mg/dL)          Dt: 01/20/2021  Creatinine                Value: 1 47(mg/dL)*       Dt: 01/20/2021  Glucose, Fasting          Value: 87(mg/dL)          Dt: 01/20/2021  Calcium                   Value: 9 4(mg/dL)         Dt: 01/20/2021  eGFR                      Value: 46(ml/min/1 73sq m) Dt: 01/20/2021  ------------ - 2 month labs    12/21/2020   Creatinine 1 49  Rest CMP lipid profile unremarkable  September 2020:  CBC was normal with hemoglobin 14 3  ------------  Appointment on 08/22/2020  WBC                       Value: 7 25(Thousand/uL)  Dt: 08/22/2020  RBC                       Value: 4 57(Million/uL)   Dt: 08/22/2020    TSH 3RD GENERATON         Value: 2 708(uIU/mL)      Dt: 08/22/2020  Free T4                   Value: 1 02(ng/dL)        Dt: 08/22/2020  Creatinine, Ur            Value: 119  0(mg/dL)       Dt: 08/22/2020  Microalbum  ,U,Random      Value: <5 0(mg/L)         Dt: 08/22/2020  Microalb Creat Ratio      Value: <4(mg/g creatinine) Dt: 08/22/2020  Phosphorus                Value: 2 8(mg/dL)         Dt: 08/22/2020  PTH                       Value: 47  1(pg/mL)        Dt: 08/22/2020  Magnesium                 Value: 2 1(mg/dL)         Dt: 08/22/2020  Vit D, 25-Hydroxy         Value: 47  6(ng/mL)        Dt: 08/22/2020  ------------ - 6 moths labs            9-: stress test:JUAN JOSE PROTOCOL:    -  ECG conclusions: The stress ECG was negative for ischemia      IMPRESSIONS: Normal study after maximal exercise without reproduction of symptoms              The following portions of the patient's history were reviewed and updated as appropriate: allergies, current medications, past family history, past medical history, past social history, past surgical history and problem list     Review of Systems   Constitutional: Negative for chills, fatigue, fever and unexpected weight change  HENT: Negative  Respiratory: Negative for cough and shortness of breath  Cardiovascular: Negative for chest pain, palpitations and leg swelling  Gastrointestinal: Negative for abdominal pain, diarrhea and nausea  Endocrine: Negative for cold intolerance, polydipsia and polyuria  Genitourinary: Negative for dysuria, frequency and urgency  Musculoskeletal: Negative for arthralgias, gait problem and myalgias  Neurological: Negative for dizziness and headaches  Hematological: Negative  Psychiatric/Behavioral: Negative  Historical Information   Patient Active Problem List   Diagnosis    CKD (chronic kidney disease), stage III    Nephrolithiasis    Anemia    Heart burn    BPH (benign prostatic hyperplasia)    Shin esophagus    Hypercholesteremia    Irregular heart beat    Elevated blood pressure reading    Abnormal EKG    Platelets decreased ([address] )     Past Medical History:   Diagnosis Date    BPH (benign prostatic hyperplasia)     GERD (gastroesophageal reflux disease)     Herpes      History reviewed  No pertinent surgical history    Social History     Substance and Sexual Activity   Alcohol Use Yes    Alcohol/week: 1 0 - 2 0 standard drinks    Types: 1 - 2 Glasses of wine per week    Comment: occas     Social History     Substance and Sexual Activity   Drug Use No     Social History     Tobacco Use   Smoking Status Never Smoker   Smokeless Tobacco Never Used     Family History   Problem Relation Age of Onset    No Known Problems Mother     No Known Problems Father      Health Maintenance Due   Topic    Medicare Annual Wellness Visit (AWV)     DTaP,Tdap,and Td Vaccines (1 - Tdap)  Pneumococcal Vaccine: 65+ Years (1 of 1 - PPSV23)    Depression Screening PHQ       Meds/Allergies       Current Outpatient Medications:     aspirin (ECOTRIN LOW STRENGTH) 81 mg EC tablet, Take 81 mg by mouth daily, Disp: , Rfl:     cholecalciferol (VITAMIN D3) 1,000 units tablet, Take 1,000 Units by mouth daily, Disp: , Rfl:     famotidine (PEPCID) 40 MG tablet, Take 1 tablet (40 mg total) by mouth daily, Disp: 90 tablet, Rfl: 1    pravastatin (PRAVACHOL) 10 mg tablet, Take 1 tablet (10 mg total) by mouth every other day, Disp: 45 tablet, Rfl: 3    tadalafil (CIALIS) 20 MG tablet, Take 1 tablet (20 mg total) by mouth daily as needed for erectile dysfunction, Disp: 10 tablet, Rfl: 2    tamsulosin (Flomax) 0 4 mg, Take 1 capsule (0 4 mg total) by mouth daily with dinner, Disp: 90 capsule, Rfl: 1    vitamin B-12 (VITAMIN B-12) 1,000 mcg tablet, Take 1,000 mcg by mouth daily, Disp: , Rfl:       Objective:    Vitals:   Pulse 65   Temp (!) 97 1 °F (36 2 °C)   Ht 5' 10" (1 778 m)   Wt 69 4 kg (153 lb)   SpO2 99%   BMI 21 95 kg/m²   Body mass index is 21 95 kg/m²  Vitals:    03/19/21 1030 03/19/21 1043   Weight: 69 4 kg (153 lb) 69 4 kg (153 lb)       Physical Exam  Constitutional:       Appearance: He is well-developed  HENT:      Head: Normocephalic and atraumatic  Eyes:      Conjunctiva/sclera: Conjunctivae normal    Neck:      Thyroid: No thyroid mass or thyromegaly  Vascular: No carotid bruit or JVD  Cardiovascular:      Rate and Rhythm: Regular rhythm  Heart sounds: Normal heart sounds  Pulmonary:      Effort: No respiratory distress  Breath sounds: Normal breath sounds  No wheezing or rales  Abdominal:      General: Bowel sounds are normal  There is no distension  Palpations: There is no mass  Tenderness: There is no rebound  Musculoskeletal:      Right lower leg: No edema  Left lower leg: No edema  Lymphadenopathy:      Cervical: No cervical adenopathy  Skin:     General: Skin is warm  Findings: No rash  Neurological:      Coordination: Coordination normal    Psychiatric:         Behavior: Behavior normal          Judgment: Judgment normal          Lab Review   Appointment on 01/20/2021   Component Date Value Ref Range Status    Sodium 01/20/2021 140  136 - 145 mmol/L Final    Potassium 01/20/2021 4 8  3 5 - 5 3 mmol/L Final    Chloride 01/20/2021 109* 100 - 108 mmol/L Final    CO2 01/20/2021 29  21 - 32 mmol/L Final    ANION GAP 01/20/2021 2* 4 - 13 mmol/L Final    BUN 01/20/2021 19  5 - 25 mg/dL Final    Creatinine 01/20/2021 1 47* 0 60 - 1 30 mg/dL Final    Standardized to IDMS reference method    Glucose, Fasting 01/20/2021 87  65 - 99 mg/dL Final    Specimen collection should occur prior to Sulfasalazine administration due to the potential for falsely depressed results  Specimen collection should occur prior to Sulfapyridine administration due to the potential for falsely elevated results   Calcium 01/20/2021 9 4  8 3 - 10 1 mg/dL Final    eGFR 01/20/2021 46  ml/min/1 73sq m Final         Patient Instructions       Medicare Preventive Visit Patient Instructions  Thank you for completing your Welcome to Medicare Visit or Medicare Annual Wellness Visit today  Your next wellness visit will be due in one year (3/20/2022)  The screening/preventive services that you may require over the next 5-10 years are detailed below  Some tests may not apply to you based off risk factors and/or age  Screening tests ordered at today's visit but not completed yet may show as past due  Also, please note that scanned in results may not display below    Preventive Screenings:  Service Recommendations Previous Testing/Comments   Colorectal Cancer Screening  · Colonoscopy    · Fecal Occult Blood Test (FOBT)/Fecal Immunochemical Test (FIT)  · Fecal DNA/Cologuard Test  · Flexible Sigmoidoscopy Age: 54-65 years old   Colonoscopy: every 10 years (May be performed more frequently if at higher risk)  OR  FOBT/FIT: every 1 year  OR  Cologuard: every 3 years  OR  Sigmoidoscopy: every 5 years  Screening may be recommended earlier than age 48 if at higher risk for colorectal cancer  Also, an individualized decision between you and your healthcare provider will decide whether screening between the ages of 74-80 would be appropriate  Colonoscopy: Not on file  FOBT/FIT: Not on file  Cologuard: Not on file  Sigmoidoscopy: Not on file          Prostate Cancer Screening Individualized decision between patient and health care provider in men between ages of 53-78   Medicare will cover every 12 months beginning on the day after your 50th birthday PSA: 1 2 ng/mL           Hepatitis C Screening Once for adults born between 1945 and 1965  More frequently in patients at high risk for Hepatitis C Hep C Antibody: Not on file        Diabetes Screening 1-2 times per year if you're at risk for diabetes or have pre-diabetes Fasting glucose: 87 mg/dL   A1C: 6 0 %        Cholesterol Screening Once every 5 years if you don't have a lipid disorder  May order more often based on risk factors  Lipid panel: 12/21/2020           Other Preventive Screenings Covered by Medicare:  1  Abdominal Aortic Aneurysm (AAA) Screening: covered once if your at risk  You're considered to be at risk if you have a family history of AAA or a male between the age of 73-68 who smoking at least 100 cigarettes in your lifetime  2  Lung Cancer Screening: covers low dose CT scan once per year if you meet all of the following conditions: (1) Age 50-69; (2) No signs or symptoms of lung cancer; (3) Current smoker or have quit smoking within the last 15 years; (4) You have a tobacco smoking history of at least 30 pack years (packs per day x number of years you smoked); (5) You get a written order from a healthcare provider    3  Glaucoma Screening: covered annually if you're considered high risk: (1) You have diabetes OR (2) Family history of glaucoma OR (3)  aged 48 and older OR (3)  American aged 72 and older  3  Osteoporosis Screening: covered every 2 years if you meet one of the following conditions: (1) Have a vertebral abnormality; (2) On glucocorticoid therapy for more than 3 months; (3) Have primary hyperparathyroidism; (4) On osteoporosis medications and need to assess response to drug therapy  5  HIV Screening: covered annually if you're between the age of 12-76  Also covered annually if you are younger than 13 and older than 72 with risk factors for HIV infection  For pregnant patients, it is covered up to 3 times per pregnancy  Immunizations:  Immunization Recommendations   Influenza Vaccine Annual influenza vaccination during flu season is recommended for all persons aged >= 6 months who do not have contraindications   Pneumococcal Vaccine (Prevnar and Pneumovax)  * Prevnar = PCV13  * Pneumovax = PPSV23 Adults 25-60 years old: 1-3 doses may be recommended based on certain risk factors  Adults 72 years old: Prevnar (PCV13) vaccine recommended followed by Pneumovax (PPSV23) vaccine  If already received PPSV23 since turning 65, then PCV13 recommended at least one year after PPSV23 dose  Hepatitis B Vaccine 3 dose series if at intermediate or high risk (ex: diabetes, end stage renal disease, liver disease)   Tetanus (Td) Vaccine - COST NOT COVERED BY MEDICARE PART B Following completion of primary series, a booster dose should be given every 10 years to maintain immunity against tetanus  Td may also be given as tetanus wound prophylaxis  Tdap Vaccine - COST NOT COVERED BY MEDICARE PART B Recommended at least once for all adults  For pregnant patients, recommended with each pregnancy     Shingles Vaccine (Shingrix) - COST NOT COVERED BY MEDICARE PART B  2 shot series recommended in those aged 48 and above     Health Maintenance Due:  There are no preventive care reminders to display for this patient  Immunizations Due:      Topic Date Due    DTaP,Tdap,and Td Vaccines (1 - Tdap) Never done    Pneumococcal Vaccine: 65+ Years (1 of 1 - PPSV23) Never done     Advance Directives   What are advance directives? Advance directives are legal documents that state your wishes and plans for medical care  These plans are made ahead of time in case you lose your ability to make decisions for yourself  Advance directives can apply to any medical decision, such as the treatments you want, and if you want to donate organs  What are the types of advance directives? There are many types of advance directives, and each state has rules about how to use them  You may choose a combination of any of the following:  · Living will: This is a written record of the treatment you want  You can also choose which treatments you do not want, which to limit, and which to stop at a certain time  This includes surgery, medicine, IV fluid, and tube feedings  · Durable power of  for healthcare [facility]): This is a written record that states who you want to make healthcare choices for you when you are unable to make them for yourself  This person, called a proxy, is usually a family member or a friend  You may choose more than 1 proxy  · Do not resuscitate (DNR) order:  A DNR order is used in case your heart stops beating or you stop breathing  It is a request not to have certain forms of treatment, such as CPR  A DNR order may be included in other types of advance directives  · Medical directive: This covers the care that you want if you are in a coma, near death, or unable to make decisions for yourself  You can list the treatments you want for each condition  Treatment may include pain medicine, surgery, blood transfusions, dialysis, IV or tube feedings, and a ventilator (breathing machine)  · Values history: This document has questions about your views, beliefs, and how you feel and think about life   This information can help others choose the care that you would choose  Why are advance directives important? An advance directive helps you control your care  Although spoken wishes may be used, it is better to have your wishes written down  Spoken wishes can be misunderstood, or not followed  Treatments may be given even if you do not want them  An advance directive may make it easier for your family to make difficult choices about your care  © Copyright [employer] 2018 Information is for End User's use only and may not be sold, redistributed or otherwise used for commercial purposes  All illustrations and images included in CareNotes® are the copyrighted property of [employer]  or [facility]        Dr Amelie Claude, MD  [facility]       "This note has been constructed using a voice recognition system  Therefore there may be syntax, spelling, and/or grammatical errors   Please call if you have any questions  "

## 2021-03-19 NOTE — PROGRESS NOTES
[facility] Office Visit Note  21     Ronak Cardenas 67 y o  male MRN: [id number]  : 1945    Assessment:     No problem-specific Assessment & Plan notes found for this encounter  There are no diagnoses linked to this encounter  Discussion Summary and Plan: Today's care plan and medications were reviewed with patient in detail and all their questions answered to their satisfaction  Chief Complaint   Patient presents with    Hypertension    CKD III      Subjective: The the the patient was seen by cardiologist     Irregular heartbeat:  Patient underwent stress test which was unremarkable  Of metoprolol, seen by cardiologist    Remain Vitamin d and basa daily    Hyperlipidemia , on pravastatin now we will continue to monitor labs including lipid and lft      cardiac murmur he he did have a previous history of mild valvular disease Rec to check with cardiologist about echocardiogram     Previous echocardiogram from 2019 May wear reviewed  Normal ejection fraction moderate L a otherwise unremarkable  New echocardiogram awaited  Holter monitor awaited  PACs and PVCs    He is not feeling much palpitation except 1 or 2 time he had a couple of minor stressful situation and did feel that  Abnormal EKG with ST flattening again being worked up with stress test and echocardiogram     CKD level 2 3 will monitor  Anemia is not a major issue  Seen by Nephrologist and cardiologist , their notes noted  No visits with results within 2 Week(s) from this visit  Latest known visit with results is:  Appointment on 2021  Sodium                    Value: 140(mmol/L)        Dt: 2021  Potassium                 Value: 4 8(mmol/L)        Dt: 2021  Chloride                  Value: 109(mmol/L)*       Dt: 2021  CO2                       Value: 29(mmol/L)         Dt: 2021  ANION GAP                 Value:  2(mmol/L)*         Dt: 2021  BUN Value: 19(mg/dL)          Dt: 01/20/2021  Creatinine                Value: 1 47(mg/dL)*       Dt: 01/20/2021  Glucose, Fasting          Value: 87(mg/dL)          Dt: 01/20/2021  Calcium                   Value: 9 4(mg/dL)         Dt: 01/20/2021  eGFR                      Value: 46(ml/min/1 73sq m) Dt: 01/20/2021  ------------ - 2 weeks      Hospital Outpatient Visit on 09/14/2020  Protocol Name             Value: JUAN JOSE              Dt: 09/14/2020  Time In Exercise Phase    Value: 00:06:30           Dt: 09/14/2020  MAX   SYSTOLIC BP          Value: 160(mmHg)          Dt: 52/79/5269  Max Diastolic Bp          Value: 80(mmHg)           Dt: 09/14/2020  Max Heart Rate            Value: 169(BPM)           Dt: 09/14/2020  Max Predicted Heart Rate  Value: 145(BPM)           Dt: 09/14/2020  Reason for Termination                              Dt: 09/14/2020                  Value: Target Heart Rate Achieved   Test Indication                                     Dt: 09/14/2020                  Value: Abnormal ECG   Target Hr Formular                                  Dt: 09/14/2020                  Value: (220 - Age)*100%   Chest Pain Statement      Value: none               Dt: 09/14/2020  ------------  Appointment on 09/11/2020  WBC                       Value: 5 33(Thousand/uL)  Dt: 09/11/2020  RBC                       Value: 4 78(Million/uL)   Dt: 09/11/2020  Hemoglobin                Value: 14 3(g/dL)         Dt: 09/11/2020  Hematocrit                Value: 43 7(%)            Dt: 09/11/2020  MCV                       Value: 91(fL)             Dt: 09/11/2020  MCH                       Value: 29 9(pg)           Dt: 09/11/2020  MCHC                      Value: 32 7(g/dL)         Dt: 09/11/2020  RDW                       Value: 12 8(%)            Dt: 09/11/2020  Platelets                 Value: 146(Thousands/uL)* Dt: 09/11/2020  MPV                       Value: 10 5(fL)           Dt: 09/11/2020  Lyme IgG/IgM Ab           Value: <0 91(ISR)         Dt: 09/11/2020  Sed Rate                  Value: 3(mm/hour)         Dt: 09/11/2020  TWAN                       Value: Negative           Dt: 09/11/2020  Rheumatoid Factor         Value: Negative           Dt: 09/11/2020  ------------  Appointment on 08/22/2020  WBC                       Value: 7 25(Thousand/uL)  Dt: 08/22/2020  RBC                       Value: 4 57(Million/uL)   Dt: 08/22/2020  Hemoglobin                Value: 13 9(g/dL)         Dt: 08/22/2020  Hematocrit                Value: 41 9(%)            Dt: 08/22/2020  MCV                       Value: 92(fL)             Dt: 08/22/2020  MCH                       Value: 30 4(pg)           Dt: 08/22/2020  MCHC                      Value: 33 2(g/dL)         Dt: 08/22/2020  RDW                       Value: 12 6(%)            Dt: 08/22/2020  Platelets                 Value: 195(Thousands/uL)  Dt: 08/22/2020  MPV                       Value: 10 0(fL)           Dt: 08/22/2020  Cholesterol               Value: 177(mg/dL)         Dt: 08/22/2020  Triglycerides             Value: 91(mg/dL)          Dt: 08/22/2020  HDL, Direct               Value: 45(mg/dL)          Dt: 08/22/2020  LDL Calculated            Value: 114(mg/dL)*        Dt: 08/22/2020  Non-HDL-Chol (CHOL-HDL)   Value: 132(mg/dl)         Dt: 08/22/2020  Sodium                    Value: 137(mmol/L)        Dt: 08/22/2020  Potassium                 Value: 4 4(mmol/L)        Dt: 08/22/2020  Chloride                  Value: 103(mmol/L)        Dt: 08/22/2020  CO2                       Value: 27(mmol/L)         Dt: 08/22/2020  ANION GAP                 Value:  7(mmol/L)          Dt: 08/22/2020  BUN                       Value: 22(mg/dL)          Dt: 08/22/2020  Creatinine                Value: 1 31(mg/dL)*       Dt: 08/22/2020  Glucose, Fasting          Value: 93(mg/dL)          Dt: 08/22/2020  Calcium                   Value: 8 4(mg/dL)         Dt: 08/22/2020  AST                       Value: 22(U/L)            Dt: 08/22/2020  ALT                       Value: 21(U/L)            Dt: 08/22/2020  Alkaline Phosphatase      Value: 78(U/L)            Dt: 08/22/2020  Total Protein             Value: 7 1(g/dL)          Dt: 08/22/2020  Albumin                   Value: 3 7(g/dL)          Dt: 08/22/2020  Total Bilirubin           Value: 0 70(mg/dL)        Dt: 08/22/2020  eGFR                      Value: 53(ml/min/1 73sq m) Dt: 08/22/2020  TSH 3RD GENERATON         Value: 2 708(uIU/mL)      Dt: 08/22/2020  Free T4                   Value: 1 02(ng/dL)        Dt: 08/22/2020  Creatinine, Ur            Value: 119  0(mg/dL)       Dt: 08/22/2020  Microalbum  ,U,Random      Value: <5 0(mg/L)         Dt: 08/22/2020  Microalb Creat Ratio      Value: <4(mg/g creatinine) Dt: 08/22/2020  Phosphorus                Value: 2 8(mg/dL)         Dt: 08/22/2020  PTH                       Value: 47  1(pg/mL)        Dt: 08/22/2020  Magnesium                 Value: 2 1(mg/dL)         Dt: 08/22/2020  Vit D, 25-Hydroxy         Value: 47  6(ng/mL)        Dt: 08/22/2020  ------------ - 6 moths labs            9-: stress test:JUAN JOSE PROTOCOL:  HR bpm SBP mmHg DBP mmHg Symptoms Rhythm/conduct  Baseline 105 122 74 none sinus tach  Stage 1 144 140 70 none same as above  Stage 2 160 152 74 none --  Stage 3 164 -- -- moderate fatigue same as above  Immediate 155 160 80 subsiding --  Recovery 1 116 140 70 none occasional PVC's  Recovery 2 99 130 76 none --  No medications or fluids given      STRESS SUMMARY: Duration of exercise was 6 min and 30 sec  The patient exercised to protocol stage 3  Maximal work rate was 8 5 METs  Maximal heart rate during stress was 169 bpm ( 117 % of maximal predicted heart rate)  The heart rate  response to stress was normal  There was normal resting blood pressure with an appropriate response to stress  The rate-pressure product for the peak heart rate and blood pressure was 59240   There was no chest pain during stress  The  stress test was terminated due to achievement of target heart rate and moderate fatigue      pre sao2= 99%  peak sao2= 99% The stress ECG was negative for ischemia  There were no stress arrhythmias or conduction abnormalities      SUMMARY:  -  Stress results: Duration of exercise was 6 min and 30 sec  Target heart rate was achieved  There was no chest pain during stress  -  ECG conclusions: The stress ECG was negative for ischemia      IMPRESSIONS: Normal study after maximal exercise without reproduction of symptoms  The following portions of the patient's history were reviewed and updated as appropriate: allergies, current medications, past family history, past medical history, past social history, past surgical history and problem list     Review of Systems   Constitutional: Negative for chills, fatigue, fever and unexpected weight change  HENT: Negative  Respiratory: Negative for cough and shortness of breath  Cardiovascular: Negative for chest pain, palpitations and leg swelling  Gastrointestinal: Negative for abdominal pain, diarrhea and nausea  Endocrine: Negative for cold intolerance, polydipsia and polyuria  Genitourinary: Negative for dysuria, frequency and urgency  Musculoskeletal: Negative for arthralgias, gait problem and myalgias  Neurological: Negative for dizziness and headaches  Hematological: Negative  Psychiatric/Behavioral: Negative  Historical Information   Patient Active Problem List   Diagnosis    CKD (chronic kidney disease), stage III    Nephrolithiasis    Anemia    Heart burn    BPH (benign prostatic hyperplasia)    Shin esophagus    Hypercholesteremia    Irregular heart beat    Elevated blood pressure reading    Abnormal EKG    Platelets decreased ([address] )     Past Medical History:   Diagnosis Date    BPH (benign prostatic hyperplasia)     GERD (gastroesophageal reflux disease)     Herpes      History reviewed   No pertinent surgical history  Social History     Substance and Sexual Activity   Alcohol Use Yes    Alcohol/week: 1 0 - 2 0 standard drinks    Types: 1 - 2 Glasses of wine per week    Comment: occas     Social History     Substance and Sexual Activity   Drug Use No     Social History     Tobacco Use   Smoking Status Never Smoker   Smokeless Tobacco Never Used     Family History   Problem Relation Age of Onset    No Known Problems Mother     No Known Problems Father      Health Maintenance Due   Topic    Medicare Annual Wellness Visit (AWV)     DTaP,Tdap,and Td Vaccines (1 - Tdap)    Fall Risk     Pneumococcal Vaccine: 65+ Years (1 of 1 - PPSV23)    Depression Screening PHQ       Meds/Allergies       Current Outpatient Medications:     aspirin (ECOTRIN LOW STRENGTH) 81 mg EC tablet, Take 81 mg by mouth daily, Disp: , Rfl:     cholecalciferol (VITAMIN D3) 1,000 units tablet, Take 1,000 Units by mouth daily, Disp: , Rfl:     famotidine (PEPCID) 40 MG tablet, Take 1 tablet (40 mg total) by mouth daily, Disp: 90 tablet, Rfl: 1    pravastatin (PRAVACHOL) 10 mg tablet, Take 1 tablet (10 mg total) by mouth every other day, Disp: 45 tablet, Rfl: 3    tadalafil (CIALIS) 20 MG tablet, Take 1 tablet (20 mg total) by mouth daily as needed for erectile dysfunction, Disp: 10 tablet, Rfl: 2    tamsulosin (Flomax) 0 4 mg, Take 1 capsule (0 4 mg total) by mouth daily with dinner, Disp: 90 capsule, Rfl: 1    vitamin B-12 (VITAMIN B-12) 1,000 mcg tablet, Take 1,000 mcg by mouth daily, Disp: , Rfl:       Objective:    Vitals: There were no vitals taken for this visit  There is no height or weight on file to calculate BMI  There were no vitals filed for this visit  Physical Exam  Vitals signs and nursing note reviewed  Constitutional:       Appearance: He is well-developed  HENT:      Head: Normocephalic and atraumatic     Eyes:      Conjunctiva/sclera: Conjunctivae normal    Neck:      Musculoskeletal: No muscular tenderness  Thyroid: No thyromegaly  Vascular: No carotid bruit or JVD  Cardiovascular:      Rate and Rhythm: Regular rhythm  Heart sounds: Normal heart sounds  Pulmonary:      Effort: No respiratory distress  Breath sounds: Normal breath sounds  No wheezing or rales  Abdominal:      General: Bowel sounds are normal  There is no distension  Palpations: There is no mass  Tenderness: There is no abdominal tenderness  There is no rebound  Musculoskeletal:      Right lower leg: No edema  Left lower leg: No edema  Lymphadenopathy:      Cervical: No cervical adenopathy  Skin:     General: Skin is warm  Findings: No rash  Neurological:      General: No focal deficit present  Psychiatric:         Mood and Affect: Mood normal          Behavior: Behavior normal          Thought Content: Thought content normal          Judgment: Judgment normal          Lab Review   Appointment on 01/20/2021   Component Date Value Ref Range Status    Sodium 01/20/2021 140  136 - 145 mmol/L Final    Potassium 01/20/2021 4 8  3 5 - 5 3 mmol/L Final    Chloride 01/20/2021 109* 100 - 108 mmol/L Final    CO2 01/20/2021 29  21 - 32 mmol/L Final    ANION GAP 01/20/2021 2* 4 - 13 mmol/L Final    BUN 01/20/2021 19  5 - 25 mg/dL Final    Creatinine 01/20/2021 1 47* 0 60 - 1 30 mg/dL Final    Standardized to IDMS reference method    Glucose, Fasting 01/20/2021 87  65 - 99 mg/dL Final    Specimen collection should occur prior to Sulfasalazine administration due to the potential for falsely depressed results  Specimen collection should occur prior to Sulfapyridine administration due to the potential for falsely elevated results   Calcium 01/20/2021 9 4  8 3 - 10 1 mg/dL Final    eGFR 01/20/2021 46  ml/min/1 73sq m Final         There are no Patient Instructions on file for this visit       Dr Kim Hudson MD  [facility]       "This note has been constructed using a voice recognition system  Therefore there may be syntax, spelling, and/or grammatical errors   Please call if you have any questions  "

## 2021-05-27 ENCOUNTER — OFFICE VISIT (OUTPATIENT)
Dept: URGENT CARE | Facility: CLINIC | Age: 76
End: 2021-05-27
Payer: MEDICARE

## 2021-05-27 VITALS
OXYGEN SATURATION: 97 % | TEMPERATURE: 97.4 F | RESPIRATION RATE: 18 BRPM | HEART RATE: 89 BPM | WEIGHT: 149.6 LBS | DIASTOLIC BLOOD PRESSURE: 84 MMHG | HEIGHT: 70 IN | BODY MASS INDEX: 21.42 KG/M2 | SYSTOLIC BLOOD PRESSURE: 148 MMHG

## 2021-05-27 DIAGNOSIS — S01.511A LACERATION OF LIP WITHOUT COMPLICATION, INITIAL ENCOUNTER: Primary | ICD-10-CM

## 2021-05-27 PROCEDURE — 12020 TX SUPFC WND DEHSN SMPL CLSR: CPT | Performed by: PHYSICIAN ASSISTANT

## 2021-05-27 PROCEDURE — 90715 TDAP VACCINE 7 YRS/> IM: CPT

## 2021-05-27 PROCEDURE — 99204 OFFICE O/P NEW MOD 45 MIN: CPT | Performed by: PHYSICIAN ASSISTANT

## 2021-05-27 PROCEDURE — 90471 IMMUNIZATION ADMIN: CPT

## 2021-05-27 PROCEDURE — 12011 RPR F/E/E/N/L/M 2.5 CM/<: CPT | Performed by: PHYSICIAN ASSISTANT

## 2021-05-27 NOTE — PATIENT INSTRUCTIONS
Lip laceration repaired today with non-dissolving sutures  Keep the bandage on for the next 24 hours and keep wound dry  After 24 hours can apply vaseline and leave open to the air  To clean just allow warm soapy water to run over laceration  Do not scrub the area   Follow up in 5-7 days for suture removal

## 2021-06-02 ENCOUNTER — OFFICE VISIT (OUTPATIENT)
Dept: URGENT CARE | Facility: CLINIC | Age: 76
End: 2021-06-02
Payer: MEDICARE

## 2021-06-02 VITALS
WEIGHT: 152 LBS | HEART RATE: 82 BPM | BODY MASS INDEX: 21.81 KG/M2 | OXYGEN SATURATION: 97 % | TEMPERATURE: 98.4 F | RESPIRATION RATE: 16 BRPM

## 2021-06-02 DIAGNOSIS — Z48.02 ENCOUNTER FOR REMOVAL OF SUTURES: ICD-10-CM

## 2021-06-02 DIAGNOSIS — S01.511D: Primary | ICD-10-CM

## 2021-06-02 PROCEDURE — 99024 POSTOP FOLLOW-UP VISIT: CPT | Performed by: PHYSICIAN ASSISTANT

## 2021-06-02 NOTE — PATIENT INSTRUCTIONS
Suture removed with out complication  Make sure to apply sun screen to the area daily to prevent darkening of any scar  Follow up with primary care for any further complications

## 2021-06-02 NOTE — PROGRESS NOTES
[facility]        NAME: Tabatha Jernigan is a 67 y o  male  : 1945    MRN: [id number]  DATE: 2021  TIME: 9:31 PM    Assessment and Plan   Laceration of lip without complication, initial encounter [S01 511A]  1  Laceration of lip without complication, initial encounter  TDAP Vaccine greater than or equal to 6yo         Patient Instructions     Patient Instructions   Lip laceration repaired today with non-dissolving sutures  Keep the bandage on for the next 24 hours and keep wound dry  After 24 hours can apply vaseline and leave open to the air  To clean just allow warm soapy water to run over laceration  Do not scrub the area  Follow up in 5-7 days for suture removal          Follow up with PCP in 3-5 days  Proceed to  ER if symptoms worsen  Chief Complaint     Chief Complaint   Patient presents with    Laceration      fell on rocks while hiking  received a laceration 3/4 in over top lip  history of TDAP unknown          History of Present Illness       69-year-old male presents for a caught on his upper lip that happened just prior to arrival   Patient notes he was out hiking and tripped and hit his face on a giant rock  He applied pressure and came directly to be treated  He notes he did not knock any teeth lose and did not hit his head  There is no headache, dizziness, nausea, vomiting or blurred vision  He is unsure of his last tetanus  Review of Systems   Review of Systems   HENT: Positive for facial swelling  Negative for dental problem  Gastrointestinal: Negative for nausea and vomiting  Skin: Positive for wound  Neurological: Negative for dizziness, light-headedness and headaches           Current Medications       Current Outpatient Medications:     aspirin (ECOTRIN LOW STRENGTH) 81 mg EC tablet, Take 81 mg by mouth daily, Disp: , Rfl:     cholecalciferol (VITAMIN D3) 1,000 units tablet, Take 1,000 Units by mouth daily, Disp: , Rfl:     famotidine (PEPCID) 40 MG tablet, Take 1 tablet (40 mg total) by mouth daily, Disp: 90 tablet, Rfl: 1    pravastatin (PRAVACHOL) 10 mg tablet, Take 1 tablet (10 mg total) by mouth every other day, Disp: 45 tablet, Rfl: 3    tamsulosin (Flomax) 0 4 mg, Take 1 capsule (0 4 mg total) by mouth daily with dinner, Disp: 90 capsule, Rfl: 1    vitamin B-12 (VITAMIN B-12) 1,000 mcg tablet, Take 1,000 mcg by mouth daily, Disp: , Rfl:     tadalafil (CIALIS) 20 MG tablet, Take 1 tablet (20 mg total) by mouth daily as needed for erectile dysfunction (Patient not taking: Reported on 5/27/2021), Disp: 10 tablet, Rfl: 2    Current Allergies     Allergies as of 05/27/2021    (No Known Allergies)            The following portions of the patient's history were reviewed and updated as appropriate: allergies, current medications, past family history, past medical history, past social history, past surgical history and problem list      Past Medical History:   Diagnosis Date    BPH (benign prostatic hyperplasia)     GERD (gastroesophageal reflux disease)     Herpes        No past surgical history on file  Family History   Problem Relation Age of Onset    No Known Problems Mother     No Known Problems Father          Medications have been verified  Objective   /84   Pulse 89   Temp (!) 97 4 °F (36 3 °C)   Resp 18   Ht 5' 10" (1 778 m)   Wt 67 9 kg (149 lb 9 6 oz)   SpO2 97%   BMI 21 47 kg/m²          Physical Exam     Physical Exam  Vitals signs and nursing note reviewed  Constitutional:       Appearance: Normal appearance  HENT:      Head: Normocephalic  Nose: Nose normal       Mouth/Throat:      Mouth: Lacerations present  Dentition: No dental tenderness  Comments: 4mm laceration on upper lip, does not penetrate to inner lip or frenulum  Mild swelling  No ecchymosis  Eyes:      Extraocular Movements: Extraocular movements intact  Pupils: Pupils are equal, round, and reactive to light  Cardiovascular:      Rate and Rhythm: Normal rate and regular rhythm  Pulses: Normal pulses  Heart sounds: Normal heart sounds  Pulmonary:      Effort: Pulmonary effort is normal       Breath sounds: Normal breath sounds  Neurological:      General: No focal deficit present  Mental Status: He is alert and oriented to person, place, and time  Cranial Nerves: No cranial nerve deficit  Motor: No weakness  Laceration repair    Date/Time: 5/27/2021 5:00 PM  Performed by: Colletta Lolling, PA-C  Authorized by: Colletta Lolling, PA-C   Consent: Verbal consent obtained    Consent given by: patient  Patient identity confirmed: verbally with patient  Body area: head/neck  Location details: upper lip  Full thickness lip laceration: no  Vermilion border involved: no  Laceration length: 0 4 cm  Tendon involvement: none  Nerve involvement: none  Anesthesia: local infiltration    Anesthesia:  Local Anesthetic: lidocaine 1% with epinephrine  Anesthetic total: 2 mL    Sedation:  Patient sedated: no      Wound Dehiscence:  Superficial Wound Dehiscence: simple closure      Procedure Details:  Irrigation solution: saline  Irrigation method: syringe  Debridement: none  Skin closure: 6-0 nylon  Number of sutures: 5  Technique: running  Approximation: close  Approximation difficulty: simple  Dressing: 4x4 sterile gauze  Patient tolerance: patient tolerated the procedure well with no immediate complications

## 2021-06-02 NOTE — PROGRESS NOTES
[facility]        NAME: Reny Collier is a 67 y o  male  : 1945    MRN: [id number]  DATE: 2021  TIME: 6:30 PM    Assessment and Plan   Laceration of lip without complication, subsequent encounter [S01 511D]  1  Laceration of lip without complication, subsequent encounter     2  Encounter for removal of sutures           Patient Instructions     Patient Instructions   Suture removed with out complication  Make sure to apply sun screen to the area daily to prevent darkening of any scar  Follow up with primary care for any further complications  Follow up with PCP in 3-5 days  Proceed to  ER if symptoms worsen  Chief Complaint     Chief Complaint   Patient presents with    Suture / Staple Removal     pt presents with need for suture removal post 6 day onset         History of Present Illness       66 y/o male presents for a suture removal for a laceration that occurred 6 days ago  Pt had the laceration repaired at this clinic by this provider  He has been keeping the area clean and dry  No drainage, pain, redness or warmth around it, fever, chills, or body aches  Review of Systems   Review of Systems   Constitutional: Negative for chills and fever  Respiratory: Negative for cough  Cardiovascular: Negative for chest pain  Skin: Positive for wound  Neurological: Negative for headaches  Hematological: Does not bruise/bleed easily           Current Medications       Current Outpatient Medications:     aspirin (ECOTRIN LOW STRENGTH) 81 mg EC tablet, Take 81 mg by mouth daily, Disp: , Rfl:     cholecalciferol (VITAMIN D3) 1,000 units tablet, Take 1,000 Units by mouth daily, Disp: , Rfl:     famotidine (PEPCID) 40 MG tablet, Take 1 tablet (40 mg total) by mouth daily, Disp: 90 tablet, Rfl: 1    pravastatin (PRAVACHOL) 10 mg tablet, Take 1 tablet (10 mg total) by mouth every other day, Disp: 45 tablet, Rfl: 3    tadalafil (CIALIS) 20 MG tablet, Take 1 tablet (20 mg total) by mouth daily as needed for erectile dysfunction, Disp: 10 tablet, Rfl: 2    tamsulosin (Flomax) 0 4 mg, Take 1 capsule (0 4 mg total) by mouth daily with dinner, Disp: 90 capsule, Rfl: 1    vitamin B-12 (VITAMIN B-12) 1,000 mcg tablet, Take 1,000 mcg by mouth daily, Disp: , Rfl:     Current Allergies     Allergies as of 06/02/2021    (No Known Allergies)            The following portions of the patient's history were reviewed and updated as appropriate: allergies, current medications, past family history, past medical history, past social history, past surgical history and problem list      Past Medical History:   Diagnosis Date    BPH (benign prostatic hyperplasia)     GERD (gastroesophageal reflux disease)     Herpes        History reviewed  No pertinent surgical history  Family History   Problem Relation Age of Onset    No Known Problems Mother     No Known Problems Father          Medications have been verified  Objective   Pulse 82   Temp 98 4 °F (36 9 °C)   Resp 16   Wt 68 9 kg (152 lb)   SpO2 97%   BMI 21 81 kg/m²          Physical Exam     Physical Exam  Vitals signs and nursing note reviewed  Constitutional:       Appearance: Normal appearance  HENT:      Head: Normocephalic  Mouth/Throat:      Mouth: Lacerations present  Comments: Well healed laceration with running suture in place  No separation noted  No signs of infection  Neurological:      Mental Status: He is alert  Suture removal    Date/Time: 6/2/2021 6:29 PM  Performed by: Ly Marmolejo PA-C  Authorized by: Ly Marmolejo PA-C   Universal Protocol:  Procedure performed by:  Patient identity confirmed: verbally with patient        Patient location:  Clinic  Location:     Location:  Mouth    Mouth location:  Upper exterior lip  Procedure details:      Tools used:  Suture removal kit    Wound appearance:  No sign(s) of infection, good wound healing and nontender    Number of sutures removed:  5  Post-procedure details:     Patient tolerance of procedure:   Tolerated well, no immediate complications

## 2021-06-14 ENCOUNTER — APPOINTMENT (OUTPATIENT)
Dept: LAB | Facility: CLINIC | Age: 76
End: 2021-06-14
Payer: MEDICARE

## 2021-06-14 DIAGNOSIS — E78.00 HYPERCHOLESTEREMIA: ICD-10-CM

## 2021-06-14 DIAGNOSIS — Z11.59 ENCOUNTER FOR HEPATITIS C SCREENING TEST FOR LOW RISK PATIENT: ICD-10-CM

## 2021-06-14 DIAGNOSIS — N18.31 STAGE 3A CHRONIC KIDNEY DISEASE (HCC): ICD-10-CM

## 2021-06-14 LAB
ALBUMIN SERPL BCP-MCNC: 3.7 G/DL (ref 3.5–5)
ALP SERPL-CCNC: 95 U/L (ref 46–116)
ALT SERPL W P-5'-P-CCNC: 20 U/L (ref 12–78)
ANION GAP SERPL CALCULATED.3IONS-SCNC: 7 MMOL/L (ref 4–13)
AST SERPL W P-5'-P-CCNC: 18 U/L (ref 5–45)
BILIRUB SERPL-MCNC: 0.72 MG/DL (ref 0.2–1)
BUN SERPL-MCNC: 22 MG/DL (ref 5–25)
CALCIUM SERPL-MCNC: 9.3 MG/DL (ref 8.3–10.1)
CHLORIDE SERPL-SCNC: 108 MMOL/L (ref 100–108)
CHOLEST SERPL-MCNC: 167 MG/DL (ref 50–200)
CO2 SERPL-SCNC: 26 MMOL/L (ref 21–32)
CREAT SERPL-MCNC: 1.32 MG/DL (ref 0.6–1.3)
GFR SERPL CREATININE-BSD FRML MDRD: 52 ML/MIN/1.73SQ M
GLUCOSE P FAST SERPL-MCNC: 91 MG/DL (ref 65–99)
HCV AB SER QL: NORMAL
HDLC SERPL-MCNC: 56 MG/DL
LDLC SERPL CALC-MCNC: 100 MG/DL (ref 0–100)
NONHDLC SERPL-MCNC: 111 MG/DL
POTASSIUM SERPL-SCNC: 4.6 MMOL/L (ref 3.5–5.3)
PROT SERPL-MCNC: 7 G/DL (ref 6.4–8.2)
SODIUM SERPL-SCNC: 141 MMOL/L (ref 136–145)
TRIGL SERPL-MCNC: 56 MG/DL

## 2021-06-14 PROCEDURE — 36415 COLL VENOUS BLD VENIPUNCTURE: CPT

## 2021-06-14 PROCEDURE — 80053 COMPREHEN METABOLIC PANEL: CPT

## 2021-06-14 PROCEDURE — 86803 HEPATITIS C AB TEST: CPT

## 2021-06-14 PROCEDURE — 80061 LIPID PANEL: CPT

## 2021-06-18 ENCOUNTER — OFFICE VISIT (OUTPATIENT)
Dept: INTERNAL MEDICINE CLINIC | Facility: CLINIC | Age: 76
End: 2021-06-18
Payer: MEDICARE

## 2021-06-18 ENCOUNTER — APPOINTMENT (OUTPATIENT)
Dept: LAB | Facility: CLINIC | Age: 76
End: 2021-06-18
Payer: MEDICARE

## 2021-06-18 VITALS
WEIGHT: 152 LBS | HEIGHT: 70 IN | TEMPERATURE: 97.3 F | DIASTOLIC BLOOD PRESSURE: 79 MMHG | OXYGEN SATURATION: 98 % | BODY MASS INDEX: 21.76 KG/M2 | HEART RATE: 62 BPM | SYSTOLIC BLOOD PRESSURE: 124 MMHG

## 2021-06-18 DIAGNOSIS — K22.70 BARRETT'S ESOPHAGUS WITHOUT DYSPLASIA: ICD-10-CM

## 2021-06-18 DIAGNOSIS — N40.0 BENIGN PROSTATIC HYPERPLASIA, UNSPECIFIED WHETHER LOWER URINARY TRACT SYMPTOMS PRESENT: ICD-10-CM

## 2021-06-18 DIAGNOSIS — E78.00 HYPERCHOLESTEREMIA: ICD-10-CM

## 2021-06-18 DIAGNOSIS — R21 RASH: ICD-10-CM
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
## 2021-06-18 DIAGNOSIS — N18.31 STAGE 3A CHRONIC KIDNEY DISEASE (HCC): ICD-10-CM

## 2021-06-18 DIAGNOSIS — N18.30 ANEMIA DUE TO STAGE 3 CHRONIC KIDNEY DISEASE, UNSPECIFIED WHETHER STAGE 3A OR 3B CKD (HCC): ICD-10-CM

## 2021-06-18 DIAGNOSIS — N20.0 NEPHROLITHIASIS: ICD-10-CM

## 2021-06-18 DIAGNOSIS — D63.1 ANEMIA DUE TO STAGE 3 CHRONIC KIDNEY DISEASE, UNSPECIFIED WHETHER STAGE 3A OR 3B CKD (HCC): ICD-10-CM

## 2021-06-18 DIAGNOSIS — D69.6 PLATELETS DECREASED (HCC): ICD-10-CM
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
## 2021-06-18 LAB
ERYTHROCYTE [DISTWIDTH] IN BLOOD BY AUTOMATED COUNT: 12.7 % (ref 11.6–15.1)
HCT VFR BLD AUTO: 40.8 % (ref 36.5–49.3)
HGB BLD-MCNC: 13.6 G/DL (ref 12–17)
MCH RBC QN AUTO: 30.6 PG (ref 26.8–34.3)
MCHC RBC AUTO-ENTMCNC: 33.3 G/DL (ref 31.4–37.4)
MCV RBC AUTO: 92 FL (ref 82–98)
PLATELET # BLD AUTO: 202 THOUSANDS/UL (ref 149–390)
PMV BLD AUTO: 10.9 FL (ref 8.9–12.7)
RBC # BLD AUTO: 4.45 MILLION/UL (ref 3.88–5.62)
WBC # BLD AUTO: 7.48 THOUSAND/UL (ref 4.31–10.16)

## 2021-06-18 PROCEDURE — 85027 COMPLETE CBC AUTOMATED: CPT

## 2021-06-18 PROCEDURE — 99214 OFFICE O/P EST MOD 30 MIN: CPT | Performed by: INTERNAL MEDICINE

## 2021-06-18 PROCEDURE — 36415 COLL VENOUS BLD VENIPUNCTURE: CPT

## 2021-06-18 PROCEDURE — 86618 LYME DISEASE ANTIBODY: CPT

## 2021-06-18 RX ORDER — DOXYCYCLINE HYCLATE 100 MG
100 TABLET ORAL 2 TIMES DAILY
Qty: 28 TABLET | Refills: 0 | Status: SHIPPED | OUTPATIENT
Start: 2021-06-18 | End: 2021-07-02

## 2021-06-18 RX ORDER — TAMSULOSIN HYDROCHLORIDE 0.4 MG/1
0.4 CAPSULE ORAL
Qty: 90 CAPSULE | Refills: 1 | Status: SHIPPED | OUTPATIENT
Start: 2021-06-18 | End: 2022-02-08

## 2021-06-18 NOTE — PROGRESS NOTES
[facility] Office Visit Note  21     Monalisa Osborne Reger 67 y o  male MRN: [id number]  : 1945    Assessment:     Fall  2021, incidental while hiking, patient sustained laceration on the lip required suturing  Hypercholesteremia  LDL is to the target  ALT is normal   Will continue pravastatin 10 mg daily    Anemia  History of anemia and low platelet  Will do CBC next time  He is doing clinically well    CKD (chronic kidney disease), stage III  Lab Results   Component Value Date    EGFR 52 2021    EGFR 46 2021    EGFR 45 2020    CREATININE 1 32 (H) 2021    CREATININE 1 47 (H) 2021    CREATININE 1 49 (H) 2020   GFR improved  Generally doing well  Creatinine 1 32  Home will continue to follow    Nephrolithiasis  Symptom-free from kidney stone standpoint    BPH (benign prostatic hyperplasia)  No major obstructive symptoms from the prostate  Continue Flomax 0 4 mg daily  He will continue to follow with urologist    He had seen urologist prior to Raciel  Recommend to follow up periodically the a is sometime later part of this year    Shin esophagus  Shin's esophagus is stable  Will continue Pepcid 40 mg daily  For surveillance EGD per gastroenterologist    Rash  Patient appears to have a classic bull's-eye type of 18 x 16 cm rest flat with central clearing except small central rash read is and peripheral margin read is  No history of tick by that he remembers  It appears very classic that of a bull's eye  Will do Lyme titer  Will treat with doxycycline 100 mg twice a day  Recommend no sun exposure while he is on doxycycline  Diagnoses and all orders for this visit:    Fall, sequela    Benign prostatic hyperplasia, unspecified whether lower urinary tract symptoms present  -     tamsulosin (Flomax) 0 4 mg;  Take 1 capsule (0 4 mg total) by mouth daily with dinner    Platelets decreased (HCC)    Hypercholesteremia    Anemia due to stage 3 chronic kidney disease, unspecified whether stage 3a or 3b CKD ([facility] [address] )    Stage 3a chronic kidney disease ([facility] [address] )    Nephrolithiasis    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    Rash  -     CBC; Future  -     Lyme Antibody Profile with reflex to WB; Future  -     doxycycline hyclate (VIBRA-TABS) 100 mg tablet; Take 1 tablet (100 mg total) by mouth 2 (two) times a day for 14 days          Discussion Summary and Plan: Today's care plan and medications were reviewed with patient in detail and all their questions answered to their satisfaction  Rash most likely Lyme type of rest   Will treat like that doxycycline 100 mg twice a day for 2 weeks avoid XX x-ray exposure get Lyme titer today  CKD level 3 baseline but somewhat improved GFR  Anemia will monitor hemoglobin and platelet today along with Lyme titer  BPH fair  Hyperlipidemia well controlled  Recent fall no injury except for laceration which is healed at this time  Continue Flomax  Erectile dysfunction on Cialis and he tolerated Cialis and Flomax without any side effect  A Shin's esophagus remains on Pepcid upper GI endoscopy probably more than 2 years ago advised him to check with gastroenterologist   Continue same at this time  Chief Complaint   Patient presents with   [address]     noticed bulleye today    Hyperlipidemia    CKD III    Abnormal Lab    Follow-up     History of anemia, low platelet, recent history of fall, renal stone,      Subjective:  Patient is here for chronic disease management  However patient has a new complain of bull's-eye type of rash 18 x 16 cm in the right upper chest     Patient noticed this lesion with classic appearance of pinkish red margin outside with central keep clearing and as such central area of pinkish red a 18 x 16 cm he noticed it today  He does not remember being bitten by the take  He denies any fever chills arthralgia myalgia headache body ache the lymphadenopathy      Recent incident about fall were reviewed  Of 05/27/2021 patient was hiking sustained a fall went to the urgent care head laceration on on the lips 5 stitches were taken and subsequently removed  He did not have any neck pain low back pain or hip pain he did not have any headache dizziness loss of consciousness nausea or vomiting      He did receive tetanus injection       Irregular heartbeat: No palpitation, dizziness, no cardiac complains, pt cut down on chocolate and caffiene  Patient underwent stress test which was unremarkable  OfF metoprolol, seen by cardiologist    Vitamin-D deficiency remains on vitamin-D supplement    Hyperlipidemia , on pravastatin now we will continue to monitor labs including lipid and lft      cardiac murmur he he did have a previous history of mild valvular disease Rec to check with cardiologist about echocardiogram     Previous echocardiogram from 2019 May wear reviewed  Normal ejection fraction moderate L a otherwise unremarkable  New echocardiogram awaited  Holter monitor awaited  PACs and PVCs    He is not feeling much palpitation except 1 or 2 time he had a couple of minor stressful situation and did feel that  Abnormal EKG with ST flattening again being worked up with stress test and echocardiogram     CKD level 2 3 will monitor  Anemia is not a major issue  A Cologuard 06/11/2021 Negative    06/14/2021 cholesterol 167 triglyceride 58 , creatinine 1 32 BUN 22 GFR 52,  12/21/2020 cholesterol 173 triglyceride 87  creatinine 1 47 rest of the BMP normal GFR 46  08/22/2020 cholesterol 177  creatinine 1 49 GFR 49 rest of the CMP normal 2 1, vitamin-D 47 6, TH 47, magnesium  02/26/2020 cholesterol 193  HDL 55 creatinine 1 31 rest of the CMP normal GFR 53    01/20/2021:  BUN 19 creatinine 1 47 GFR 46 rest of the BMP normal  September 2020:  CBC was normal with hemoglobin 14 3                9-: stress test:JUAN JOSE PROTOCOL:  ECG conclusions:  The stress ECG was negative for ischemia      IMPRESSIONS: Normal study after maximal exercise without reproduction of symptoms  The following portions of the patient's history were reviewed and updated as appropriate: allergies, current medications, past family history, past medical history, past social history, past surgical history and problem list     Review of Systems   Constitutional: Negative for chills, fatigue, fever and unexpected weight change  HENT: Negative  Respiratory: Negative for cough and shortness of breath  Cardiovascular: Negative for chest pain, palpitations and leg swelling  Gastrointestinal: Negative for abdominal pain, diarrhea and nausea  Endocrine: Negative for cold intolerance, polydipsia and polyuria  Genitourinary: Negative for dysuria, frequency and urgency  Musculoskeletal: Negative for arthralgias, gait problem and myalgias  Skin: Positive for rash  Neurological: Negative for dizziness and headaches  Hematological: Negative  Psychiatric/Behavioral: Negative  Historical Information   Patient Active Problem List   Diagnosis    CKD (chronic kidney disease), stage III (HCC)    Nephrolithiasis    Anemia    Heart burn    BPH (benign prostatic hyperplasia)    Shin esophagus    Hypercholesteremia    Irregular heart beat    Elevated blood pressure reading    Abnormal EKG    Rash    Platelets decreased ([address] )    Fall     Past Medical History:   Diagnosis Date    BPH (benign prostatic hyperplasia)     GERD (gastroesophageal reflux disease)     Herpes      History reviewed  No pertinent surgical history    Social History     Substance and Sexual Activity   Alcohol Use Yes    Alcohol/week: 1 0 - 2 0 standard drinks    Types: 1 - 2 Glasses of wine per week    Comment: occas     Social History     Substance and Sexual Activity   Drug Use No     Social History     Tobacco Use   Smoking Status Never Smoker   Smokeless Tobacco Never Used Family History   Problem Relation Age of Onset    No Known Problems Mother     No Known Problems Father      Health Maintenance Due   Topic    Pneumococcal Vaccine: 65+ Years (1 of 1 - PPSV23)      Meds/Allergies       Current Outpatient Medications:     aspirin (ECOTRIN LOW STRENGTH) 81 mg EC tablet, Take 81 mg by mouth daily, Disp: , Rfl:     cholecalciferol (VITAMIN D3) 1,000 units tablet, Take 1,000 Units by mouth daily, Disp: , Rfl:     famotidine (PEPCID) 40 MG tablet, Take 1 tablet (40 mg total) by mouth daily, Disp: 90 tablet, Rfl: 1    pravastatin (PRAVACHOL) 10 mg tablet, Take 1 tablet (10 mg total) by mouth every other day, Disp: 45 tablet, Rfl: 3    tadalafil (CIALIS) 20 MG tablet, Take 1 tablet (20 mg total) by mouth daily as needed for erectile dysfunction, Disp: 10 tablet, Rfl: 2    tamsulosin (Flomax) 0 4 mg, Take 1 capsule (0 4 mg total) by mouth daily with dinner, Disp: 90 capsule, Rfl: 1    vitamin B-12 (VITAMIN B-12) 1,000 mcg tablet, Take 1,000 mcg by mouth daily, Disp: , Rfl:     doxycycline hyclate (VIBRA-TABS) 100 mg tablet, Take 1 tablet (100 mg total) by mouth 2 (two) times a day for 14 days, Disp: 28 tablet, Rfl: 0      Objective:    Vitals:   /79   Pulse 62   Temp (!) 97 3 °F (36 3 °C)   Ht 5' 10" (1 778 m)   Wt 68 9 kg (152 lb)   SpO2 98%   BMI 21 81 kg/m²   Body mass index is 21 81 kg/m²  Vitals:    06/18/21 1053   Weight: 68 9 kg (152 lb)       Physical Exam  Constitutional:       General: He is not in acute distress  Appearance: He is well-developed  He is not ill-appearing, toxic-appearing or diaphoretic  HENT:      Head: Normocephalic and atraumatic  Right Ear: External ear normal       Left Ear: External ear normal    Eyes:      General:         Right eye: No discharge  Left eye: No discharge  Conjunctiva/sclera: Conjunctivae normal    Neck:      Thyroid: No thyroid mass or thyromegaly  Vascular: No carotid bruit or JVD  Cardiovascular:      Rate and Rhythm: Regular rhythm  Heart sounds: Normal heart sounds  Pulmonary:      Effort: No respiratory distress  Breath sounds: Normal breath sounds  No wheezing or rales  Abdominal:      General: Bowel sounds are normal  There is no distension  Palpations: There is no mass  Tenderness: There is no rebound  Musculoskeletal:      Right lower leg: No edema  Left lower leg: No edema  Lymphadenopathy:      Cervical: No cervical adenopathy  Upper Body:      Right upper body: No supraclavicular, axillary, pectoral or epitrochlear adenopathy  Left upper body: No supraclavicular or axillary adenopathy  Skin:     General: Skin is warm  Findings: Erythema present  No rash  Comments: 18 x 16 cm a rash ball side type with peripheral reddened area with somewhat central clearing and central rate is part spot  No regional lymphadenopathy   Neurological:      General: No focal deficit present  Mental Status: Mental status is at baseline  Coordination: Coordination normal    Psychiatric:         Mood and Affect: Mood normal          Behavior: Behavior normal          Thought Content: Thought content normal          Judgment: Judgment normal          Lab Review   Appointment on 06/14/2021   Component Date Value Ref Range Status    Hepatitis C Ab 06/14/2021 Non-reactive  Non-reactive Final    Cholesterol 06/14/2021 167  50 - 200 mg/dL Final    Cholesterol:       Desirable         <200 mg/dl       Borderline         200-239 mg/dl       High              >239           Triglycerides 06/14/2021 56  <=150 mg/dL Final    Triglyceride:     Normal          <150 mg/dl     Borderline High 150-199 mg/dl     High            200-499 mg/dl        Very High       >499 mg/dl    Specimen collection should occur prior to N-Acetylcysteine or Metamizole administration due to the potential for falsely depressed results      HDL, Direct 06/14/2021 56 >=40 mg/dL Final    HDL Cholesterol:       Low     <41 mg/dL  Specimen collection should occur prior to Metamizole administration due to the potential for falsley depressed results   LDL Calculated 06/14/2021 100  0 - 100 mg/dL Final    LDL Cholesterol:     Optimal           <100 mg/dl     Near Optimal      100-129 mg/dl     Above Optimal       Borderline High 130-159 mg/dl       High            160-189 mg/dl       Very High       >189 mg/dl         This screening LDL is a calculated result  It does not have the accuracy of the Direct Measured LDL in the monitoring of patients with hyperlipidemia and/or statin therapy  Direct Measure LDL (OPC581) must be ordered separately in these patients   Non-HDL-Chol (CHOL-HDL) 06/14/2021 111  mg/dl Final    Sodium 06/14/2021 141  136 - 145 mmol/L Final    Potassium 06/14/2021 4 6  3 5 - 5 3 mmol/L Final    Chloride 06/14/2021 108  100 - 108 mmol/L Final    CO2 06/14/2021 26  21 - 32 mmol/L Final    ANION GAP 06/14/2021 7  4 - 13 mmol/L Final    BUN 06/14/2021 22  5 - 25 mg/dL Final    Creatinine 06/14/2021 1 32* 0 60 - 1 30 mg/dL Final    Standardized to IDMS reference method    Glucose, Fasting 06/14/2021 91  65 - 99 mg/dL Final    Specimen collection should occur prior to Sulfasalazine administration due to the potential for falsely depressed results  Specimen collection should occur prior to Sulfapyridine administration due to the potential for falsely elevated results   Calcium 06/14/2021 9 3  8 3 - 10 1 mg/dL Final    AST 06/14/2021 18  5 - 45 U/L Final    Specimen collection should occur prior to Sulfasalazine administration due to the potential for falsely depressed results   ALT 06/14/2021 20  12 - 78 U/L Final    Specimen collection should occur prior to Sulfasalazine and/or Sulfapyridine administration due to the potential for falsely depressed results       Alkaline Phosphatase 06/14/2021 95  46 - 116 U/L Final    Total Protein 06/14/2021 7 0  6 4 - 8 2 g/dL Final    Albumin 06/14/2021 3 7  3 5 - 5 0 g/dL Final    Total Bilirubin 06/14/2021 0 72  0 20 - 1 00 mg/dL Final    Use of this assay is not recommended for patients undergoing treatment with eltrombopag due to the potential for falsely elevated results   eGFR 06/14/2021 52  ml/min/1 73sq m Final         Patient Instructions   Kindly call gastroenterologist and ask them if you are due for your upper GI endoscopy for surveillance of Shin's  Follow with Consultants as per their and our suggestion    Follow up in 2  week(s) or as needed earlier    Follow all instructions as advised and discussed  Take your medications as prescribed  Call the office immediately if you experience any side effects  Ask questions if you do not understand  Keep your scheduled appointment as advised or come sooner if necessary or in doubt  Best time to call for non-urgent matter or questions on weekdays is between 9am and 12 noon  See physician for any new symptoms or worsening of current symptoms  Urgent or emergent situations call 911 and report to nearest emergency room  I spent  30 minutes taking care of this patient including clinical care, conseling, collaboration, chart, lab and consultaion review  Patient is to get labs today         Dr Edward Abarca MD  [facility]       "This note has been constructed using a voice recognition system  Therefore there may be syntax, spelling, and/or grammatical errors   Please call if you have any questions  "

## 2021-06-18 NOTE — ASSESSMENT & PLAN NOTE
No major obstructive symptoms from the prostate  Continue Flomax 0 4 mg daily  He will continue to follow with urologist    He had seen urologist prior to Raciel    Recommend to follow up periodically the a is sometime later part of this year

## 2021-06-18 NOTE — ASSESSMENT & PLAN NOTE
Shin's esophagus is stable  Will continue Pepcid 40 mg daily    For surveillance EGD per gastroenterologist

## 2021-06-18 NOTE — ASSESSMENT & PLAN NOTE
Lab Results   Component Value Date    EGFR 52 06/14/2021    EGFR 46 01/20/2021    EGFR 45 12/21/2020    CREATININE 1 32 (H) 06/14/2021    CREATININE 1 47 (H) 01/20/2021    CREATININE 1 49 (H) 12/21/2020   GFR improved  Generally doing well  Creatinine 1 32    Home will continue to follow

## 2021-06-18 NOTE — PATIENT INSTRUCTIONS
Kindly call gastroenterologist and ask them if you are due for your upper GI endoscopy for surveillance of Shin's  Follow with Consultants as per their and our suggestion    Follow up in 2  week(s) or as needed earlier    Follow all instructions as advised and discussed  Take your medications as prescribed  Call the office immediately if you experience any side effects  Ask questions if you do not understand  Keep your scheduled appointment as advised or come sooner if necessary or in doubt  Best time to call for non-urgent matter or questions on weekdays is between 9am and 12 noon  See physician for any new symptoms or worsening of current symptoms  Urgent or emergent situations call 911 and report to nearest emergency room  I spent  30 minutes taking care of this patient including clinical care, conseling, collaboration, chart, lab and consultaion review      Patient is to get labs today

## 2021-06-18 NOTE — ASSESSMENT & PLAN NOTE
Patient appears to have a classic bull's-eye type of 18 x 16 cm rest flat with central clearing except small central rash read is and peripheral margin read is  No history of tick by that he remembers  It appears very classic that of a bull's eye  Will do Lyme titer  Will treat with doxycycline 100 mg twice a day  Recommend no sun exposure while he is on doxycycline

## 2021-06-19 LAB — B BURGDOR IGG+IGM SER-ACNC: 91

## 2021-06-23 ENCOUNTER — TELEPHONE (OUTPATIENT)
Dept: OTHER | Facility: HOSPITAL | Age: 76
End: 2021-06-23

## 2021-07-06 ENCOUNTER — OFFICE VISIT (OUTPATIENT)
Dept: INTERNAL MEDICINE CLINIC | Facility: CLINIC | Age: 76
End: 2021-07-06
Payer: MEDICARE

## 2021-07-06 VITALS
TEMPERATURE: 97.2 F | HEART RATE: 60 BPM | BODY MASS INDEX: 21.05 KG/M2 | SYSTOLIC BLOOD PRESSURE: 120 MMHG | OXYGEN SATURATION: 97 % | HEIGHT: 70 IN | DIASTOLIC BLOOD PRESSURE: 68 MMHG | WEIGHT: 147 LBS

## 2021-07-06 DIAGNOSIS — R21 RASH: Primary | ICD-10-CM

## 2021-07-06 DIAGNOSIS — D63.1 ANEMIA DUE TO STAGE 3 CHRONIC KIDNEY DISEASE, UNSPECIFIED WHETHER STAGE 3A OR 3B CKD (HCC): ICD-10-CM

## 2021-07-06 DIAGNOSIS — N18.31 STAGE 3A CHRONIC KIDNEY DISEASE (HCC): ICD-10-CM

## 2021-07-06 DIAGNOSIS — N18.30 ANEMIA DUE TO STAGE 3 CHRONIC KIDNEY DISEASE, UNSPECIFIED WHETHER STAGE 3A OR 3B CKD (HCC): ICD-10-CM

## 2021-07-06 PROCEDURE — 99213 OFFICE O/P EST LOW 20 MIN: CPT | Performed by: INTERNAL MEDICINE

## 2021-07-06 NOTE — PROGRESS NOTES
[facility] Office Visit Note  21     Navid Cardenas 67 y o  male MRN: [id number]  : 1945    Assessment:     No problem-specific Assessment & Plan notes found for this encounter  There are no diagnoses linked to this encounter  Discussion Summary and Plan: Today's care plan and medications were reviewed with patient in detail and all their questions answered to their satisfaction  In summary rash resolved  No other finding of Lyme disease  Lyme titer was negative  Finished antibiotic without side effect  CKD level 3 patient was given a basic metabolic profile to be done in the beginning of September by nephrologist we will review at the next visit  Hyperlipidemia continue pravastatin  Anemia resolved hemoglobin essentially normal 13 6  Follow back in 3 months with no additional blood test on my side  Chief Complaint   Patient presents with    Follow-up     No new complaints      Subjective:  Patient is here for chronic disease management  However patient has a new complain of bull's-eye type of rash 18 x 16 cm in the right upper chest     Patient noticed this lesion with classic appearance of pinkish red margin outside with central keep clearing and as such central area of pinkish red a 18 x 16 cm he noticed it today  He does not remember being bitten by the take  He denies any fever chills arthralgia myalgia headache body ache the lymphadenopathy  Recent incident about fall were reviewed  Of 2021 patient was hiking sustained a fall went to the urgent care head laceration on on the lips 5 stitches were taken and subsequently removed    He did not have any neck pain low back pain or hip pain he did not have any headache dizziness loss of consciousness nausea or vomiting      He did receive tetanus injection       Irregular heartbeat: No palpitation, dizziness, no cardiac complains, pt cut down on chocolate and caffiene  Patient underwent stress test which was unremarkable  OfF metoprolol, seen by cardiologist    Vitamin-D deficiency remains on vitamin-D supplement    Hyperlipidemia , on pravastatin now we will continue to monitor labs including lipid and lft      cardiac murmur he he did have a previous history of mild valvular disease Rec to check with cardiologist about echocardiogram     Previous echocardiogram from 2019 May wear reviewed  Normal ejection fraction moderate L a otherwise unremarkable  New echocardiogram awaited  Holter monitor awaited  PACs and PVCs    He is not feeling much palpitation except 1 or 2 time he had a couple of minor stressful situation and did feel that  Abnormal EKG with ST flattening again being worked up with stress test and echocardiogram     CKD level 2 3 will monitor  Anemia is not a major issue  A Cologuard 06/11/2021 Negative    06/14/2021 cholesterol 167 triglyceride 58 , creatinine 1 32 BUN 22 GFR 52,  12/21/2020 cholesterol 173 triglyceride 87  creatinine 1 47 rest of the BMP normal GFR 46  08/22/2020 cholesterol 177  creatinine 1 49 GFR 49 rest of the CMP normal 2 1, vitamin-D 47 6, TH 47, magnesium  02/26/2020 cholesterol 193  HDL 55 creatinine 1 31 rest of the CMP normal GFR 53  01/20/2021:  BUN 19 creatinine 1 47 GFR 46 rest of the BMP normal  September 2020:  CBC was normal with hemoglobin 14 3      9-: stress test:JUAN JOSE PROTOCOL:  ECG conclusions: The stress ECG was negative for ischemia      IMPRESSIONS: Normal study after maximal exercise without reproduction of symptoms  No visits with results within 2 Week(s) from this visit    Latest known visit with results is:  Appointment on 06/18/2021  WBC                       Value: 7 48(Thousand/uL)  Dt: 06/18/2021  RBC                       Value: 4 45(Million/uL)   Dt: 06/18/2021  Hemoglobin                Value: 13 6(g/dL)         Dt: 06/18/2021  Hematocrit                Value: 40 8(%)            Dt: 06/18/2021  MCV Value: 92(fL)             Dt: 06/18/2021  MCH                       Value: 30 6(pg)           Dt: 06/18/2021  MCHC                      Value: 33 3(g/dL)         Dt: 06/18/2021  RDW                       Value: 12 7(%)            Dt: 06/18/2021  Platelets                 Value: 202(Thousands/uL)  Dt: 06/18/2021  MPV                       Value: 10 9(fL)           Dt: 06/18/2021  Lyme total antibody       Value: 91                 Dt: 06/18/2021  ------------ - 2 weeks          The following portions of the patient's history were reviewed and updated as appropriate: allergies, current medications, past family history, past medical history, past social history, past surgical history and problem list     Review of Systems   All other systems reviewed and are negative  Historical Information   Patient Active Problem List   Diagnosis    CKD (chronic kidney disease), stage III (HCC)    Nephrolithiasis    Anemia    Heart burn    BPH (benign prostatic hyperplasia)    Shin esophagus    Hypercholesteremia    Irregular heart beat    Elevated blood pressure reading    Abnormal EKG    Rash    Platelets decreased ([address] )    Fall     Past Medical History:   Diagnosis Date    BPH (benign prostatic hyperplasia)     GERD (gastroesophageal reflux disease)     Herpes      History reviewed  No pertinent surgical history    Social History     Substance and Sexual Activity   Alcohol Use Yes    Alcohol/week: 1 0 - 2 0 standard drinks    Types: 1 - 2 Glasses of wine per week    Comment: occas     Social History     Substance and Sexual Activity   Drug Use No     Social History     Tobacco Use   Smoking Status Never Smoker   Smokeless Tobacco Never Used     Family History   Problem Relation Age of Onset    No Known Problems Mother     No Known Problems Father      Health Maintenance Due   Topic    Pneumococcal Vaccine: 65+ Years (1 of 1 - PPSV23)    Influenza Vaccine (1)      Meds/Allergies Current Outpatient Medications:     aspirin (ECOTRIN LOW STRENGTH) 81 mg EC tablet, Take 81 mg by mouth daily, Disp: , Rfl:     cholecalciferol (VITAMIN D3) 1,000 units tablet, Take 1,000 Units by mouth daily, Disp: , Rfl:     famotidine (PEPCID) 40 MG tablet, Take 1 tablet (40 mg total) by mouth daily, Disp: 90 tablet, Rfl: 1    pravastatin (PRAVACHOL) 10 mg tablet, Take 1 tablet (10 mg total) by mouth every other day, Disp: 45 tablet, Rfl: 3    tadalafil (CIALIS) 20 MG tablet, Take 1 tablet (20 mg total) by mouth daily as needed for erectile dysfunction, Disp: 10 tablet, Rfl: 2    tamsulosin (Flomax) 0 4 mg, Take 1 capsule (0 4 mg total) by mouth daily with dinner, Disp: 90 capsule, Rfl: 1    vitamin B-12 (VITAMIN B-12) 1,000 mcg tablet, Take 1,000 mcg by mouth daily, Disp: , Rfl:       Objective:    Vitals:   Pulse 60   Temp (!) 97 2 °F (36 2 °C)   Ht 5' 10" (1 778 m)   Wt 66 7 kg (147 lb)   SpO2 97%   BMI 21 09 kg/m²   Body mass index is 21 09 kg/m²  Vitals:    07/06/21 0857   Weight: 66 7 kg (147 lb)       Physical Exam  Constitutional:       Appearance: He is well-developed  HENT:      Head: Normocephalic and atraumatic  Eyes:      Conjunctiva/sclera: Conjunctivae normal    Neck:      Thyroid: No thyromegaly  Vascular: No JVD  Cardiovascular:      Rate and Rhythm: Regular rhythm  Heart sounds: Normal heart sounds  Pulmonary:      Effort: No respiratory distress  Breath sounds: Normal breath sounds  No wheezing or rales  Lymphadenopathy:      Cervical: No cervical adenopathy  Skin:     Findings: No lesion or rash           Lab Review   Appointment on 06/18/2021   Component Date Value Ref Range Status    WBC 06/18/2021 7 48  4 31 - 10 16 Thousand/uL Final    RBC 06/18/2021 4 45  3 88 - 5 62 Million/uL Final    Hemoglobin 06/18/2021 13 6  12 0 - 17 0 g/dL Final    Hematocrit 06/18/2021 40 8  36 5 - 49 3 % Final    MCV 06/18/2021 92  82 - 98 fL Final   [facility] ([facility]) 06/18/2021 30 6  26 8 - 34 3 pg Final    MCHC 06/18/2021 33 3  31 4 - 37 4 g/dL Final    RDW 06/18/2021 12 7  11 6 - 15 1 % Final    Platelets 05/36/1108 202  149 - 390 Thousands/uL Final    MPV 06/18/2021 10 9  8 9 - 12 7 fL Final    Lyme total antibody 06/18/2021 91  0 00 - 119 Final    Negative (0-119) Absence of detectable Borrelia burgdoferi Antibodies  A negative result does not exclude the possibility of Borrelia infection  If early Lyme disease is suspected,a second sample should be collected & tested 4 weeks after initial testing  Appointment on 06/14/2021   Component Date Value Ref Range Status    Hepatitis C Ab 06/14/2021 Non-reactive  Non-reactive Final    Cholesterol 06/14/2021 167  50 - 200 mg/dL Final    Cholesterol:       Desirable         <200 mg/dl       Borderline         200-239 mg/dl       High              >239           Triglycerides 06/14/2021 56  <=150 mg/dL Final    Triglyceride:     Normal          <150 mg/dl     Borderline High 150-199 mg/dl     High            200-499 mg/dl        Very High       >499 mg/dl    Specimen collection should occur prior to N-Acetylcysteine or Metamizole administration due to the potential for falsely depressed results   HDL, Direct 06/14/2021 56  >=40 mg/dL Final    HDL Cholesterol:       Low     <41 mg/dL  Specimen collection should occur prior to Metamizole administration due to the potential for falsley depressed results   LDL Calculated 06/14/2021 100  0 - 100 mg/dL Final    LDL Cholesterol:     Optimal           <100 mg/dl     Near Optimal      100-129 mg/dl     Above Optimal       Borderline High 130-159 mg/dl       High            160-189 mg/dl       Very High       >189 mg/dl         This screening LDL is a calculated result  It does not have the accuracy of the Direct Measured LDL in the monitoring of patients with hyperlipidemia and/or statin therapy  Direct Measure LDL (BLZ662) must be ordered separately in these patients      Non-HDL-Chol (CHOL-HDL) 06/14/2021 111  mg/dl Final    Sodium 06/14/2021 141  136 - 145 mmol/L Final    Potassium 06/14/2021 4 6  3 5 - 5 3 mmol/L Final    Chloride 06/14/2021 108  100 - 108 mmol/L Final    CO2 06/14/2021 26  21 - 32 mmol/L Final    ANION GAP 06/14/2021 7  4 - 13 mmol/L Final    BUN 06/14/2021 22  5 - 25 mg/dL Final    Creatinine 06/14/2021 1 32* 0 60 - 1 30 mg/dL Final    Standardized to IDMS reference method    Glucose, Fasting 06/14/2021 91  65 - 99 mg/dL Final    Specimen collection should occur prior to Sulfasalazine administration due to the potential for falsely depressed results  Specimen collection should occur prior to Sulfapyridine administration due to the potential for falsely elevated results   Calcium 06/14/2021 9 3  8 3 - 10 1 mg/dL Final    AST 06/14/2021 18  5 - 45 U/L Final    Specimen collection should occur prior to Sulfasalazine administration due to the potential for falsely depressed results   ALT 06/14/2021 20  12 - 78 U/L Final    Specimen collection should occur prior to Sulfasalazine and/or Sulfapyridine administration due to the potential for falsely depressed results   Alkaline Phosphatase 06/14/2021 95  46 - 116 U/L Final    Total Protein 06/14/2021 7 0  6 4 - 8 2 g/dL Final    Albumin 06/14/2021 3 7  3 5 - 5 0 g/dL Final    Total Bilirubin 06/14/2021 0 72  0 20 - 1 00 mg/dL Final    Use of this assay is not recommended for patients undergoing treatment with eltrombopag due to the potential for falsely elevated results   eGFR 06/14/2021 52  ml/min/1 73sq m Final         There are no Patient Instructions on file for this visit  Dr Pankaj Fu MD  [facility]       "This note has been constructed using a voice recognition system  Therefore there may be syntax, spelling, and/or grammatical errors   Please call if you have any questions  "

## 2021-07-06 NOTE — ASSESSMENT & PLAN NOTE
Lab Results   Component Value Date    EGFR 52 06/14/2021    EGFR 46 01/20/2021    EGFR 45 12/21/2020    CREATININE 1 32 (H) 06/14/2021    CREATININE 1 47 (H) 01/20/2021    CREATININE 1 49 (H) 12/21/2020   Will do surveillance BMP in 3 months

## 2021-10-05 ENCOUNTER — CLINICAL SUPPORT (OUTPATIENT)
Dept: INTERNAL MEDICINE CLINIC | Facility: CLINIC | Age: 76
End: 2021-10-05
Payer: MEDICARE

## 2021-10-05 ENCOUNTER — APPOINTMENT (OUTPATIENT)
Dept: LAB | Facility: CLINIC | Age: 76
End: 2021-10-05
Payer: MEDICARE

## 2021-10-05 DIAGNOSIS — E55.9 VITAMIN D INSUFFICIENCY: ICD-10-CM

## 2021-10-05 DIAGNOSIS — N18.31 STAGE 3A CHRONIC KIDNEY DISEASE (HCC): ICD-10-CM

## 2021-10-05 DIAGNOSIS — N18.31 STAGE 3A CHRONIC KIDNEY DISEASE (HCC): Primary | ICD-10-CM

## 2021-10-05 DIAGNOSIS — Z23 ENCOUNTER FOR IMMUNIZATION: Primary | ICD-10-CM

## 2021-10-05 LAB
25(OH)D3 SERPL-MCNC: 45 NG/ML (ref 30–100)
ANION GAP SERPL CALCULATED.3IONS-SCNC: 5 MMOL/L (ref 4–13)
BACTERIA UR QL AUTO: NORMAL /HPF
BILIRUB UR QL STRIP: NEGATIVE
BUN SERPL-MCNC: 25 MG/DL (ref 5–25)
CALCIUM SERPL-MCNC: 9.7 MG/DL (ref 8.3–10.1)
CHLORIDE SERPL-SCNC: 109 MMOL/L (ref 100–108)
CLARITY UR: CLEAR
CO2 SERPL-SCNC: 26 MMOL/L (ref 21–32)
COLOR UR: YELLOW
CREAT SERPL-MCNC: 1.28 MG/DL (ref 0.6–1.3)
CREAT UR-MCNC: 164 MG/DL
GFR SERPL CREATININE-BSD FRML MDRD: 54 ML/MIN/1.73SQ M
GLUCOSE P FAST SERPL-MCNC: 84 MG/DL (ref 65–99)
GLUCOSE UR STRIP-MCNC: NEGATIVE MG/DL
HGB UR QL STRIP.AUTO: NEGATIVE
HYALINE CASTS #/AREA URNS LPF: NORMAL /LPF
KETONES UR STRIP-MCNC: NEGATIVE MG/DL
LEUKOCYTE ESTERASE UR QL STRIP: NEGATIVE
MAGNESIUM SERPL-MCNC: 2.3 MG/DL (ref 1.6–2.6)
MICROALBUMIN UR-MCNC: 7.9 MG/L (ref 0–20)
MICROALBUMIN/CREAT 24H UR: 5 MG/G CREATININE (ref 0–30)
NITRITE UR QL STRIP: NEGATIVE
NON-SQ EPI CELLS URNS QL MICRO: NORMAL /HPF
PH UR STRIP.AUTO: 6 [PH]
PHOSPHATE SERPL-MCNC: 3 MG/DL (ref 2.3–4.1)
POTASSIUM SERPL-SCNC: 4.5 MMOL/L (ref 3.5–5.3)
PROT UR STRIP-MCNC: NEGATIVE MG/DL
PTH-INTACT SERPL-MCNC: 46.9 PG/ML (ref 18.4–80.1)
RBC #/AREA URNS AUTO: NORMAL /HPF
SODIUM SERPL-SCNC: 140 MMOL/L (ref 136–145)
SP GR UR STRIP.AUTO: 1.02 (ref 1–1.03)
UROBILINOGEN UR QL STRIP.AUTO: 0.2 E.U./DL
WBC #/AREA URNS AUTO: NORMAL /HPF

## 2021-10-05 PROCEDURE — 83970 ASSAY OF PARATHORMONE: CPT

## 2021-10-05 PROCEDURE — 82043 UR ALBUMIN QUANTITATIVE: CPT

## 2021-10-05 PROCEDURE — 80048 BASIC METABOLIC PNL TOTAL CA: CPT

## 2021-10-05 PROCEDURE — G0008 ADMIN INFLUENZA VIRUS VAC: HCPCS | Performed by: INTERNAL MEDICINE

## 2021-10-05 PROCEDURE — 90662 IIV NO PRSV INCREASED AG IM: CPT | Performed by: INTERNAL MEDICINE

## 2021-10-05 PROCEDURE — 84100 ASSAY OF PHOSPHORUS: CPT

## 2021-10-05 PROCEDURE — 82306 VITAMIN D 25 HYDROXY: CPT

## 2021-10-05 PROCEDURE — 36415 COLL VENOUS BLD VENIPUNCTURE: CPT

## 2021-10-05 PROCEDURE — 83735 ASSAY OF MAGNESIUM: CPT

## 2021-10-05 PROCEDURE — 82570 ASSAY OF URINE CREATININE: CPT

## 2021-10-05 PROCEDURE — 81001 URINALYSIS AUTO W/SCOPE: CPT

## 2021-10-11 ENCOUNTER — OFFICE VISIT (OUTPATIENT)
Dept: INTERNAL MEDICINE CLINIC | Facility: CLINIC | Age: 76
End: 2021-10-11
Payer: MEDICARE

## 2021-10-11 ENCOUNTER — APPOINTMENT (OUTPATIENT)
Dept: LAB | Facility: CLINIC | Age: 76
End: 2021-10-11
Payer: MEDICARE

## 2021-10-11 ENCOUNTER — HOSPITAL ENCOUNTER (OUTPATIENT)
Dept: RADIOLOGY | Facility: HOSPITAL | Age: 76
Discharge: HOME | End: 2021-10-11
Payer: MEDICARE

## 2021-10-11 VITALS — WEIGHT: 150 LBS | HEART RATE: 68 BPM | BODY MASS INDEX: 21.47 KG/M2 | OXYGEN SATURATION: 99 % | HEIGHT: 70 IN

## 2021-10-11 DIAGNOSIS — M25.561 CHRONIC PAIN OF RIGHT KNEE: ICD-10-CM

## 2021-10-11 DIAGNOSIS — G89.29 CHRONIC PAIN OF RIGHT KNEE: ICD-10-CM

## 2021-10-11 DIAGNOSIS — E78.00 HYPERCHOLESTEREMIA: ICD-10-CM

## 2021-10-11 DIAGNOSIS — D63.1 ANEMIA DUE TO STAGE 3 CHRONIC KIDNEY DISEASE, UNSPECIFIED WHETHER STAGE 3A OR 3B CKD (HCC): ICD-10-CM

## 2021-10-11 DIAGNOSIS — N18.31 STAGE 3A CHRONIC KIDNEY DISEASE (HCC): Primary | ICD-10-CM

## 2021-10-11 DIAGNOSIS — N18.30 ANEMIA DUE TO STAGE 3 CHRONIC KIDNEY DISEASE, UNSPECIFIED WHETHER STAGE 3A OR 3B CKD (HCC): ICD-10-CM

## 2021-10-11 DIAGNOSIS — R12 HEART BURN: ICD-10-CM

## 2021-10-11 DIAGNOSIS — K22.70 BARRETT'S ESOPHAGUS WITHOUT DYSPLASIA: ICD-10-CM

## 2021-10-11 PROCEDURE — 99214 OFFICE O/P EST MOD 30 MIN: CPT | Performed by: INTERNAL MEDICINE

## 2021-10-11 PROCEDURE — 36415 COLL VENOUS BLD VENIPUNCTURE: CPT

## 2021-10-11 PROCEDURE — 73562 X-RAY EXAM OF KNEE 3: CPT

## 2021-10-11 PROCEDURE — 86618 LYME DISEASE ANTIBODY: CPT

## 2021-10-11 RX ORDER — FAMOTIDINE 40 MG/1
40 TABLET, FILM COATED ORAL DAILY
Qty: 90 TABLET | Refills: 1 | Status: SHIPPED | OUTPATIENT
Start: 2021-10-11 | End: 2022-03-25 | Stop reason: SDUPTHER

## 2021-10-12 LAB — B BURGDOR IGG+IGM SER-ACNC: 98

## 2021-10-18 ENCOUNTER — OFFICE VISIT (OUTPATIENT)
Dept: INTERNAL MEDICINE CLINIC | Facility: CLINIC | Age: 76
End: 2021-10-18
Payer: MEDICARE

## 2021-10-18 VITALS
BODY MASS INDEX: 21.47 KG/M2 | HEIGHT: 70 IN | WEIGHT: 150 LBS | SYSTOLIC BLOOD PRESSURE: 122 MMHG | OXYGEN SATURATION: 99 % | HEART RATE: 66 BPM | DIASTOLIC BLOOD PRESSURE: 70 MMHG

## 2021-10-18 DIAGNOSIS — M25.561 ACUTE PAIN OF RIGHT KNEE: Primary | ICD-10-CM

## 2021-10-18 PROCEDURE — 99213 OFFICE O/P EST LOW 20 MIN: CPT | Performed by: INTERNAL MEDICINE

## 2021-11-01 ENCOUNTER — OFFICE VISIT (OUTPATIENT)
Dept: NEPHROLOGY | Facility: CLINIC | Age: 76
End: 2021-11-01
Payer: MEDICARE

## 2021-11-01 VITALS
DIASTOLIC BLOOD PRESSURE: 76 MMHG | SYSTOLIC BLOOD PRESSURE: 128 MMHG | WEIGHT: 150 LBS | HEIGHT: 70 IN | BODY MASS INDEX: 21.47 KG/M2

## 2021-11-01 DIAGNOSIS — E55.9 VITAMIN D INSUFFICIENCY: ICD-10-CM

## 2021-11-01 DIAGNOSIS — N20.0 NEPHROLITHIASIS: ICD-10-CM

## 2021-11-01 DIAGNOSIS — N18.31 STAGE 3A CHRONIC KIDNEY DISEASE (HCC): Primary | ICD-10-CM

## 2021-11-01 DIAGNOSIS — N28.1 RENAL CYST: ICD-10-CM

## 2021-11-01 PROCEDURE — 99214 OFFICE O/P EST MOD 30 MIN: CPT | Performed by: INTERNAL MEDICINE

## 2021-12-17 ENCOUNTER — IMMUNIZATIONS (OUTPATIENT)
Dept: FAMILY MEDICINE CLINIC | Facility: HOSPITAL | Age: 76
End: 2021-12-17

## 2021-12-17 DIAGNOSIS — Z23 ENCOUNTER FOR IMMUNIZATION: Primary | ICD-10-CM

## 2021-12-17 PROCEDURE — 0064A COVID-19 MODERNA VACC 0.25 ML BOOSTER: CPT

## 2021-12-17 PROCEDURE — 91306 COVID-19 MODERNA VACC 0.25 ML BOOSTER: CPT

## 2021-12-29 ENCOUNTER — TELEPHONE (OUTPATIENT)
Dept: CARDIOLOGY CLINIC | Facility: CLINIC | Age: 76
End: 2021-12-29

## 2022-01-02 DIAGNOSIS — E78.00 HYPERCHOLESTEREMIA: Primary | ICD-10-CM

## 2022-01-13 ENCOUNTER — APPOINTMENT (OUTPATIENT)
Dept: LAB | Facility: CLINIC | Age: 77
End: 2022-01-13
Payer: MEDICARE

## 2022-01-13 DIAGNOSIS — E78.00 HYPERCHOLESTEREMIA: ICD-10-CM

## 2022-01-13 LAB
ALBUMIN SERPL BCP-MCNC: 4 G/DL (ref 3.5–5)
ALP SERPL-CCNC: 80 U/L (ref 46–116)
ALT SERPL W P-5'-P-CCNC: 26 U/L (ref 12–78)
AST SERPL W P-5'-P-CCNC: 23 U/L (ref 5–45)
BILIRUB DIRECT SERPL-MCNC: 0.12 MG/DL (ref 0–0.2)
BILIRUB SERPL-MCNC: 0.6 MG/DL (ref 0.2–1)
CHOLEST SERPL-MCNC: 190 MG/DL
HDLC SERPL-MCNC: 50 MG/DL
LDLC SERPL CALC-MCNC: 115 MG/DL (ref 0–100)
NONHDLC SERPL-MCNC: 140 MG/DL
PROT SERPL-MCNC: 7.7 G/DL (ref 6.4–8.2)
TRIGL SERPL-MCNC: 126 MG/DL

## 2022-01-13 PROCEDURE — 80076 HEPATIC FUNCTION PANEL: CPT

## 2022-01-13 PROCEDURE — 80061 LIPID PANEL: CPT

## 2022-01-13 PROCEDURE — 36415 COLL VENOUS BLD VENIPUNCTURE: CPT

## 2022-01-14 ENCOUNTER — TELEPHONE (OUTPATIENT)
Dept: CARDIOLOGY CLINIC | Facility: CLINIC | Age: 77
End: 2022-01-14

## 2022-01-14 DIAGNOSIS — E78.5 DYSLIPIDEMIA: ICD-10-CM

## 2022-01-14 NOTE — TELEPHONE ENCOUNTER
Spoke to patient, notified of results  Pt states he has been taking 10mg every other day  Would you like him to take it every day or increase 20mg every other day?

## 2022-01-14 NOTE — TELEPHONE ENCOUNTER
----- Message from Radha Lay MD sent at 1/14/2022  9:58 AM EST -----  Liver enzymes are acceptable cholesterol mildly elevated we may need to increase his pravastatin to 20 mg or switch him to a potent statin

## 2022-01-18 RX ORDER — PRAVASTATIN SODIUM 10 MG
10 TABLET ORAL DAILY
Qty: 90 TABLET | Refills: 3 | Status: SHIPPED | OUTPATIENT
Start: 2022-01-18

## 2022-02-08 ENCOUNTER — TELEPHONE (OUTPATIENT)
Dept: GASTROENTEROLOGY | Facility: CLINIC | Age: 77
End: 2022-02-08

## 2022-02-08 ENCOUNTER — OFFICE VISIT (OUTPATIENT)
Dept: INTERNAL MEDICINE CLINIC | Facility: CLINIC | Age: 77
End: 2022-02-08
Payer: MEDICARE

## 2022-02-08 ENCOUNTER — OFFICE VISIT (OUTPATIENT)
Dept: CARDIOLOGY CLINIC | Facility: CLINIC | Age: 77
End: 2022-02-08
Payer: MEDICARE

## 2022-02-08 VITALS
TEMPERATURE: 98.7 F | OXYGEN SATURATION: 97 % | WEIGHT: 153 LBS | BODY MASS INDEX: 21.9 KG/M2 | HEART RATE: 61 BPM | HEIGHT: 70 IN | DIASTOLIC BLOOD PRESSURE: 68 MMHG | SYSTOLIC BLOOD PRESSURE: 130 MMHG

## 2022-02-08 VITALS
HEART RATE: 70 BPM | TEMPERATURE: 97.3 F | SYSTOLIC BLOOD PRESSURE: 110 MMHG | BODY MASS INDEX: 22.66 KG/M2 | DIASTOLIC BLOOD PRESSURE: 70 MMHG | WEIGHT: 153 LBS | HEIGHT: 69 IN | OXYGEN SATURATION: 98 %

## 2022-02-08 DIAGNOSIS — N18.30 STAGE 3 CHRONIC KIDNEY DISEASE, UNSPECIFIED WHETHER STAGE 3A OR 3B CKD (HCC): ICD-10-CM

## 2022-02-08 DIAGNOSIS — E78.5 DYSLIPIDEMIA: ICD-10-CM

## 2022-02-08 DIAGNOSIS — N18.31 STAGE 3A CHRONIC KIDNEY DISEASE (HCC): Primary | ICD-10-CM

## 2022-02-08 DIAGNOSIS — I49.9 IRREGULAR HEART BEAT: ICD-10-CM

## 2022-02-08 DIAGNOSIS — E55.9 VITAMIN D INSUFFICIENCY: ICD-10-CM

## 2022-02-08 DIAGNOSIS — M25.561 ACUTE PAIN OF RIGHT KNEE: ICD-10-CM

## 2022-02-08 DIAGNOSIS — D63.1 ANEMIA DUE TO STAGE 3 CHRONIC KIDNEY DISEASE, UNSPECIFIED WHETHER STAGE 3A OR 3B CKD (HCC): ICD-10-CM

## 2022-02-08 DIAGNOSIS — N18.30 ANEMIA DUE TO STAGE 3 CHRONIC KIDNEY DISEASE, UNSPECIFIED WHETHER STAGE 3A OR 3B CKD (HCC): ICD-10-CM

## 2022-02-08 DIAGNOSIS — R94.31 ABNORMAL EKG: ICD-10-CM

## 2022-02-08 DIAGNOSIS — E78.00 HYPERCHOLESTEREMIA: ICD-10-CM

## 2022-02-08 DIAGNOSIS — N52.9 ERECTILE DYSFUNCTION, UNSPECIFIED ERECTILE DYSFUNCTION TYPE: ICD-10-CM

## 2022-02-08 DIAGNOSIS — D69.6 PLATELETS DECREASED (HCC): ICD-10-CM

## 2022-02-08 DIAGNOSIS — N40.0 BENIGN PROSTATIC HYPERPLASIA, UNSPECIFIED WHETHER LOWER URINARY TRACT SYMPTOMS PRESENT: ICD-10-CM

## 2022-02-08 DIAGNOSIS — R03.0 ELEVATED BLOOD PRESSURE READING: ICD-10-CM

## 2022-02-08 DIAGNOSIS — K22.70 BARRETT'S ESOPHAGUS WITHOUT DYSPLASIA: ICD-10-CM

## 2022-02-08 PROCEDURE — 99214 OFFICE O/P EST MOD 30 MIN: CPT | Performed by: INTERNAL MEDICINE

## 2022-02-08 PROCEDURE — 93000 ELECTROCARDIOGRAM COMPLETE: CPT | Performed by: INTERNAL MEDICINE

## 2022-02-08 RX ORDER — TADALAFIL 20 MG/1
20 TABLET ORAL DAILY PRN
Qty: 5 TABLET | Refills: 2 | Status: SHIPPED | OUTPATIENT
Start: 2022-02-08 | End: 2022-02-24

## 2022-02-08 NOTE — TELEPHONE ENCOUNTER
Recall call went out via [employer] in 3/2021 with no return calls from pt to schedule  Pt is due for a colon with Dr Zachariah Concepcion for hx of polyps  I lmom for pt to please call back to schedule a procedure with Dr Zachariah Concepcion  Will call pt again in one week if do not hear back from him

## 2022-02-08 NOTE — ASSESSMENT & PLAN NOTE
No longer on Flomax  Patient is entertaining option of Cialis 5 mg daily he will check with his insurance and pharmacist     A the BPH symptoms are present

## 2022-02-08 NOTE — PROGRESS NOTES
Personal Note - Cardiology Office   [facility]  Solis Cardenas 67 y o  male MRN: [id number]  : 1945  Unit/Bed#:  Encounter: [id number]      Assessment:     1  Irregular heart beat    2  Dyslipidemia    3  Stage 3 chronic kidney disease, unspecified whether stage 3a or 3b CKD ([facility] [address] )    4  Abnormal EKG        Discussion summary and Plan:    1  Elevated blood pressure reading with episodes of irregular heartbeat on the monitor  Most likely etiology is palpitations and anxiety  Patient's Holter monitor shows few PACs and rare PVCs  Short atrial run was noted  Blood pressure is acceptable today  Will start him on metoprolol 12 5 mg as needed if he has lot of palpitations  It will be use as a pill in the pocket strategy  Prescriptions written for the patient  He is not having significant palpitations  Will see him as needed or in 9 months    2  Elevated blood pressure reading  Current blood pressure is acceptable  3  Dyslipidemia  His risk for cardiovascular event is high he is on pravastatin 10 mg daily  Repeat labs reviewed    4  Cardiac murmur  Echo Doppler from 2019 reviewed mild valvular disease  Discussed with patient  No new changes    5  Abnormal EKG with ST flattening in inferior leads with exercise stress test shows no significant EKG abnormality  It was a normal stress test   If he has any symptoms he will call us    6  History of anemia with CKD stage 2/3  Management by good office of primary care doctor  All those issues discussed with patient at length he agrees with the plan further plan as also of these tests become available  Follow-up 9 months  Thank you for your consultation  If you have any question please call me at [phone]    Counseling :  A description of the counseling  Goals and Barriers    Patient's ability to self care: Yes  Medication side effect reviewed with patient in detail and all their questions answered to their satisfaction  Primary Care Physician: Sherre Scheuermann, MD      HPI :     Clay Ferrer is a 67y o  year old male who was referred by primary care doctor for irregular heart beat and racing of high blood pressure  Patient who monitors his blood pressure and heart rate regularly noted that his blood pressure which generally runs around 100-110 was higher and his monitor was also giving irregular heartbeat  He has history of irregular heartbeat and he felt occasionally palpitations and fast heartbeat  He was started on low-dose metoprolol which have significantly decrease his palpitations as well as irregular heartbeat on his monitor and his blood pressure has also improved  He was sent to us for further cardiac workup  He denies any new symptoms no chest pain no shortness of breath no dizziness no lightheadedness no other issues  He does have history of cardiac murmur and has an echo done and found to have mild valvular disease  There is a family history of cardiac problem as his father  suddenly at age of 80  He does not smoke he is a social drinker is very active he does lot of hiking  He has no recent surgery  He lives alone and he has 2 kids who helped him  His cholesterol profile reviewed  His risk for cardiovascular event is around 20% mostly driven by his age  He does have history of anxiety particularly the current situation of the currently bothersome lot  He has blood test done which was reviewed  2020  Above reviewed  Patient came for follow-up  He is doing well no new complaints  His stress test was normal   Holter monitor shows patient has few PACs as well as PVCs there were less than 2% of the total beats  Short atrial runs were noted  He still occasionally gets palpitations  He does lot of hiking average heart rate was 73 beats per minute    Otherwise he is doing well no nausea no vomiting no PND no orthopnea his echo has previously shown normal LV systolic function no other issues at this time  02/08/2022    Above reviewed  Patient came for follow-up he is doing well he has no new complaints  His PACs and PVC burden less than 2% short atrial runs were noted  His blood pressure is acceptable he had blood work done January 2022 which is acceptable cholesterol medicine was slightly increased  No nausea no vomiting no fever no chills today EKG shows T-wave inversions in inferior lead which is not changed from previous EKGs no other cardiovascular issues at this time  His kidney function has been stable  Review of Systems   Constitutional: Negative for activity change, chills, diaphoresis, fever and unexpected weight change  HENT: Negative for congestion  Eyes: Negative for discharge and redness  Respiratory: Negative for cough, chest tightness, shortness of breath and wheezing  Cardiovascular: Positive for palpitations  Negative for chest pain and leg swelling  Rarely   Gastrointestinal: Negative for abdominal pain, diarrhea and nausea  Endocrine: Negative  Genitourinary: Negative for decreased urine volume and urgency  Musculoskeletal: Negative  Negative for arthralgias, back pain and gait problem  Skin: Negative for rash and wound  Allergic/Immunologic: Negative  Neurological: Negative for dizziness, seizures, syncope, weakness, light-headedness and headaches  Hematological: Negative  Psychiatric/Behavioral: Negative for agitation and confusion  The patient is nervous/anxious  Historical Information   Past Medical History:   Diagnosis Date    BPH (benign prostatic hyperplasia)     GERD (gastroesophageal reflux disease)     Herpes      No past surgical history on file    Social History     Substance and Sexual Activity   Alcohol Use Yes    Alcohol/week: 1 0 - 2 0 standard drink    Types: 1 - 2 Glasses of wine per week    Comment: occas     Social History     Substance and Sexual Activity   Drug Use No     Social History     Tobacco Use   Smoking Status Never Smoker   Smokeless Tobacco Never Used     Family History:   Family History   Problem Relation Age of Onset    No Known Problems Mother     No Known Problems Father        Meds/Allergies     No Known Allergies    Current Outpatient Medications:     aspirin (ECOTRIN LOW STRENGTH) 81 mg EC tablet, Take 81 mg by mouth daily, Disp: , Rfl:     cholecalciferol (VITAMIN D3) 1,000 units tablet, Take 1,000 Units by mouth daily, Disp: , Rfl:     famotidine (PEPCID) 40 MG tablet, Take 1 tablet (40 mg total) by mouth daily, Disp: 90 tablet, Rfl: 1    pravastatin (PRAVACHOL) 10 mg tablet, Take 1 tablet (10 mg total) by mouth daily, Disp: 90 tablet, Rfl: 3    tadalafil (CIALIS) 20 MG tablet, Take 1 tablet (20 mg total) by mouth daily as needed for erectile dysfunction, Disp: 10 tablet, Rfl: 2    vitamin B-12 (VITAMIN B-12) 1,000 mcg tablet, Take 1,000 mcg by mouth daily, Disp: , Rfl:     tamsulosin (Flomax) 0 4 mg, Take 1 capsule (0 4 mg total) by mouth daily with dinner (Patient not taking: Reported on 2/8/2022 ), Disp: 90 capsule, Rfl: 1    Vitals: Blood pressure 130/68, pulse 61, temperature 98 7 °F (37 1 °C), temperature source Temporal, height 5' 10" (1 778 m), weight 69 4 kg (153 lb), SpO2 97 %  Body mass index is 21 95 kg/m²  Vitals:    02/08/22 1044   Weight: 69 4 kg (153 lb)     BP Readings from Last 3 Encounters:   02/08/22 130/68   11/01/21 128/76   10/18/21 122/70         Physical Exam  Constitutional:       General: He is not in acute distress  Appearance: He is well-developed  He is not diaphoretic  Neck:      Thyroid: No thyromegaly  Vascular: No JVD  Cardiovascular:      Rate and Rhythm: Normal rate and regular rhythm  Pulses: Normal pulses  Heart sounds: Murmur heard  Comments: S1-S2 regular with 2/6 murmur  Pulmonary:      Effort: No respiratory distress  Breath sounds: No wheezing or rales     Abdominal:      General: There is no distension  Palpations: Abdomen is soft  Tenderness: There is no abdominal tenderness  There is no rebound  Musculoskeletal:         General: No tenderness  Normal range of motion  Cervical back: Normal range of motion and neck supple  Comments: No lower extremity edema   Skin:     General: Skin is warm and dry  Neurological:      Mental Status: He is alert and oriented to person, place, and time  Psychiatric:         Behavior: Behavior normal          Judgment: Judgment normal          Diagnostic Studies Review Cardio:  Echo reviewed    Two thousand nineteen shows EF 60%, left atrium moderately dilated, no significant valvular disease other than trace MR trace AI and trace TR  Exercise stress test   Exercise stress test done 2020 shows normal exercise stress test   He exercised for about 6 minutes  Holter monitor  Holter monitor shows dominant rhythm is sinus rhythm few PACs and PVCs there were less than 2% of the total beats  Short atrial runs were noted there were nonsustained  EKG:  Normal sinus rhythm heart rate 67 beats per minute  Minimal voltage for LVH T-wave abnormality in inferior leads  Twelve lead EKG done on 2022 shows normal sinus rhythm with sinus arrhythmia heart rate 61 beats per minute LVH by voltage  T-wave inversion noted in inferior leads    No change from previous EKG  Cardiac testing:   Results for orders placed during the hospital encounter of 19   Echo complete with contrast if indicated    Narrative [address]  [address] [address]  [phone]    Transthoracic Echocardiogram  2D, M-mode, Doppler, and Color Doppler    Study date:  24-May-2019    Patient: Harini Huerta  MR number: [id number]  Account number: [de-identified]  : 1945  Age: 75 years  Gender: Male  Status: Outpatient  Location: Echo lab  Height: 70 in  Weight: 149 6 lb  BP: 110/ 70 mmHg    Indications: CVA    Diagnoses: I67 9 - Cerebrovascular disease, unspecified    Sonographer:  JELANI Mckinney  Primary Physician: Kati Davidson MD  Referring Physician: Kati Davidson MD  Group:  [facility]  Interpreting Physician:  Efren Mas MD    SUMMARY    LEFT VENTRICLE:  Systolic function was normal  Ejection fraction was estimated in the range of 55 % to 60 % to be 60 %  There were no regional wall motion abnormalities  Doppler parameters were consistent with abnormal left ventricular relaxation (grade 1 diastolic dysfunction)  LEFT ATRIUM:  The atrium was moderately dilated  RIGHT ATRIUM:  The atrium was mildly dilated  MITRAL VALVE:  There was trace regurgitation  AORTIC VALVE:  There was trace regurgitation  TRICUSPID VALVE:  There was trace regurgitation  The tricuspid jet envelope definition was inadequate for estimation of RV systolic pressure  There are no indirect findings (abnormal RV volume or geometry, altered pulmonary flow velocity profile, or leftward septal displacement) which  would suggest moderate or severe pulmonary hypertension  HISTORY: PRIOR HISTORY: CKD, BPH, GERD, Herpes    PROCEDURE: The procedure was performed in the echo lab  This was a routine study  The transthoracic approach was used  The study included complete 2D imaging, M-mode, complete spectral Doppler, and color Doppler  The heart rate was 68 bpm,  at the start of the study  Image quality was adequate  LEFT VENTRICLE: Size was normal  Systolic function was normal  Ejection fraction was estimated in the range of 55 % to 60 % to be 60 %  There were no regional wall motion abnormalities  Wall thickness was normal  No evidence of apical  thrombus  DOPPLER: Doppler parameters were consistent with abnormal left ventricular relaxation (grade 1 diastolic dysfunction)      RIGHT VENTRICLE: The size was normal  Systolic function was normal  Wall thickness was normal     LEFT ATRIUM: The atrium was moderately dilated  RIGHT ATRIUM: The atrium was mildly dilated  MITRAL VALVE: There was mild thickening  There was normal leaflet separation  DOPPLER: The transmitral velocity was within the normal range  There was no evidence for stenosis  There was trace regurgitation  AORTIC VALVE: The valve was trileaflet  Leaflets exhibited mildly increased thickness, mild calcification, and normal cuspal separation  DOPPLER: Transaortic velocity was within the normal range  There was no evidence for stenosis  There  was trace regurgitation  TRICUSPID VALVE: The valve structure was normal  There was normal leaflet separation  DOPPLER: The transtricuspid velocity was within the normal range  There was no evidence for stenosis  There was trace regurgitation  The tricuspid jet  envelope definition was inadequate for estimation of RV systolic pressure  There are no indirect findings (abnormal RV volume or geometry, altered pulmonary flow velocity profile, or leftward septal displacement) which would suggest  moderate or severe pulmonary hypertension  PULMONIC VALVE: Leaflets exhibited normal thickness, no calcification, and normal cuspal separation  DOPPLER: The transpulmonic velocity was within the normal range  There was no significant regurgitation  PERICARDIUM: There was no pericardial effusion  The pericardium was normal in appearance  AORTA: The root exhibited normal size  SYSTEMIC VEINS: IVC: The inferior vena cava was normal in size  SYSTEM MEASUREMENT TABLES    2D mode  AoR Diam 2D: 3 6 cm  LA Diam (2D): 4 5 cm  LA/Ao (2D): 1 25  FS (2D Teich): 28 2 %  IVSd (2D): 0 96 cm  LVDEV: 94 4 cmï¾³  LVESV: 42 8 cmï¾³  LVIDd(2D): 4 54 cm  LVISd (2D): 3 26 cm  LVPWd (2D): 1 cm  SV (Teich): 51 6 cmï¾³    Apical four chamber  LVEF A4C: 62 %    Unspecified Scan Mode  MV Peak A Jason: 843 mm/s  MV Peak E Jason   Mean: 747 mm/s  MVA (PHT): 3 38 cmï¾²  PHT: 65 ms  Max P mm[Hg]  V Max: 2170 mm/s  Vmax: 2370 mm/s  RA Area: 18 cmï¾²  RA Volume: 51 7 cmï¾³  TAPSE: 2 2 cm    [employer] Accredited Echocardiography Laboratory    Prepared and electronically signed by    Ritesh Emanuel MD  Signed 26-May-2019 16:36:08         Imaging:  Chest X-Ray:   No Chest XR results available for this patient  CT-scan of the chest:     No CTA results available for this patient  Lab Review   Lab Results   Component Value Date    WBC 7 48 06/18/2021    HGB 13 6 06/18/2021    HCT 40 8 06/18/2021    MCV 92 06/18/2021    RDW 12 7 06/18/2021     06/18/2021     BMP:  Lab Results   Component Value Date    SODIUM 140 10/05/2021    K 4 5 10/05/2021     (H) 10/05/2021    CO2 26 10/05/2021    BUN 25 10/05/2021    CREATININE 1 28 10/05/2021    GLUC 78 01/24/2019    GLUF 84 10/05/2021    CALCIUM 9 7 10/05/2021    EGFR 54 10/05/2021    MG 2 3 10/05/2021     LFT:  Lab Results   Component Value Date    AST 23 01/13/2022    ALT 26 01/13/2022    ALKPHOS 80 01/13/2022    TP 7 7 01/13/2022    ALB 4 0 01/13/2022      Lab Results   Component Value Date    JQN4UTLJGLDX 2 708 08/22/2020     Lab Results   Component Value Date    HGBA1C 6 0 06/16/2016     Lipid Profile:   Lab Results   Component Value Date    CHOLESTEROL 190 01/13/2022    HDL 50 01/13/2022    LDLCALC 115 (H) 01/13/2022    TRIG 126 01/13/2022     Lab Results   Component Value Date    CHOLESTEROL 190 01/13/2022    CHOLESTEROL 167 06/14/2021     Lab Results   Component Value Date    CKTOTAL 130 04/29/2019    CKMB 2 7 06/23/2016    CKMBINDEX 1 3 06/23/2016           Dr Cindi Hartmann MD [facility]      "This note has been constructed using a voice recognition system  Therefore there may be syntax, spelling, and/or grammatical errors   Please call if you have any questions  "

## 2022-02-08 NOTE — ASSESSMENT & PLAN NOTE
Due for endoscopy  Symptom-free from acid reflux symptoms as well as Shin's symptoms  Remains on famotidine 40 mg without side effect

## 2022-02-08 NOTE — ASSESSMENT & PLAN NOTE
September 2020:  Holter monitor:  1  48 hour Holter monitor shows average heart rate 73 beats per minute  Short nonsustained atrial runs noted during sleep hours  Frequent PACs and PVCs noted there were about 2 percent of the total beat combined  No evidence of any sustained tachy-brielle arrhythmias noted  Patient mention no significant symptoms during awake hours  2  Rhythm was sinus throughout monitoring period  His not having any palpitations  He is no longer on metoprolol  He was seen by cardiologist   Their notes were reviewed

## 2022-02-08 NOTE — ASSESSMENT & PLAN NOTE
Lab Results   Component Value Date    EGFR 54 10/05/2021    EGFR 52 06/14/2021    EGFR 46 01/20/2021    CREATININE 1 28 10/05/2021    CREATININE 1 32 (H) 06/14/2021    CREATININE 1 47 (H) 01/20/2021   GFR is baseline  Creat is baseline  Recommend periodic blood test for monitoring of BUN and Creat  Avoid Non Steroidal Antiinflammatory such as ibuprofen, aleve etc   Potassium, HCO3 and calcium are wnl and will require periodic blood test   Bone and Mineral disease monitoring as appropriate    All patient with GFR less than 30( CKD 4 or 5 or 6) advised to follow with nephrologist

## 2022-02-08 NOTE — ASSESSMENT & PLAN NOTE
LDL bumped up to 115 from 100  Will continue pravastatin  Will continue to monitor lab data as  Cholesterol    Eat low cholesterol diet    Continue taking your cholesterol medicine as advised    Call if any side effects    Lipid Profile and LFT prior to next visit or as advised  ( You should Get periodically to monitor liver side effects)    Know you LDL ( Bad Cholesterol ) , HDL ( Good Cholesterol ) and Triglyceride goal

## 2022-02-08 NOTE — PROGRESS NOTES
[facility] Office Visit Note  22     Brian Barrios Reger 67 y o  male MRN: [id number]  : 1945    Assessment:     1  Stage 3a chronic kidney disease [facility])  Assessment & Plan:  Lab Results   Component Value Date    EGFR 54 10/05/2021    EGFR 52 2021    EGFR 46 2021    CREATININE 1 28 10/05/2021    CREATININE 1 32 (H) 2021    CREATININE 1 47 (H) 2021   GFR is baseline  Creat is baseline  Recommend periodic blood test for monitoring of BUN and Creat  Avoid Non Steroidal Antiinflammatory such as ibuprofen, aleve etc   Potassium, HCO3 and calcium are wnl and will require periodic blood test   Bone and Mineral disease monitoring as appropriate  All patient with GFR less than 30( CKD 4 or 5 or 6) advised to follow with nephrologist       Orders:  -     Basic metabolic panel; Future; Expected date: 2022  -     CBC; Future; Expected date: 2022    2  Erectile dysfunction, unspecified erectile dysfunction type  -     tadalafil (CIALIS) 20 MG tablet; Take 1 tablet (20 mg total) by mouth daily as needed for erectile dysfunction    3  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  Assessment & Plan:  Due for endoscopy  Symptom-free from acid reflux symptoms as well as Shin's symptoms  Remains on famotidine 40 mg without side effect  Orders:  -     CBC; Future; Expected date: 2022    4  Benign prostatic hyperplasia, unspecified whether lower urinary tract symptoms present  Assessment & Plan:  No longer on Flomax  Patient is entertaining option of Cialis 5 mg daily he will check with his insurance and pharmacist     A the BPH symptoms are present  5  Anemia due to stage 3 chronic kidney disease, unspecified whether stage 3a or 3b CKD (HCC)  Assessment & Plan:  Symptom-free  No symptoms of anemia  Will recheck CBC along with BMP next visit    Orders:  -     Basic metabolic panel; Future; Expected date: 2022  -     CBC; Future; Expected date: 2022    6  Hypercholesteremia  Assessment & Plan:  LDL bumped up to 115 from 100  Will continue pravastatin  Will continue to monitor lab data as  Cholesterol    Eat low cholesterol diet    Continue taking your cholesterol medicine as advised    Call if any side effects    Lipid Profile and LFT prior to next visit or as advised  ( You should Get periodically to monitor liver side effects)    Know you LDL ( Bad Cholesterol ) , HDL ( Good Cholesterol ) and Triglyceride goal        7  Platelets decreased ([address] )  Assessment & Plan:  Follow-up follow-up CBC      8  Elevated blood pressure reading  Assessment & Plan:  Blood pressure was 130 this morning  Excellent this evening 110/70      9  Irregular heart beat  Assessment & Plan:  September 2020:  Holter monitor:  1  48 hour Holter monitor shows average heart rate 73 beats per minute  Short nonsustained atrial runs noted during sleep hours  Frequent PACs and PVCs noted there were about 2 percent of the total beat combined  No evidence of any sustained tachy-brielle arrhythmias noted  Patient mention no significant symptoms during awake hours  2  Rhythm was sinus throughout monitoring period  His not having any palpitations  He is no longer on metoprolol  He was seen by cardiologist   Their notes were reviewed  10  Vitamin D insufficiency  Assessment & Plan:  Vitamin-D remains 1000 unit daily      11  Acute pain of right knee  Assessment & Plan:  Knee pain resolved  Discussion Summary and Plan: Today's care plan and medications were reviewed with patient in detail and all their questions answered to their satisfaction  Chief Complaint   Patient presents with    CKD III    Hyperlipidemia    Follow-up    GERD     History of Shin's, low platelet at 1 time, history of anemia,      Subjective:  Patient is here for chronic disease management  No further knee problem      Irregular heartbeat: No palpitation, dizziness, no cardiac complains, pt cut down on chocolate and caffiene  Occasionally gets palpitation  Cardiologist notes were reviewed  For consideration of metoprolol if symptoms persist Patient underwent stress test which was unremarkable  Patient no longer taking metoprolol, seen by cardiologist this morning  09/14/2020:  Holter monitor :1  48 hour Holter monitor shows average heart rate 73 beats per minute  Short nonsustained atrial runs noted during sleep hours  Frequent PACs and PVCs noted there were about 2 percent of the total beat combined  No evidence of any sustained tachy-brielle arrhythmias noted  Patient mention no significant symptoms during awake hours  2  Rhythm was sinus throughout monitoring period  Vitamin-D deficiency remains on vitamin-D supplement    Hyperlipidemia , on pravastatin now we will continue to monitor labs including lipid and lft  Was previously Cholesterol 190 which was 167 and  which was 100 before  LFTs normal     cardiac murmur he he did have a previous history of mild valvular disease   Cardiologist not were noted  No echocardiogram is planned Previous echocardiogram from 2019 May reviewed  Normal ejection fraction moderate L a otherwise unremarkable  PACs and PVCs    He is not feeling much palpitation except 1 or 2 time he had a couple of minor stressful situation and did feel that  Abnormal EKG with ST flattening again being worked up with stress test and echocardiogram     CKD level 2 3 will monitor  Anemia is not a major issue  Benign prostatic hypertrophy:  He stop date is Flomax  He goes to the bathroom 10 times a day  Nighttime 2-5 times  Strong stream is weaker  But no dribbling or incontinent  He may be interested in Cialis 5 mg and tennis of he will think about it    Patient used to see urologist before since Raciel came he stop taking it    A Cologuard 06/11/2021 Negative    01/13/2022:  LFT normal, cholesterol 190, ,  06/14/2021 cholesterol 167 triglyceride 58 , creatinine 1 32 BUN 22 GFR 52,  12/21/2020 cholesterol 173 triglyceride 87  creatinine 1 47 rest of the BMP normal GFR 46  08/22/2020 cholesterol 177  creatinine 1 49 GFR 49 rest of the CMP normal 2 1, vitamin-D 47 6, TH 47, magnesium  02/26/2020 cholesterol 193  HDL 55 creatinine 1 31 rest of the CMP normal GFR 53  01/20/2021:  BUN 19 creatinine 1 47 GFR 46 rest of the BMP normal  September 2020:  CBC was normal with hemoglobin 14 3      9-: stress test:JUAN JOSE PROTOCOL:  ECG conclusions: The stress ECG was negative for ischemia      IMPRESSIONS: Normal study after maximal exercise without reproduction of symptoms  No visits with results within 2 Week(s) from this visit  Latest known visit with results is:  Appointment on 06/18/2021  WBC                       Value: 7 48(Thousand/uL)  Dt: 06/18/2021  RBC                       Value: 4 45(Million/uL)   Dt: 06/18/2021  Hemoglobin                Value: 13 6(g/dL)         Dt: 06/18/2021  Hematocrit                Value: 40 8(%)            Dt: 06/18/2021  MCV                       Value: 92(fL)             Dt: 06/18/2021  MCH                       Value: 30 6(pg)           Dt: 06/18/2021  MCHC                      Value: 33 3(g/dL)         Dt: 06/18/2021  RDW                       Value: 12 7(%)            Dt: 06/18/2021  Platelets                 Value: 202(Thousands/uL)  Dt: 06/18/2021  MPV                       Value: 10 9(fL)           Dt: 06/18/2021  Lyme total antibody       Value: 91                 Dt: 06/18/2021  ------------ - 2 weeks          The following portions of the patient's history were reviewed and updated as appropriate: allergies, current medications, past family history, past medical history, past social history, past surgical history and problem list     Review of Systems   Genitourinary: Positive for frequency   Negative for decreased urine volume, hematuria, penile pain, scrotal swelling, testicular pain and urgency  All other systems reviewed and are negative  Historical Information   Patient Active Problem List   Diagnosis    Stage 3a chronic kidney disease ([facility] [address] )    Nephrolithiasis    Anemia    Heart burn    BPH (benign prostatic hyperplasia)    Shin esophagus    Hypercholesteremia    Irregular heart beat    Elevated blood pressure reading    Abnormal EKG    Rash    Platelets decreased (HCC)    Fall    Herpes    Acute knee pain    Knee pain, right    Vitamin D insufficiency    Renal cyst     Past Medical History:   Diagnosis Date    BPH (benign prostatic hyperplasia)     GERD (gastroesophageal reflux disease)     Herpes      History reviewed  No pertinent surgical history    Social History     Substance and Sexual Activity   Alcohol Use Yes    Alcohol/week: 1 0 - 2 0 standard drink    Types: 1 - 2 Glasses of wine per week    Comment: occas     Social History     Substance and Sexual Activity   Drug Use No     Social History     Tobacco Use   Smoking Status Never Smoker   Smokeless Tobacco Never Used     Family History   Problem Relation Age of Onset    No Known Problems Mother     No Known Problems Father      Health Maintenance Due   Topic    Pneumococcal Vaccine: 65+ Years (1 of 1 - PPSV23)    Medicare Annual Wellness Visit (AWV)       Meds/Allergies       Current Outpatient Medications:     aspirin (ECOTRIN LOW STRENGTH) 81 mg EC tablet, Take 81 mg by mouth daily, Disp: , Rfl:     cholecalciferol (VITAMIN D3) 1,000 units tablet, Take 1,000 Units by mouth daily, Disp: , Rfl:     famotidine (PEPCID) 40 MG tablet, Take 1 tablet (40 mg total) by mouth daily, Disp: 90 tablet, Rfl: 1    pravastatin (PRAVACHOL) 10 mg tablet, Take 1 tablet (10 mg total) by mouth daily, Disp: 90 tablet, Rfl: 3    tadalafil (CIALIS) 20 MG tablet, Take 1 tablet (20 mg total) by mouth daily as needed for erectile dysfunction, Disp: 5 tablet, Rfl: 2    vitamin B-12 (VITAMIN B-12) 1,000 mcg tablet, Take 1,000 mcg by mouth daily, Disp: , Rfl:       Objective:    Vitals:   /70   Pulse 70   Temp (!) 97 3 °F (36 3 °C)   Ht 5' 9" (1 753 m)   Wt 69 4 kg (153 lb)   SpO2 98%   BMI 22 59 kg/m²   Body mass index is 22 59 kg/m²  Vitals:    02/08/22 1416   Weight: 69 4 kg (153 lb)       Physical Exam  Vitals reviewed  Constitutional:       General: He is not in acute distress  Appearance: Normal appearance  He is not ill-appearing, toxic-appearing or diaphoretic  HENT:      Head: Normocephalic and atraumatic  Right Ear: External ear normal       Left Ear: External ear normal       Mouth/Throat:      Mouth: Mucous membranes are moist    Eyes:      General:         Right eye: No discharge  Left eye: No discharge  Extraocular Movements: Extraocular movements intact  Cardiovascular:      Rate and Rhythm: Normal rate and regular rhythm  Heart sounds: S1 normal and S2 normal    Pulmonary:      Effort: Pulmonary effort is normal       Breath sounds: Normal breath sounds  No wheezing, rhonchi or rales  Abdominal:      Palpations: Abdomen is soft  There is no mass  Tenderness: There is no abdominal tenderness  There is no rebound  Musculoskeletal:      Cervical back: Neck supple  Right knee: No swelling, deformity, effusion, erythema or crepitus  Normal range of motion  No tenderness  No LCL laxity, MCL laxity, ACL laxity or PCL laxity  Normal meniscus  Right lower leg: No edema  Left lower leg: No edema  Skin:     General: Skin is warm and dry  Coloration: Skin is not jaundiced or pale  Neurological:      General: No focal deficit present  Mental Status: He is alert and oriented to person, place, and time  Psychiatric:         Mood and Affect: Mood normal          Behavior: Behavior normal          Thought Content:  Thought content normal          Judgment: Judgment normal          Lab Review   Appointment on 01/13/2022   Component Date Value Ref Range Status    Total Bilirubin 01/13/2022 0 60  0 20 - 1 00 mg/dL Final    Use of this assay is not recommended for patients undergoing treatment with eltrombopag due to the potential for falsely elevated results   Bilirubin, Direct 01/13/2022 0 12  0 00 - 0 20 mg/dL Final    Alkaline Phosphatase 01/13/2022 80  46 - 116 U/L Final    AST 01/13/2022 23  5 - 45 U/L Final    Specimen collection should occur prior to Sulfasalazine and/or Sulfapyridine administration due to the potential for falsely depressed results   ALT 01/13/2022 26  12 - 78 U/L Final    Specimen collection should occur prior to Sulfasalazine and/or Sulfapyridine administration due to the potential for falsely depressed results   Total Protein 01/13/2022 7 7  6 4 - 8 2 g/dL Final    Albumin 01/13/2022 4 0  3 5 - 5 0 g/dL Final    Cholesterol 01/13/2022 190  See Comment mg/dL Final    Cholesterol:         Pediatric <18 Years        Desirable          <170 mg/dL      Borderline High    170-199 mg/dL      High               >=200 mg/dL        Adult >=18 Years            Desirable         <200 mg/dL      Borderline High   200-239 mg/dL      High              >239 mg/dL      Triglycerides 01/13/2022 126  See Comment mg/dL Final    Triglyceride:     0-9Y            <75mg/dL     10Y-17Y         <90 mg/dL       >=18Y     Normal          <150 mg/dL     Borderline High 150-199 mg/dL     High            200-499 mg/dL        Very High       >499 mg/dL    Specimen collection should occur prior to N-Acetylcysteine or Metamizole administration due to the potential for falsely depressed results   HDL, Direct 01/13/2022 50  >=40 mg/dL Final    Specimen collection should occur prior to Metamizole administration due to the potential for falsley depressed results      LDL Calculated 01/13/2022 115* 0 - 100 mg/dL Final    LDL Cholesterol:     Optimal           <100 mg/dl     Near Optimal      100-129 mg/dl     Above Optimal       Borderline High 130-159 mg/dl       High            160-189 mg/dl       Very High       >189 mg/dl         This screening LDL is a calculated result  It does not have the accuracy of the Direct Measured LDL in the monitoring of patients with hyperlipidemia and/or statin therapy  Direct Measure LDL (IIC060) must be ordered separately in these patients   Non-HDL-Chol (CHOL-HDL) 01/13/2022 140  mg/dl Final         Patient Instructions   Follow with Consultants as per their and our suggestion    Follow up in 12 week(s) or as needed earlier    Follow all instructions as advised and discussed  Take your medications as prescribed  Call the office immediately if you experience any side effects  Ask questions if you do not understand  Keep your scheduled appointment as advised or come sooner if necessary or in doubt  Best time to call for non-urgent matter or questions on weekdays is between 9am and 12 noon  See physician for any new symptoms or worsening of current symptoms  Urgent or emergent situations call 911 and report to nearest emergency room  I spent  30 -40 minutes taking care of this patient including clinical care, conseling, collaboration, chart, lab and consultaion review as appropriate    Patient is to get labs 1 week(s) prior to next visit if advised               Dr Briana Asher MD  [facility]       "This note has been constructed using a voice recognition system  Therefore there may be syntax, spelling, and/or grammatical errors   Please call if you have any questions  "

## 2022-02-15 NOTE — TELEPHONE ENCOUNTER
Called and spoke to pt whom refused to schedule colon, however, wants to discuss EGD  He is scheduled for an appt with Dr Zachariah Concepcion

## 2022-02-24 DIAGNOSIS — N40.0 BENIGN PROSTATIC HYPERPLASIA, UNSPECIFIED WHETHER LOWER URINARY TRACT SYMPTOMS PRESENT: Primary | ICD-10-CM

## 2022-02-24 RX ORDER — TADALAFIL 5 MG/1
5 TABLET ORAL DAILY
Qty: 30 TABLET | Refills: 1 | Status: SHIPPED | OUTPATIENT
Start: 2022-02-24

## 2022-02-24 RX ORDER — TADALAFIL 5 MG/1
5 TABLET ORAL DAILY PRN
Qty: 30 TABLET | Refills: 1 | Status: SHIPPED | OUTPATIENT
Start: 2022-02-24 | End: 2022-03-25 | Stop reason: SDUPTHER

## 2022-02-24 NOTE — PROGRESS NOTES
Pt is requesting Cialis for BPH  Pt is symptomatic from BPH stand pint  Will start as above    Follow up in 4 weeks

## 2022-03-04 ENCOUNTER — APPOINTMENT (OUTPATIENT)
Dept: LAB | Facility: CLINIC | Age: 77
End: 2022-03-04
Payer: MEDICARE

## 2022-03-04 LAB
BILIRUB UR QL STRIP: NEGATIVE
CLARITY UR: CLEAR
COLOR UR: NORMAL
GLUCOSE UR STRIP-MCNC: NEGATIVE MG/DL
HGB UR QL STRIP.AUTO: NEGATIVE
KETONES UR STRIP-MCNC: NEGATIVE MG/DL
LEUKOCYTE ESTERASE UR QL STRIP: NEGATIVE
NITRITE UR QL STRIP: NEGATIVE
PH UR STRIP.AUTO: 6 [PH]
PROT UR STRIP-MCNC: NEGATIVE MG/DL
SP GR UR STRIP.AUTO: 1.02 (ref 1–1.03)
UROBILINOGEN UR STRIP-ACNC: <2 MG/DL

## 2022-03-04 PROCEDURE — 81003 URINALYSIS AUTO W/O SCOPE: CPT | Performed by: INTERNAL MEDICINE

## 2022-03-24 ENCOUNTER — OFFICE VISIT (OUTPATIENT)
Dept: GASTROENTEROLOGY | Facility: CLINIC | Age: 77
End: 2022-03-24
Payer: MEDICARE

## 2022-03-24 VITALS
DIASTOLIC BLOOD PRESSURE: 69 MMHG | HEART RATE: 64 BPM | SYSTOLIC BLOOD PRESSURE: 145 MMHG | HEIGHT: 69 IN | BODY MASS INDEX: 21.92 KG/M2 | WEIGHT: 148 LBS

## 2022-03-24 DIAGNOSIS — K63.5 HYPERPLASTIC COLONIC POLYP, UNSPECIFIED PART OF COLON: ICD-10-CM

## 2022-03-24 DIAGNOSIS — K21.9 GASTROESOPHAGEAL REFLUX DISEASE WITHOUT ESOPHAGITIS: Primary | ICD-10-CM

## 2022-03-24 DIAGNOSIS — K22.70 BARRETT'S ESOPHAGUS WITHOUT DYSPLASIA: ICD-10-CM

## 2022-03-24 PROCEDURE — 99204 OFFICE O/P NEW MOD 45 MIN: CPT | Performed by: INTERNAL MEDICINE

## 2022-03-24 NOTE — PROGRESS NOTES
[facility] - Outpatient Consultation  Darren Cardenas 67 y o  male MRN: [id number]  Encounter: [id number]          ASSESSMENT AND PLAN:      1  Gastroesophageal reflux disease without esophagitis  Patient has history of longstanding gastroesophageal reflux disease  More recently he is taking Pepcid 40 mg after dinner which is controlling his symptoms  He does not have any dysphagia or odynophagia  Patient has history of Barretts esophagus  3 cm long Shin's esophagus was noted during upper endoscopy done four years ago  Patient did not have any dysplasia  We had a long discussion regarding Shin's esophagus and dysplasia  Repeat endoscopy is suggested at this time  Patient will get upper endoscopy  He will continue reflux precautions  He will also continue on Pepcid  - EGD; Future    2  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  History of Shin's esophagus as defined above  There was no dysplasia  Upper endoscopy was done five years ago  Repeat endoscopy is recommended at this time for evaluation   - EGD; Future    3  Hyperplastic colonic polyp, unspecified part of colon  Patient had colonoscopy done six years ago  3 polyps removed  1 of them was large  All the polyps are hyperplastic  We had a long discussion regarding colon polyp and recurrence  Patient will have about colonoscopy done repeated at the time of upper endoscopy  This could be his last colonoscopy  Prep has been discussed  - Colonoscopy; Future    ______________________________________________________________________    HPI:  Occasional reflux  There is no dysphagia or odynophagia  Denies any abdominal pain  Denies any nausea or vomiting  Denies any change in bowel habits or rectal bleeding  REVIEW OF SYSTEMS:    CONSTITUTIONAL: Denies any fever, chills, rigors, and weight loss  HEENT: No earache or tinnitus  Denies hearing loss or visual disturbances    CARDIOVASCULAR: No chest pain or palpitations  RESPIRATORY: Denies any cough, hemoptysis, shortness of breath or dyspnea on exertion  GASTROINTESTINAL: As noted in the History of Present Illness  GENITOURINARY: No problems with urination  Denies any hematuria or dysuria  NEUROLOGIC: No dizziness or vertigo, denies headaches  MUSCULOSKELETAL: Denies any muscle or joint pain  SKIN: Denies skin rashes or itching  ENDOCRINE: Denies excessive thirst  Denies intolerance to heat or cold  PSYCHOSOCIAL: Denies depression or anxiety  Denies any recent memory loss  Historical Information   Past Medical History:   Diagnosis Date    BPH (benign prostatic hyperplasia)     GERD (gastroesophageal reflux disease)     Herpes      History reviewed  No pertinent surgical history  Social History   Social History     Substance and Sexual Activity   Alcohol Use Yes    Alcohol/week: 1 0 - 2 0 standard drink    Types: 1 - 2 Glasses of wine per week    Comment: occas     Social History     Substance and Sexual Activity   Drug Use No     Social History     Tobacco Use   Smoking Status Never Smoker   Smokeless Tobacco Never Used     Family History   Problem Relation Age of Onset    No Known Problems Mother     No Known Problems Father        Meds/Allergies       Current Outpatient Medications:     aspirin (ECOTRIN LOW STRENGTH) 81 mg EC tablet    cholecalciferol (VITAMIN D3) 1,000 units tablet    famotidine (PEPCID) 40 MG tablet    pravastatin (PRAVACHOL) 10 mg tablet    tadalafil (CIALIS) 5 MG tablet    tadalafil (CIALIS) 5 MG tablet    vitamin B-12 (VITAMIN B-12) 1,000 mcg tablet    No Known Allergies        Objective     Blood pressure 145/69, pulse 64, height 5' 9" (1 753 m), weight 67 1 kg (148 lb)  Body mass index is 21 86 kg/m²          PHYSICAL EXAM:      General Appearance:   Alert, cooperative, no distress   HEENT:   Normocephalic, atraumatic, anicteric      Neck:  Supple, symmetrical, trachea midline   Lungs:   Clear to auscultation bilaterally; no rales, rhonchi or wheezing; respirations unlabored    Heart[de-identified]   Regular rate and rhythm; no murmur, rub, or gallop  Abdomen:   Soft, non-tender, non-distended; normal bowel sounds; no masses, no organomegaly    Genitalia:   Deferred    Rectal:   Deferred    Extremities:  No cyanosis, clubbing or edema    Pulses:  2+ and symmetric    Skin:  No jaundice, rashes, or lesions    Lymph nodes:  No palpable cervical lymphadenopathy        Lab Results:   No visits with results within 1 Day(s) from this visit  Latest known visit with results is:   Orders Only on 02/24/2022   Component Date Value    Color, UA 03/04/2022 Light Yellow     Clarity, UA 03/04/2022 Clear     Specific Gravity, UA 03/04/2022 1 019     pH, UA 03/04/2022 6 0     Leukocytes, UA 03/04/2022 Negative     Nitrite, UA 03/04/2022 Negative     Protein, UA 03/04/2022 Negative     Glucose, UA 03/04/2022 Negative     Ketones, UA 03/04/2022 Negative     Urobilinogen, UA 03/04/2022 <2 0     Bilirubin, UA 03/04/2022 Negative     Blood, UA 03/04/2022 Negative          Radiology Results:   No results found

## 2022-03-24 NOTE — H&P (VIEW-ONLY)
[facility] - Outpatient Consultation  Tosha Cardenas 67 y o  male MRN: [id number]  Encounter: [id number]          ASSESSMENT AND PLAN:      1  Gastroesophageal reflux disease without esophagitis  Patient has history of longstanding gastroesophageal reflux disease  More recently he is taking Pepcid 40 mg after dinner which is controlling his symptoms  He does not have any dysphagia or odynophagia  Patient has history of Barretts esophagus  3 cm long Shin's esophagus was noted during upper endoscopy done four years ago  Patient did not have any dysplasia  We had a long discussion regarding Shin's esophagus and dysplasia  Repeat endoscopy is suggested at this time  Patient will get upper endoscopy  He will continue reflux precautions  He will also continue on Pepcid  - EGD; Future    2  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  History of Shin's esophagus as defined above  There was no dysplasia  Upper endoscopy was done five years ago  Repeat endoscopy is recommended at this time for evaluation   - EGD; Future    3  Hyperplastic colonic polyp, unspecified part of colon  Patient had colonoscopy done six years ago  3 polyps removed  1 of them was large  All the polyps are hyperplastic  We had a long discussion regarding colon polyp and recurrence  Patient will have about colonoscopy done repeated at the time of upper endoscopy  This could be his last colonoscopy  Prep has been discussed  - Colonoscopy; Future    ______________________________________________________________________    HPI:  Occasional reflux  There is no dysphagia or odynophagia  Denies any abdominal pain  Denies any nausea or vomiting  Denies any change in bowel habits or rectal bleeding  REVIEW OF SYSTEMS:    CONSTITUTIONAL: Denies any fever, chills, rigors, and weight loss  HEENT: No earache or tinnitus  Denies hearing loss or visual disturbances    CARDIOVASCULAR: No chest pain or palpitations  RESPIRATORY: Denies any cough, hemoptysis, shortness of breath or dyspnea on exertion  GASTROINTESTINAL: As noted in the History of Present Illness  GENITOURINARY: No problems with urination  Denies any hematuria or dysuria  NEUROLOGIC: No dizziness or vertigo, denies headaches  MUSCULOSKELETAL: Denies any muscle or joint pain  SKIN: Denies skin rashes or itching  ENDOCRINE: Denies excessive thirst  Denies intolerance to heat or cold  PSYCHOSOCIAL: Denies depression or anxiety  Denies any recent memory loss  Historical Information   Past Medical History:   Diagnosis Date    BPH (benign prostatic hyperplasia)     GERD (gastroesophageal reflux disease)     Herpes      History reviewed  No pertinent surgical history  Social History   Social History     Substance and Sexual Activity   Alcohol Use Yes    Alcohol/week: 1 0 - 2 0 standard drink    Types: 1 - 2 Glasses of wine per week    Comment: occas     Social History     Substance and Sexual Activity   Drug Use No     Social History     Tobacco Use   Smoking Status Never Smoker   Smokeless Tobacco Never Used     Family History   Problem Relation Age of Onset    No Known Problems Mother     No Known Problems Father        Meds/Allergies       Current Outpatient Medications:     aspirin (ECOTRIN LOW STRENGTH) 81 mg EC tablet    cholecalciferol (VITAMIN D3) 1,000 units tablet    famotidine (PEPCID) 40 MG tablet    pravastatin (PRAVACHOL) 10 mg tablet    tadalafil (CIALIS) 5 MG tablet    tadalafil (CIALIS) 5 MG tablet    vitamin B-12 (VITAMIN B-12) 1,000 mcg tablet    No Known Allergies        Objective     Blood pressure 145/69, pulse 64, height 5' 9" (1 753 m), weight 67 1 kg (148 lb)  Body mass index is 21 86 kg/m²          PHYSICAL EXAM:      General Appearance:   Alert, cooperative, no distress   HEENT:   Normocephalic, atraumatic, anicteric      Neck:  Supple, symmetrical, trachea midline   Lungs:   Clear to auscultation bilaterally; no rales, rhonchi or wheezing; respirations unlabored    Heart[de-identified]   Regular rate and rhythm; no murmur, rub, or gallop  Abdomen:   Soft, non-tender, non-distended; normal bowel sounds; no masses, no organomegaly    Genitalia:   Deferred    Rectal:   Deferred    Extremities:  No cyanosis, clubbing or edema    Pulses:  2+ and symmetric    Skin:  No jaundice, rashes, or lesions    Lymph nodes:  No palpable cervical lymphadenopathy        Lab Results:   No visits with results within 1 Day(s) from this visit  Latest known visit with results is:   Orders Only on 02/24/2022   Component Date Value    Color, UA 03/04/2022 Light Yellow     Clarity, UA 03/04/2022 Clear     Specific Gravity, UA 03/04/2022 1 019     pH, UA 03/04/2022 6 0     Leukocytes, UA 03/04/2022 Negative     Nitrite, UA 03/04/2022 Negative     Protein, UA 03/04/2022 Negative     Glucose, UA 03/04/2022 Negative     Ketones, UA 03/04/2022 Negative     Urobilinogen, UA 03/04/2022 <2 0     Bilirubin, UA 03/04/2022 Negative     Blood, UA 03/04/2022 Negative          Radiology Results:   No results found

## 2022-03-25 ENCOUNTER — OFFICE VISIT (OUTPATIENT)
Dept: INTERNAL MEDICINE CLINIC | Facility: CLINIC | Age: 77
End: 2022-03-25
Payer: MEDICARE

## 2022-03-25 VITALS
DIASTOLIC BLOOD PRESSURE: 82 MMHG | SYSTOLIC BLOOD PRESSURE: 126 MMHG | OXYGEN SATURATION: 98 % | WEIGHT: 146 LBS | HEART RATE: 62 BPM | HEIGHT: 69 IN | BODY MASS INDEX: 21.62 KG/M2

## 2022-03-25 DIAGNOSIS — Z00.00 MEDICARE ANNUAL WELLNESS VISIT, SUBSEQUENT: ICD-10-CM

## 2022-03-25 DIAGNOSIS — N40.0 BENIGN PROSTATIC HYPERPLASIA, UNSPECIFIED WHETHER LOWER URINARY TRACT SYMPTOMS PRESENT: Primary | ICD-10-CM

## 2022-03-25 DIAGNOSIS — N18.31 STAGE 3A CHRONIC KIDNEY DISEASE (HCC): ICD-10-CM

## 2022-03-25 DIAGNOSIS — R12 HEART BURN: ICD-10-CM
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
## 2022-03-25 PROBLEM — M25.561 KNEE PAIN, RIGHT: Status: RESOLVED | Noted: 2021-10-11 | Resolved: 2022-03-25

## 2022-03-25 PROCEDURE — G0439 PPPS, SUBSEQ VISIT: HCPCS | Performed by: INTERNAL MEDICINE

## 2022-03-25 PROCEDURE — 1124F ACP DISCUSS-NO DSCNMKR DOCD: CPT | Performed by: INTERNAL MEDICINE

## 2022-03-25 PROCEDURE — 99213 OFFICE O/P EST LOW 20 MIN: CPT | Performed by: INTERNAL MEDICINE

## 2022-03-25 RX ORDER — FAMOTIDINE 40 MG/1
40 TABLET, FILM COATED ORAL DAILY
Qty: 90 TABLET | Refills: 1 | Status: SHIPPED | OUTPATIENT
Start: 2022-03-25

## 2022-03-25 RX ORDER — TADALAFIL 5 MG/1
5 TABLET ORAL DAILY PRN
Qty: 90 TABLET | Refills: 1 | Status: SHIPPED | OUTPATIENT
Start: 2022-03-25

## 2022-03-25 NOTE — PATIENT INSTRUCTIONS
Medicare Preventive Visit Patient Instructions  Thank you for completing your Welcome to Medicare Visit or Medicare Annual Wellness Visit today  Your next wellness visit will be due in one year (3/26/2023)  The screening/preventive services that you may require over the next 5-10 years are detailed below  Some tests may not apply to you based off risk factors and/or age  Screening tests ordered at today's visit but not completed yet may show as past due  Also, please note that scanned in results may not display below  Preventive Screenings:  Service Recommendations Previous Testing/Comments   Colorectal Cancer Screening  · Colonoscopy    · Fecal Occult Blood Test (FOBT)/Fecal Immunochemical Test (FIT)  · Fecal DNA/Cologuard Test  · Flexible Sigmoidoscopy Age: 54-65 years old   Colonoscopy: every 10 years (May be performed more frequently if at higher risk)  OR  FOBT/FIT: every 1 year  OR  Cologuard: every 3 years  OR  Sigmoidoscopy: every 5 years  Screening may be recommended earlier than age 48 if at higher risk for colorectal cancer  Also, an individualized decision between you and your healthcare provider will decide whether screening between the ages of 74-80 would be appropriate  Colonoscopy: 02/17/2016  FOBT/FIT: Not on file  Cologuard: 06/08/2021  Sigmoidoscopy: Not on file          Prostate Cancer Screening Individualized decision between patient and health care provider in men between ages of 53-78   Medicare will cover every 12 months beginning on the day after your 50th birthday PSA: 1 2 ng/mL           Hepatitis C Screening Once for adults born between 1945 and 1965  More frequently in patients at high risk for Hepatitis C Hep C Antibody: 06/14/2021        Diabetes Screening 1-2 times per year if you're at risk for diabetes or have pre-diabetes Fasting glucose: 84 mg/dL   A1C: No results in last 5 years        Cholesterol Screening Once every 5 years if you don't have a lipid disorder   May order more often based on risk factors  Lipid panel: 01/13/2022           Other Preventive Screenings Covered by Medicare:  1  Abdominal Aortic Aneurysm (AAA) Screening: covered once if your at risk  You're considered to be at risk if you have a family history of AAA or a male between the age of 73-68 who smoking at least 100 cigarettes in your lifetime  2  Lung Cancer Screening: covers low dose CT scan once per year if you meet all of the following conditions: (1) Age 50-69; (2) No signs or symptoms of lung cancer; (3) Current smoker or have quit smoking within the last 15 years; (4) You have a tobacco smoking history of at least 30 pack years (packs per day x number of years you smoked); (5) You get a written order from a healthcare provider  3  Glaucoma Screening: covered annually if you're considered high risk: (1) You have diabetes OR (2) Family history of glaucoma OR (3)  aged 48 and older OR (3)  American aged 72 and older  3  Osteoporosis Screening: covered every 2 years if you meet one of the following conditions: (1) Have a vertebral abnormality; (2) On glucocorticoid therapy for more than 3 months; (3) Have primary hyperparathyroidism; (4) On osteoporosis medications and need to assess response to drug therapy  5  HIV Screening: covered annually if you're between the age of 12-76  Also covered annually if you are younger than 13 and older than 72 with risk factors for HIV infection  For pregnant patients, it is covered up to 3 times per pregnancy      Immunizations:  Immunization Recommendations   Influenza Vaccine Annual influenza vaccination during flu season is recommended for all persons aged >= 6 months who do not have contraindications   Pneumococcal Vaccine (Prevnar and Pneumovax)  * Prevnar = PCV13  * Pneumovax = PPSV23 Adults 25-60 years old: 1-3 doses may be recommended based on certain risk factors  Adults 72 years old: Prevnar (PCV13) vaccine recommended followed by Pneumovax (PPSV23) vaccine  If already received PPSV23 since turning 65, then PCV13 recommended at least one year after PPSV23 dose  Hepatitis B Vaccine 3 dose series if at intermediate or high risk (ex: diabetes, end stage renal disease, liver disease)   Tetanus (Td) Vaccine - COST NOT COVERED BY MEDICARE PART B Following completion of primary series, a booster dose should be given every 10 years to maintain immunity against tetanus  Td may also be given as tetanus wound prophylaxis  Tdap Vaccine - COST NOT COVERED BY MEDICARE PART B Recommended at least once for all adults  For pregnant patients, recommended with each pregnancy  Shingles Vaccine (Shingrix) - COST NOT COVERED BY MEDICARE PART B  2 shot series recommended in those aged 48 and above     Health Maintenance Due:      Topic Date Due    Hepatitis C Screening  Completed     Immunizations Due:      Topic Date Due    Pneumococcal Vaccine: 65+ Years (1 of 1 - PPSV23) Never done     Advance Directives   What are advance directives? Advance directives are legal documents that state your wishes and plans for medical care  These plans are made ahead of time in case you lose your ability to make decisions for yourself  Advance directives can apply to any medical decision, such as the treatments you want, and if you want to donate organs  What are the types of advance directives? There are many types of advance directives, and each state has rules about how to use them  You may choose a combination of any of the following:  · Living will: This is a written record of the treatment you want  You can also choose which treatments you do not want, which to limit, and which to stop at a certain time  This includes surgery, medicine, IV fluid, and tube feedings  · Durable power of  for healthcare [facility]): This is a written record that states who you want to make healthcare choices for you when you are unable to make them for yourself   This person, called destiny proxy, is usually a family member or a friend  You may choose more than 1 proxy  · Do not resuscitate (DNR) order:  A DNR order is used in case your heart stops beating or you stop breathing  It is a request not to have certain forms of treatment, such as CPR  A DNR order may be included in other types of advance directives  · Medical directive: This covers the care that you want if you are in a coma, near death, or unable to make decisions for yourself  You can list the treatments you want for each condition  Treatment may include pain medicine, surgery, blood transfusions, dialysis, IV or tube feedings, and a ventilator (breathing machine)  · Values history: This document has questions about your views, beliefs, and how you feel and think about life  This information can help others choose the care that you would choose  Why are advance directives important? An advance directive helps you control your care  Although spoken wishes may be used, it is better to have your wishes written down  Spoken wishes can be misunderstood, or not followed  Treatments may be given even if you do not want them  An advance directive may make it easier for your family to make difficult choices about your care  © Copyright [employer] 2018 Information is for End User's use only and may not be sold, redistributed or otherwise used for commercial purposes  All illustrations and images included in CareNotes® are the copyrighted property of [employer]  or Paige Agudelo      Follow with Consultants as per their and our suggestion    Follow up in 12 week(s) or as needed earlier    Follow all instructions as advised and discussed  Take your medications as prescribed  Call the office immediately if you experience any side effects  Ask questions if you do not understand  Keep your scheduled appointment as advised or come sooner if necessary or in doubt      Best time to call for non-urgent matter or questions on weekdays is between 9am and 12 noon  See physician for any new symptoms or worsening of current symptoms  Urgent or emergent situations call 911 and report to nearest emergency room      I spent  30 -40 minutes taking care of this patient including clinical care, conseling, collaboration, chart, lab and consultaion review as appropriate    Patient is to get labs 1 week(s) prior to next visit if advised

## 2022-03-25 NOTE — PROGRESS NOTES
[facility] Office Visit Note  22     Elo Cardenas 67 y o  male MRN: [id number]  : 1945    Assessment:     1  Benign prostatic hyperplasia, unspecified whether lower urinary tract symptoms present  -     tadalafil (CIALIS) 5 MG tablet; Take 1 tablet (5 mg total) by mouth daily as needed for erectile dysfunction    2  Medicare annual wellness visit, subsequent    3  Heart burn  -     famotidine (PEPCID) 40 MG tablet; Take 1 tablet (40 mg total) by mouth daily    4  Stage 3a chronic kidney disease ([address] )          Discussion Summary and Plan: Today's care plan and medications were reviewed with patient in detail and all their questions answered to their satisfaction  Chief Complaint   Patient presents with   [facility] Wellness Visit    Benign Prostatic Hypertrophy      Subjective:  Patient is here for follow-up of BPH  Benign prostatic hypertrophy:  He stop date is Flomax  He was going to to the bathroom 10 times a day  Nighttime 2-5 times  Strong stream is weaker  But no dribbling or incontinent  Restarted Cialis  Tolerating very well  After 3 weeks he has a significant improvement in the symptoms  Wakes up early 1-2 time at nighttime  Day a frequency is better  Stream is stronger  No incontinence  No side effect of Cialis  A tolerating medication without side effect        A Cologuard 2021 Negative  2022:  Urinalysis unremarkable  2022:  LFT normal, cholesterol 190, ,  2021:  CBC normal [place] negative  2021 cholesterol 167 triglyceride 58 , creatinine 1 32 BUN 22 GFR 52,  2020 cholesterol 173 triglyceride 87  creatinine 1 47 rest of the BMP normal GFR 46  2020 cholesterol 177  creatinine 1 49 GFR 49 rest of the CMP normal 2 1, vitamin-D 47 6, TH 47, magnesium  2020 cholesterol 193  HDL 55 creatinine 1 31 rest of the CMP normal GFR 53  2021:  BUN 19 creatinine 1 47 GFR 46 rest of the BMP normal  September 2020:  CBC was normal with hemoglobin 14 3      9-: stress test:JUAN JOSE PROTOCOL:  ECG conclusions: The stress ECG was negative for ischemia      IMPRESSIONS: Normal study after maximal exercise without reproduction of symptoms  The following portions of the patient's history were reviewed and updated as appropriate: allergies, current medications, past family history, past medical history, past social history, past surgical history and problem list     Review of Systems   All other systems reviewed and are negative  Historical Information   Patient Active Problem List   Diagnosis    Stage 3a chronic kidney disease ([facility] [address] )    Nephrolithiasis    Anemia    Heart burn    BPH (benign prostatic hyperplasia)    Shin esophagus    Hypercholesteremia    Irregular heart beat    Elevated blood pressure reading    Abnormal EKG    Rash    Platelets decreased (HCC)    Herpes    Vitamin D insufficiency    Renal cyst     Past Medical History:   Diagnosis Date    BPH (benign prostatic hyperplasia)     GERD (gastroesophageal reflux disease)     Herpes      History reviewed  No pertinent surgical history    Social History     Substance and Sexual Activity   Alcohol Use Yes    Alcohol/week: 1 0 - 2 0 standard drink    Types: 1 - 2 Glasses of wine per week    Comment: occas     Social History     Substance and Sexual Activity   Drug Use No     Social History     Tobacco Use   Smoking Status Never Smoker   Smokeless Tobacco Never Used     Family History   Problem Relation Age of Onset    No Known Problems Mother     No Known Problems Father      Health Maintenance Due   Topic    Pneumococcal Vaccine: 65+ Years (1 of 1 - PPSV23)      Meds/Allergies       Current Outpatient Medications:     aspirin (ECOTRIN LOW STRENGTH) 81 mg EC tablet, Take 81 mg by mouth daily, Disp: , Rfl:     cholecalciferol (VITAMIN D3) 1,000 units tablet, Take 1,000 Units by mouth daily, Disp: , Rfl:     famotidine (PEPCID) 40 MG tablet, Take 1 tablet (40 mg total) by mouth daily, Disp: 90 tablet, Rfl: 1    pravastatin (PRAVACHOL) 10 mg tablet, Take 1 tablet (10 mg total) by mouth daily, Disp: 90 tablet, Rfl: 3    tadalafil (CIALIS) 5 MG tablet, Take 1 tablet (5 mg total) by mouth daily as needed for erectile dysfunction, Disp: 90 tablet, Rfl: 1    vitamin B-12 (VITAMIN B-12) 1,000 mcg tablet, Take 1,000 mcg by mouth daily, Disp: , Rfl:     tadalafil (CIALIS) 5 MG tablet, Take 1 tablet (5 mg total) by mouth daily, Disp: 30 tablet, Rfl: 1      Objective:    Vitals:   /82   Pulse 62   Ht 5' 9" (1 753 m)   Wt 66 2 kg (146 lb)   SpO2 98%   BMI 21 56 kg/m²   Body mass index is 21 56 kg/m²  Vitals:    03/25/22 1048   Weight: 66 2 kg (146 lb)       Physical Exam  Vitals and nursing note reviewed  Constitutional:       Appearance: He is well-developed  HENT:      Head: Normocephalic and atraumatic  Eyes:      Conjunctiva/sclera: Conjunctivae normal    Neck:      Thyroid: No thyromegaly  Vascular: No JVD  Cardiovascular:      Rate and Rhythm: Regular rhythm  Heart sounds: Normal heart sounds  Pulmonary:      Effort: No respiratory distress  Breath sounds: Normal breath sounds  No wheezing or rales  Abdominal:      General: Bowel sounds are normal  There is no distension  Palpations: There is no mass  Tenderness: There is no abdominal tenderness  There is no right CVA tenderness or rebound  Musculoskeletal:      Right lower leg: No edema  Left lower leg: No edema  Lymphadenopathy:      Cervical: No cervical adenopathy  Skin:     General: Skin is warm  Coloration: Skin is not jaundiced or pale  Findings: No rash       Going for EGD and colonoscopy    Lab Review   Orders Only on 02/24/2022   Component Date Value Ref Range Status    Color, UA 03/04/2022 Light Yellow   Final    Clarity, UA 03/04/2022 Clear   Final    Specific Gravity, UA 03/04/2022 1 019  1 003 - 1 030 Final    pH, UA 03/04/2022 6 0  4 5, 5 0, 5 5, 6 0, 6 5, 7 0, 7 5, 8 0 Final    Leukocytes, UA 03/04/2022 Negative  Negative Final    Nitrite, UA 03/04/2022 Negative  Negative Final    Protein, UA 03/04/2022 Negative  Negative mg/dl Final    Glucose, UA 03/04/2022 Negative  Negative mg/dl Final    Ketones, UA 03/04/2022 Negative  Negative mg/dl Final    Urobilinogen, UA 03/04/2022 <2 0  <2 0 mg/dl mg/dl Final    Bilirubin, UA 03/04/2022 Negative  Negative Final    Blood, UA 03/04/2022 Negative  Negative Final         Patient Instructions       Medicare Preventive Visit Patient Instructions  Thank you for completing your Welcome to Medicare Visit or Medicare Annual Wellness Visit today  Your next wellness visit will be due in one year (3/26/2023)  The screening/preventive services that you may require over the next 5-10 years are detailed below  Some tests may not apply to you based off risk factors and/or age  Screening tests ordered at today's visit but not completed yet may show as past due  Also, please note that scanned in results may not display below  Preventive Screenings:  Service Recommendations Previous Testing/Comments   Colorectal Cancer Screening  · Colonoscopy    · Fecal Occult Blood Test (FOBT)/Fecal Immunochemical Test (FIT)  · Fecal DNA/Cologuard Test  · Flexible Sigmoidoscopy Age: 54-65 years old   Colonoscopy: every 10 years (May be performed more frequently if at higher risk)  OR  FOBT/FIT: every 1 year  OR  Cologuard: every 3 years  OR  Sigmoidoscopy: every 5 years  Screening may be recommended earlier than age 48 if at higher risk for colorectal cancer  Also, an individualized decision between you and your healthcare provider will decide whether screening between the ages of 74-80 would be appropriate   Colonoscopy: 02/17/2016  FOBT/FIT: Not on file  Cologuard: 06/08/2021  Sigmoidoscopy: Not on file          Prostate Cancer Screening Individualized decision between patient and health care provider in men between ages of 53-78   Medicare will cover every 12 months beginning on the day after your 50th birthday PSA: 1 2 ng/mL           Hepatitis C Screening Once for adults born between 1945 and 1965  More frequently in patients at high risk for Hepatitis C Hep C Antibody: 06/14/2021        Diabetes Screening 1-2 times per year if you're at risk for diabetes or have pre-diabetes Fasting glucose: 84 mg/dL   A1C: No results in last 5 years        Cholesterol Screening Once every 5 years if you don't have a lipid disorder  May order more often based on risk factors  Lipid panel: 01/13/2022           Other Preventive Screenings Covered by Medicare:  1  Abdominal Aortic Aneurysm (AAA) Screening: covered once if your at risk  You're considered to be at risk if you have a family history of AAA or a male between the age of 73-68 who smoking at least 100 cigarettes in your lifetime  2  Lung Cancer Screening: covers low dose CT scan once per year if you meet all of the following conditions: (1) Age 50-69; (2) No signs or symptoms of lung cancer; (3) Current smoker or have quit smoking within the last 15 years; (4) You have a tobacco smoking history of at least 30 pack years (packs per day x number of years you smoked); (5) You get a written order from a healthcare provider  3  Glaucoma Screening: covered annually if you're considered high risk: (1) You have diabetes OR (2) Family history of glaucoma OR (3)  aged 48 and older OR (3)  American aged 72 and older  3  Osteoporosis Screening: covered every 2 years if you meet one of the following conditions: (1) Have a vertebral abnormality; (2) On glucocorticoid therapy for more than 3 months; (3) Have primary hyperparathyroidism; (4) On osteoporosis medications and need to assess response to drug therapy  5  HIV Screening: covered annually if you're between the age of 12-76   Also covered annually if you are younger than 13 and older than 72 with risk factors for HIV infection  For pregnant patients, it is covered up to 3 times per pregnancy  Immunizations:  Immunization Recommendations   Influenza Vaccine Annual influenza vaccination during flu season is recommended for all persons aged >= 6 months who do not have contraindications   Pneumococcal Vaccine (Prevnar and Pneumovax)  * Prevnar = PCV13  * Pneumovax = PPSV23 Adults 25-60 years old: 1-3 doses may be recommended based on certain risk factors  Adults 72 years old: Prevnar (PCV13) vaccine recommended followed by Pneumovax (PPSV23) vaccine  If already received PPSV23 since turning 65, then PCV13 recommended at least one year after PPSV23 dose  Hepatitis B Vaccine 3 dose series if at intermediate or high risk (ex: diabetes, end stage renal disease, liver disease)   Tetanus (Td) Vaccine - COST NOT COVERED BY MEDICARE PART B Following completion of primary series, a booster dose should be given every 10 years to maintain immunity against tetanus  Td may also be given as tetanus wound prophylaxis  Tdap Vaccine - COST NOT COVERED BY MEDICARE PART B Recommended at least once for all adults  For pregnant patients, recommended with each pregnancy  Shingles Vaccine (Shingrix) - COST NOT COVERED BY MEDICARE PART B  2 shot series recommended in those aged 48 and above     Health Maintenance Due:      Topic Date Due    Hepatitis C Screening  Completed     Immunizations Due:      Topic Date Due    Pneumococcal Vaccine: 65+ Years (1 of 1 - PPSV23) Never done     Advance Directives   What are advance directives? Advance directives are legal documents that state your wishes and plans for medical care  These plans are made ahead of time in case you lose your ability to make decisions for yourself  Advance directives can apply to any medical decision, such as the treatments you want, and if you want to donate organs  What are the types of advance directives? There are many types of advance directives, and each state has rules about how to use them  You may choose a combination of any of the following:  · Living will: This is a written record of the treatment you want  You can also choose which treatments you do not want, which to limit, and which to stop at a certain time  This includes surgery, medicine, IV fluid, and tube feedings  · Durable power of  for healthcare [facility]): This is a written record that states who you want to make healthcare choices for you when you are unable to make them for yourself  This person, called a proxy, is usually a family member or a friend  You may choose more than 1 proxy  · Do not resuscitate (DNR) order:  A DNR order is used in case your heart stops beating or you stop breathing  It is a request not to have certain forms of treatment, such as CPR  A DNR order may be included in other types of advance directives  · Medical directive: This covers the care that you want if you are in a coma, near death, or unable to make decisions for yourself  You can list the treatments you want for each condition  Treatment may include pain medicine, surgery, blood transfusions, dialysis, IV or tube feedings, and a ventilator (breathing machine)  · Values history: This document has questions about your views, beliefs, and how you feel and think about life  This information can help others choose the care that you would choose  Why are advance directives important? An advance directive helps you control your care  Although spoken wishes may be used, it is better to have your wishes written down  Spoken wishes can be misunderstood, or not followed  Treatments may be given even if you do not want them  An advance directive may make it easier for your family to make difficult choices about your care        © Copyright [employer] 2018 Information is for End User's use only and may not be sold, redistributed or otherwise used for commercial purposes  All illustrations and images included in CareNotes® are the copyrighted property of [employer]  or [facility] Torrey Ji       Follow with Consultants as per their and our suggestion    Follow up in 12 week(s) or as needed earlier    Follow all instructions as advised and discussed  Take your medications as prescribed  Call the office immediately if you experience any side effects  Ask questions if you do not understand  Keep your scheduled appointment as advised or come sooner if necessary or in doubt  Best time to call for non-urgent matter or questions on weekdays is between 9am and 12 noon  See physician for any new symptoms or worsening of current symptoms  Urgent or emergent situations call 911 and report to nearest emergency room  I spent  30 -40 minutes taking care of this patient including clinical care, conseling, collaboration, chart, lab and consultaion review as appropriate    Patient is to get labs 1 week(s) prior to next visit if advised               Dr Kim Anthony MD  [facility]       "This note has been constructed using a voice recognition system  Therefore there may be syntax, spelling, and/or grammatical errors   Please call if you have any questions  "

## 2022-03-25 NOTE — PROGRESS NOTES
Assessment and Plan:     Problem List Items Addressed This Visit     None      Visit Diagnoses     Medicare annual wellness visit, subsequent    -  Primary          Depression Screening and Follow-up Plan: Patient was screened for depression during today's encounter  They screened negative with a PHQ-2 score of 0  Preventive health issues were discussed with patient, and age appropriate screening tests were ordered as noted in patient's After Visit Summary  Personalized health advice and appropriate referrals for health education or preventive services given if needed, as noted in patient's After Visit Summary  History of Present Illness:     Patient presents for Medicare Annual Wellness visit    Patient Care Team:  Stanton Stephen MD as PCP - General (Internal Medicine)  MD Adan Rivero MD Arlene Das, MD     Problem List:     Patient Active Problem List   Diagnosis    Stage 3a chronic kidney disease ([address] )    Nephrolithiasis    Anemia    Heart burn    BPH (benign prostatic hyperplasia)    Shin esophagus    Hypercholesteremia    Irregular heart beat    Elevated blood pressure reading    Abnormal EKG    Rash    Platelets decreased ([address] )    Fall    Herpes    Acute knee pain    Knee pain, right    Vitamin D insufficiency    Renal cyst      Past Medical and Surgical History:     Past Medical History:   Diagnosis Date    BPH (benign prostatic hyperplasia)     GERD (gastroesophageal reflux disease)     Herpes      History reviewed  No pertinent surgical history     Family History:     Family History   Problem Relation Age of Onset    No Known Problems Mother     No Known Problems Father       Social History:     Social History     Socioeconomic History    Marital status:      Spouse name: None    Number of children: None    Years of education: None    Highest education level: None   Occupational History    None   Tobacco Use    Smoking status: Never Smoker    Smokeless tobacco: Never Used   Vaping Use    Vaping Use: Never used   Substance and Sexual Activity    Alcohol use: Yes     Alcohol/week: 1 0 - 2 0 standard drink     Types: 1 - 2 Glasses of wine per week     Comment: occas    Drug use: No    Sexual activity: None   Other Topics Concern    None   Social History Narrative    None     Social Determinants of Health     Financial Resource Strain: Not on file   Food Insecurity: Not on file   Transportation Needs: Not on file   Physical Activity: Not on file   Stress: Not on file   Social Connections: Not on file   Intimate Partner Violence: Not on file   Housing Stability: Not on file      Medications and Allergies:     Current Outpatient Medications   Medication Sig Dispense Refill    aspirin (ECOTRIN LOW STRENGTH) 81 mg EC tablet Take 81 mg by mouth daily      cholecalciferol (VITAMIN D3) 1,000 units tablet Take 1,000 Units by mouth daily      famotidine (PEPCID) 40 MG tablet Take 1 tablet (40 mg total) by mouth daily 90 tablet 1    pravastatin (PRAVACHOL) 10 mg tablet Take 1 tablet (10 mg total) by mouth daily 90 tablet 3    tadalafil (CIALIS) 5 MG tablet Take 1 tablet (5 mg total) by mouth daily as needed for erectile dysfunction 30 tablet 1    tadalafil (CIALIS) 5 MG tablet Take 1 tablet (5 mg total) by mouth daily 30 tablet 1    vitamin B-12 (VITAMIN B-12) 1,000 mcg tablet Take 1,000 mcg by mouth daily       No current facility-administered medications for this visit       No Known Allergies   Immunizations:     Immunization History   Administered Date(s) Administered    COVID-19 MODERNA VACC 0 25 ML IM BOOSTER 12/17/2021    COVID-19 MODERNA VACC 0 5 ML IM 02/05/2021, 03/05/2021    Influenza Split High Dose Preservative Free IM 10/18/2019    Influenza, high dose seasonal 0 7 mL 09/18/2020, 10/05/2021    Tdap 05/27/2021      Health Maintenance:         Topic Date Due    Hepatitis C Screening  Completed         Topic Date Due    Pneumococcal Vaccine: 65+ Years (1 of 1 - PPSV23) Never done      Medicare Health Risk Assessment:     There were no vitals taken for this visit  Rita Amado is here for his Subsequent Wellness visit  Last Medicare Wellness visit information reviewed, patient interviewed, no change since last AWV  Health Risk Assessment:   Patient rates overall health as very good  Patient feels that their physical health rating is same  Patient is satisfied with their life  Eyesight was rated as slightly worse  Hearing was rated as same  Patient feels that their emotional and mental health rating is same  Patients states they are never, rarely angry  Patient states they are sometimes unusually tired/fatigued  Pain experienced in the last 7 days has been none  Patient states that he has experienced no weight loss or gain in last 6 months  Weight stays the same  Has made some intentional changes to weight  Depression Screening:   PHQ-2 Score: 0      Fall Risk Screening: In the past year, patient has experienced: history of falling in past year    Number of falls: 1  Injured during fall?: Yes    Feels unsteady when standing or walking?: Yes    Worried about falling?: No      Home Safety:  Patient does not have trouble with stairs inside or outside of their home  Patient has working smoke alarms and has working carbon monoxide detector  Home safety hazards include: none  No home hazards  Pt feels safe  Nutrition:   Current diet is Regular  Eats balanced diet  Proteins and vegetable  Has been trying to eliminate saturated fat  Medications:   Patient is currently taking over-the-counter supplements  OTC medications include: see medication list  Patient is able to manage medications   No issues with meds    Activities of Daily Living (ADLs)/Instrumental Activities of Daily Living (IADLs):   Walk and transfer into and out of bed and chair?: Yes  Dress and groom yourself?: Yes    Bathe or shower yourself?: Yes    Feed yourself? Yes  Do your laundry/housekeeping?: Yes  Manage your money, pay your bills and track your expenses?: Yes  Make your own meals?: Yes    Do your own shopping?: Yes    ADL comments: Pt is very independent  Previous Hospitalizations:   Any hospitalizations or ED visits within the last 12 months?: No      Hospitalization Comments: [facility] conditins have been stable    Advance Care Planning:   Living will: Yes    Durable POA for healthcare: Yes    Advanced directive: Yes    Advanced directive counseling given: No    Five wishes given: No    Patient declined ACP directive: Yes    End of Life Decisions reviewed with patient: Yes    Provider agrees with end of life decisions: Yes      Comments: All docs in place per pt  Cognitive Screening:   Provider or family/friend/caregiver concerned regarding cognition?: No    PREVENTIVE SCREENINGS      Cardiovascular Screening:    General: Screening Not Indicated and History Lipid Disorder      Diabetes Screening:     General: Screening Current      Colorectal Cancer Screening:     General: Risks and Benefits Discussed    Due for: Cologuard      Prostate Cancer Screening:    General: Screening Not Indicated      Osteoporosis Screening:    General: Risks and Benefits Discussed and Screening Not Indicated      Abdominal Aortic Aneurysm (AAA) Screening:        General: Risks and Benefits Discussed and Screening Not Indicated      Lung Cancer Screening:     General: Screening Not Indicated      Hepatitis C Screening:    General: Screening Current      Preventive Screening Comments: Sees eye doc yearly  UTD with Covid vax  Gets the flu shot every fall  Consider Shingrix  Screening, Brief Intervention, and Referral to Treatment (SBIRT)    Screening  Typical number of drinks in a day: 1  Typical number of drinks in a week: 3  Interpretation: Low risk drinking behavior      AUDIT-C Screenin) How often did you have a drink containing alcohol in the past year? 2 to 4 times a month  2) How many drinks did you have on a typical day when you were drinking in the past year? 1 to 2  3) How often did you have 6 or more drinks on one occasion in the past year? never    AUDIT-C Score: 2  Interpretation: Score 0-3 (male): Negative screen for alcohol misuse    Single Item Drug Screening:  How often have you used an illegal drug (including marijuana) or a prescription medication for non-medical reasons in the past year? never    Single Item Drug Screen Score: 0  Interpretation: Negative screen for possible drug use disorder    Brief Intervention  Alcohol & drug use screenings were reviewed  No concerns regarding substance use disorder identified  Other Counseling Topics:   Skin self-exam and regular weightbearing exercise         Barbara Westbrook MD

## 2022-03-28 ENCOUNTER — TELEPHONE (OUTPATIENT)
Dept: GASTROENTEROLOGY | Facility: CLINIC | Age: 77
End: 2022-03-28

## 2022-03-28 NOTE — TELEPHONE ENCOUNTER
Received message from pt asking for a call back to see if could be scheduled with Dr Campbell Garcia on 4/6/22 for his Colon/EGD  He also provided a phone number, however, when called was incorrect, wrong number  I returned his call on home number, [email or web address] apologizing for missing him and informed that unfortunately Dr Campbell Garcia does not have any opening on 4/6/22  Pt would need to be asked ASC Assessment prior to scheduling as was not done in the office  Thank you!

## 2022-03-28 NOTE — TELEPHONE ENCOUNTER
Pre  Op  Clearance note- Cardiology    [facility] Jun Reger   67 y o   male  1945      Dr Alirio Wolff :     Patient's chart was reviewed for preop clearance  Patient was seen in our office on 22  Patient has past medical history significant for irregular heartbeat, dyslipidemia, stage 3 CKD, abnormal ekg  Patient is now scheduled for colonoscopy 22  Patient has no clinical evidence of  active heart failure or  active ischemia or active arrhythmia  Patient's last cardiac workup including echo, Holter and stress test done in  reports were reviewed and it shows normal LV systolic function mild valvular disease and exercise stress test is normal       In my opinion patient is in optimum condition for the procedure as planned  Patient is low risk for the surgery as planned from cardiac point of view  Continue current cardiac medications  Patient can hold  Aspirin for  5-7 days as required for surgery  Patient can hold Plavix for 5 days  Patient can hold Eliquis/Xarelto/Pradaxa for 3 days before the procedure  Please restart after the procedure  immediately or next day if no contraindication form surgical point of view and advise patient to contact our office  If you have any question please do not hesitate to call us at our office of [facility]    Phone # [phone]        Lab Results   Component Value Date    WBC 7 48 2021    HGB 13 6 2021    HCT 40 8 2021    MCV 92 2021     2021     Lab Results   Component Value Date    CREATININE 1 28 10/05/2021     Lab Results   Component Value Date    GLUF 84 10/05/2021       Cardiac testing:   Results for orders placed during the hospital encounter of 19    Echo complete with contrast if indicated    Narrative  [address]  [address] [facility]  [place] [address]  [phone]    Transthoracic Echocardiogram  2D, M-mode, Doppler, and Color Doppler    Study date:  24-May-2019    Patient: Marjan Macias  MR number: [id number]  Account number: [de-identified]  : 1945  Age: 75 years  Gender: Male  Status: Outpatient  Location: Echo lab  Height: 70 in  Weight: 149 6 lb  BP: 110/ 70 mmHg    Indications: CVA    Diagnoses: I67 9 - Cerebrovascular disease, unspecified    Sonographer:  JELANI Moody  Primary Physician: Sherre Scheuermann, MD  Referring Physician: Sherre Scheuermann, MD  Group:  [facility]  Interpreting Physician:  Fer Guan MD    SUMMARY    LEFT VENTRICLE:  Systolic function was normal  Ejection fraction was estimated in the range of 55 % to 60 % to be 60 %  There were no regional wall motion abnormalities  Doppler parameters were consistent with abnormal left ventricular relaxation (grade 1 diastolic dysfunction)  LEFT ATRIUM:  The atrium was moderately dilated  RIGHT ATRIUM:  The atrium was mildly dilated  MITRAL VALVE:  There was trace regurgitation  AORTIC VALVE:  There was trace regurgitation  TRICUSPID VALVE:  There was trace regurgitation  The tricuspid jet envelope definition was inadequate for estimation of RV systolic pressure  There are no indirect findings (abnormal RV volume or geometry, altered pulmonary flow velocity profile, or leftward septal displacement) which  would suggest moderate or severe pulmonary hypertension  HISTORY: PRIOR HISTORY: CKD, BPH, GERD, Herpes    PROCEDURE: The procedure was performed in the echo lab  This was a routine study  The transthoracic approach was used  The study included complete 2D imaging, M-mode, complete spectral Doppler, and color Doppler  The heart rate was 68 bpm,  at the start of the study  Image quality was adequate  LEFT VENTRICLE: Size was normal  Systolic function was normal  Ejection fraction was estimated in the range of 55 % to 60 % to be 60 %  There were no regional wall motion abnormalities   Wall thickness was normal  No evidence of apical  thrombus  DOPPLER: Doppler parameters were consistent with abnormal left ventricular relaxation (grade 1 diastolic dysfunction)  RIGHT VENTRICLE: The size was normal  Systolic function was normal  Wall thickness was normal     LEFT ATRIUM: The atrium was moderately dilated  RIGHT ATRIUM: The atrium was mildly dilated  MITRAL VALVE: There was mild thickening  There was normal leaflet separation  DOPPLER: The transmitral velocity was within the normal range  There was no evidence for stenosis  There was trace regurgitation  AORTIC VALVE: The valve was trileaflet  Leaflets exhibited mildly increased thickness, mild calcification, and normal cuspal separation  DOPPLER: Transaortic velocity was within the normal range  There was no evidence for stenosis  There  was trace regurgitation  TRICUSPID VALVE: The valve structure was normal  There was normal leaflet separation  DOPPLER: The transtricuspid velocity was within the normal range  There was no evidence for stenosis  There was trace regurgitation  The tricuspid jet  envelope definition was inadequate for estimation of RV systolic pressure  There are no indirect findings (abnormal RV volume or geometry, altered pulmonary flow velocity profile, or leftward septal displacement) which would suggest  moderate or severe pulmonary hypertension  PULMONIC VALVE: Leaflets exhibited normal thickness, no calcification, and normal cuspal separation  DOPPLER: The transpulmonic velocity was within the normal range  There was no significant regurgitation  PERICARDIUM: There was no pericardial effusion  The pericardium was normal in appearance  AORTA: The root exhibited normal size  SYSTEMIC VEINS: IVC: The inferior vena cava was normal in size      SYSTEM MEASUREMENT TABLES    2D mode  AoR Diam 2D: 3 6 cm  LA Diam (2D): 4 5 cm  LA/Ao (2D): 1 25  FS (2D Teich): 28 2 %  IVSd (2D): 0 96 cm  LVDEV: 94 4 cmï¾³  LVESV: 42 8 cmï¾³  LVIDd(2D): 4 54 cm  LVISd (2D): 3 26 cm  LVPWd (2D): 1 cm  SV (Teich): 51 6 cmï¾³    Apical four chamber  LVEF A4C: 62 %    Unspecified Scan Mode  MV Peak A Jason: 843 mm/s  MV Peak E Jason  Mean: 747 mm/s  MVA (PHT): 3 38 cmï¾²  PHT: 65 ms  Max P mm[Hg]  V Max: 2170 mm/s  Vmax: 2370 mm/s  RA Area: 18 cmï¾²  RA Volume: 51 7 cmï¾³  TAPSE: 2 2 cm    [facility]redited [facility]    Prepared and electronically signed by    Farzaneh Shoemaker MD  Signed 26-May-2019 16:36:08    No results found for this or any previous visit  No results found for this or any previous visit  No results found for this or any previous visit  No results found for this or any previous visit        William Garrett MD [facility]  3/28/2022  1:14 PM

## 2022-03-28 NOTE — TELEPHONE ENCOUNTER
Patient is scheduled for a colonoscopy with Dr Denise Gimenez on 4/13/22 at [address]  Is patient cleared to have procedure? At [facility]? Under MAC anesthesia?     Please Advise  Thank you

## 2022-03-28 NOTE — TELEPHONE ENCOUNTER
Patient does not need OA, he is a patient and just saw Dr Phillip Montenegro  He is scheduled for a Flip on 4/12/22 with Dr Phillip Glover was given instructions for PREP at his OV

## 2022-03-28 NOTE — TELEPHONE ENCOUNTER
[facility] Assessment    Name: Liset Cardenas  YOB: 1945  Last Height: 5' 9" (1 753 m)  Last weight: 66 2 kg (146 lb)  BMI: 21 56 kg/m²  Procedure: Colonoscopy/EGD  Diagnosis: Hx of Polyps/Shin's  Date of procedure: 4/12/22  Prep: Elpidio/Dul/Mag  Responsible : yes  Phone#: ?  Name completing form: Escobar Shay  Date form completed: 03/28/22    If the patient answers yes to any of these questions, schedule in a hospital  Are you pregnant: No  Do you rely on a wheelchair for mobility: No  Have you been diagnosed with End Stage Renal Disease (ESRD): No  Do you need oxygen during the day: No  Have you had a heart attack or stroke within the past three months: No  Have you had a seizure within the past three months: No  Have you ever been informed by anesthesia that you have a difficult airway: No  Additional Questions  Have you had any cardiac testing or are under the care of a Cardiologist (see cardiac list): Yes (Comment: Obtain Cardiac Clearance)  Cardiac list:   Do you have an implanted cardiac defibrillator: No (Comment: This patient should be scheduled in the hospital)    Have any bleeding problems, such as anemia or hemophilia (If patient has H&H result below 8, schedule in hospital   H&H must be within 30 days of procedure): No    Had an organ transplant within the past 3 months: No    Do you have any present infections: No  Do you get short of breath when walking a few blocks: No  Have you been diagnosed with diabetes: No  Comments (provide cardiac provider information if applicable): Dr Karlee Cummins

## 2022-04-13 ENCOUNTER — ANESTHESIA (OUTPATIENT)
Dept: GASTROENTEROLOGY | Facility: AMBULATORY SURGERY CENTER | Age: 77
End: 2022-04-13

## 2022-04-13 ENCOUNTER — ANESTHESIA EVENT (OUTPATIENT)
Dept: GASTROENTEROLOGY | Facility: AMBULATORY SURGERY CENTER | Age: 77
End: 2022-04-13

## 2022-04-13 ENCOUNTER — HOSPITAL ENCOUNTER (OUTPATIENT)
Dept: GASTROENTEROLOGY | Facility: AMBULATORY SURGERY CENTER | Age: 77
Discharge: HOME | End: 2022-04-13
Payer: MEDICARE

## 2022-04-13 VITALS
SYSTOLIC BLOOD PRESSURE: 115 MMHG | OXYGEN SATURATION: 97 % | DIASTOLIC BLOOD PRESSURE: 75 MMHG | BODY MASS INDEX: 21.62 KG/M2 | WEIGHT: 146 LBS | HEIGHT: 69 IN | HEART RATE: 65 BPM | RESPIRATION RATE: 18 BRPM | TEMPERATURE: 97.5 F

## 2022-04-13 DIAGNOSIS — K21.9 GASTROESOPHAGEAL REFLUX DISEASE WITHOUT ESOPHAGITIS: ICD-10-CM

## 2022-04-13 DIAGNOSIS — K63.5 HYPERPLASTIC COLONIC POLYP, UNSPECIFIED PART OF COLON: ICD-10-CM

## 2022-04-13 DIAGNOSIS — K22.70 BARRETT'S ESOPHAGUS WITHOUT DYSPLASIA: ICD-10-CM

## 2022-04-13 PROCEDURE — 45385 COLONOSCOPY W/LESION REMOVAL: CPT | Performed by: INTERNAL MEDICINE

## 2022-04-13 PROCEDURE — 88305 TISSUE EXAM BY PATHOLOGIST: CPT | Performed by: PATHOLOGY

## 2022-04-13 PROCEDURE — 99100 ANES PT EXTEME AGE<1 YR&>70: CPT | Performed by: NURSE ANESTHETIST, CERTIFIED REGISTERED

## 2022-04-13 PROCEDURE — 43239 EGD BIOPSY SINGLE/MULTIPLE: CPT | Performed by: INTERNAL MEDICINE

## 2022-04-13 PROCEDURE — 00813 ANES UPR LWR GI NDSC PX: CPT | Performed by: NURSE ANESTHETIST, CERTIFIED REGISTERED

## 2022-04-13 RX ORDER — SODIUM CHLORIDE 9 MG/ML
30 INJECTION, SOLUTION INTRAVENOUS CONTINUOUS
Status: DISCONTINUED | OUTPATIENT
Start: 2022-04-13 | End: 2022-04-17 | Stop reason: HOSPADM

## 2022-04-13 RX ORDER — PROPOFOL 10 MG/ML
INJECTION, EMULSION INTRAVENOUS AS NEEDED
Status: DISCONTINUED | OUTPATIENT
Start: 2022-04-13 | End: 2022-04-13

## 2022-04-13 RX ORDER — SODIUM CHLORIDE 9 MG/ML
20 INJECTION, SOLUTION INTRAVENOUS CONTINUOUS
Status: DISCONTINUED | OUTPATIENT
Start: 2022-04-13 | End: 2022-04-17 | Stop reason: HOSPADM

## 2022-04-13 RX ORDER — LIDOCAINE HYDROCHLORIDE 20 MG/ML
INJECTION, SOLUTION EPIDURAL; INFILTRATION; INTRACAUDAL; PERINEURAL AS NEEDED
Status: DISCONTINUED | OUTPATIENT
Start: 2022-04-13 | End: 2022-04-13

## 2022-04-13 RX ADMIN — LIDOCAINE HYDROCHLORIDE 50 MG: 20 INJECTION, SOLUTION EPIDURAL; INFILTRATION; INTRACAUDAL; PERINEURAL at 13:53

## 2022-04-13 RX ADMIN — PROPOFOL 50 MG: 10 INJECTION, EMULSION INTRAVENOUS at 14:09

## 2022-04-13 RX ADMIN — PROPOFOL 130 MG: 10 INJECTION, EMULSION INTRAVENOUS at 13:53

## 2022-04-13 RX ADMIN — PROPOFOL 20 MG: 10 INJECTION, EMULSION INTRAVENOUS at 13:55

## 2022-04-13 RX ADMIN — PROPOFOL 50 MG: 10 INJECTION, EMULSION INTRAVENOUS at 14:14

## 2022-04-13 RX ADMIN — SODIUM CHLORIDE: 9 INJECTION, SOLUTION INTRAVENOUS at 13:50

## 2022-04-13 RX ADMIN — PROPOFOL 50 MG: 10 INJECTION, EMULSION INTRAVENOUS at 14:17

## 2022-04-13 RX ADMIN — LIDOCAINE HYDROCHLORIDE 50 MG: 20 INJECTION, SOLUTION EPIDURAL; INFILTRATION; INTRACAUDAL; PERINEURAL at 14:02

## 2022-04-13 RX ADMIN — PROPOFOL 50 MG: 10 INJECTION, EMULSION INTRAVENOUS at 14:01

## 2022-04-13 RX ADMIN — SODIUM CHLORIDE: 9 INJECTION, SOLUTION INTRAVENOUS at 14:06

## 2022-04-13 RX ADMIN — PROPOFOL 50 MG: 10 INJECTION, EMULSION INTRAVENOUS at 14:20

## 2022-04-13 RX ADMIN — PROPOFOL 50 MG: 10 INJECTION, EMULSION INTRAVENOUS at 14:03

## 2022-04-13 RX ADMIN — PROPOFOL 50 MG: 10 INJECTION, EMULSION INTRAVENOUS at 13:57

## 2022-04-13 RX ADMIN — PROPOFOL 50 MG: 10 INJECTION, EMULSION INTRAVENOUS at 14:26

## 2022-04-13 NOTE — DISCHARGE INSTRUCTIONS
Upper Endoscopy and Colonoscopy   WHAT YOU NEED TO KNOW:   An upper endoscopy is also called an upper gastrointestinal (GI) endoscopy, or an esophagogastroduodenoscopy (EGD)  It is a procedure to examine the inside of your esophagus, stomach, and duodenum (first part of the small intestine) with a scope  You may feel bloated, gassy, or have some abdominal discomfort after your procedure  Your throat may be sore for 24 to 36 hours  You may burp or pass gas from air that is still inside your body  A colonoscopy is a procedure to examine the inside of your colon (intestine) with a scope  Polyps or tissue growths may have been removed during your colonoscopy  It is normal to feel bloated and to have some abdominal discomfort  You should be passing gas  If you have hemorrhoids or you had polyps removed, you may have a small amount of bleeding  DISCHARGE INSTRUCTIONS:   Seek care immediately if:   · You have sudden, severe abdominal pain  · You have problems swallowing  · You have a large amount of black, sticky bowel movements or blood in your bowel movements  · You have sudden trouble breathing  · You feel weak, lightheaded, or faint or your heart beats faster than normal for you  Contact your healthcare provider if:   · You have a fever and chills  · You have nausea or are vomiting  · Your abdomen is bloated or feels full and hard  · You have abdominal pain  · You have a large amount of black, sticky bowel movements or blood in your bowel movements  · You have not had a bowel movement for 3 days after your procedure  · You have rash or hives  · You have questions or concerns about your procedure  Activity:   ·       Do not lift, strain, or run for 24 hours after your procedure  ·       Rest after your procedure  You have been given medicine to relax you  Do not drive or make important decisions until the day after your procedure   Return to your normal activity as directed  ·       Relieve gas and discomfort from bloating by lying on your right side with a heating pad on your abdomen  You may need to take short walks to help the gas move out  Eat small meals until bloating is relieved  Follow up with your healthcare provider as directed: Write down your questions so you remember to ask them during your visits  If you take a blood thinner, please review the specific instructions from your endoscopist about when you should resume it  These can be found in the Recommendation and Your Medication list sections of this After Visit Summary  Hemorrhoids   AMBULATORY CARE:   Hemorrhoids  are swollen blood vessels inside your rectum (internal hemorrhoids) or on your anus (external hemorrhoids)  Sometimes a hemorrhoid may prolapse  This means it extends out of your anus  Common symptoms include the following:   · Pain or itching around your anus or inside your rectum    · Swelling or bumps around your anus    · Bright red blood in your bowel movement, on the toilet paper, or in the toilet bowl    · Tissue bulging out of your anus (prolapsed hemorrhoids)    · Incontinence (poor control over urine or bowel movements)    Seek care immediately if:   · You have severe pain in your rectum or around your anus  · You have severe pain in your abdomen and you are vomiting  · You have bleeding from your anus that soaks through your underwear  Contact your healthcare provider if:   · You have frequent and painful bowel movements  · Your hemorrhoid looks or feels more swollen than usual      · You do not have a bowel movement for 2 days or more  · You see or feel tissue coming through your anus  · You have questions or concerns about your condition or care  Treatment for hemorrhoids  may include medicines to decrease pain, swelling, and itching  The medicine may be a pill, pad, cream, or ointment   Medicine may also be given to soften your bowel movement  Surgery and other procedures may be needed to shrink or remove your hemorrhoids  Manage your symptoms:   · Apply ice on your anus for 15 to 20 minutes every hour or as directed  Use an ice pack, or put crushed ice in a plastic bag  Cover it with a towel before you apply it to your anus  Ice helps prevent tissue damage and decreases swelling and pain  · Take a sitz bath  Fill a bathtub with 4 to 6 inches of warm water  You may also use a sitz bath pan that fits inside a toilet bowl  Sit in the sitz bath for 15 minutes  Do this 3 times a day, and after each bowel movement  The warm water can help decrease pain and swelling  · Keep your anal area clean  Gently wash the area with warm water daily  Soap may irritate the area  After a bowel movement, wipe with moist towelettes or wet toilet paper  Dry toilet paper can irritate the area  Prevent hemorrhoids:   · Do not strain to have a bowel movement  Do not sit on the toilet too long  These actions can increase pressure on the tissues in your rectum and anus  · Drink plenty of liquids  Liquids can help prevent constipation  Ask how much liquid to drink each day and which liquids are best for you  · Eat a variety of high-fiber foods  Examples include fruits, vegetables, and whole grains  Ask your healthcare provider how much fiber you need each day  You may need to take a fiber supplement  · Exercise as directed  Exercise, such as walking, may make it easier to have a bowel movement  Ask your healthcare provider to help you create an exercise plan  · Do not have anal sex  Anal sex can weaken the skin around your rectum and anus  · Avoid heavy lifting  This can cause straining and increase your risk for another hemorrhoid  Follow up with your doctor as directed:  Write down your questions so you remember to ask them during your visits     © Copyright [employer] 2022 Information is for End User's use only and may not be sold, redistributed or otherwise used for commercial purposes  All illustrations and images included in CareNotes® are the copyrighted property of A D A M , Inc  or Paige Agudelo  The above information is an  only  It is not intended as medical advice for individual conditions or treatments  Talk to your doctor, nurse or pharmacist before following any medical regimen to see if it is safe and effective for you  Diverticulosis   WHAT YOU NEED TO KNOW:   What is diverticulosis? Diverticulosis is a condition that causes small pockets called diverticula to form in your intestine  These pockets make it difficult for bowel movements to pass through your digestive system  What causes diverticulosis? Diverticula form when muscles have to work hard to move bowel movements through the intestine  The force causes bulges to form at weak areas in the intestine  This may happen if you eat foods that are low in fiber  Fiber helps give your bowel movements more bulk so they are larger and easier to move through your colon  The following may increase your risk of diverticulosis:  · A history of constipation    · Age 36 or older    · Obesity    · Lack of exercise    What are the signs and symptoms of diverticulosis? Diverticulosis usually does not cause any signs or symptoms  It may cause any of the following in some people:  · Pain or discomfort in your lower abdomen    · Abdominal bloating    · Constipation or diarrhea    How is diverticulosis diagnosed? Your healthcare provider will examine you and ask about your bowel movements, diet, and symptoms  He or she will also ask about any medical conditions you have or medicines you take  You may need any of the following:  · Blood tests  may be done to check for signs of inflammation  · A barium enema  is an x-ray of your colon that may show diverticula   A tube is put into your anus, and a liquid called barium is put through the tube  Barium is used so that healthcare providers can see your colon more clearly  · Flexible sigmoidoscopy  is a test to look for any changes in your lower intestines and rectum  It may also show the cause of any bleeding or pain  A soft, bendable tube with a light on the end will be put into your anus  It will then be moved forward into your intestine  · A colonoscopy  is used to look at your whole colon  A scope (long bendable tube with a light on the end) is used to take pictures  This test may show diverticula  · A CT scan , or CAT scan, may show diverticula  You may be given contrast liquid before the scan  Tell the healthcare provider if you have ever had an allergic reaction to contrast liquid  How is diverticulosis managed? The goal of treatment is to manage any symptoms you have and prevent other problems such as diverticulitis  Diverticulitis is swelling or infection of the diverticula  Your healthcare provider may recommend any of the following:  · Eat a variety of high-fiber foods  High-fiber foods help you have regular bowel movements  High-fiber foods include cooked beans, fruits, vegetables, and some cereals  Most adults need 25 to 35 grams of fiber each day  Your healthcare provider may recommend that you have more  Ask your healthcare provider how much fiber you need  Increase fiber slowly  You may have abdominal discomfort, bloating, and gas if you add fiber to your diet too quickly  You may need to take a fiber supplement if you are not getting enough fiber from food  · Medicines  to soften your bowel movements may be given  You may also need medicines to treat symptoms such as bloating and pain  · Drink liquids as directed  You may need to drink 2 to 3 liters (8 to 12 cups) of liquids every day  Ask your healthcare provider how much liquid to drink each day and which liquids are best for you      · Apply heat  on your abdomen for 20 to 30 minutes every 2 hours for as many days as directed  Heat helps decrease pain and muscle spasms  How can I help prevent diverticulitis or other symptoms? The following may help decrease your risk for diverticulitis or symptoms, such as bleeding  Talk to your provider about these or other things you can do to prevent problems that may occur with diverticulosis  · Exercise regularly  Ask your healthcare provider about the best exercise plan for you  Exercise can help you have regular bowel movements  Get 30 minutes of exercise on most days of the week  · Maintain a healthy weight  Ask your healthcare provider what a healthy weight is for you  Ask him or her to help you create a weight loss plan if you are overweight  · Do not smoke  Nicotine and other chemicals in cigarettes increase your risk for diverticulitis  Ask your healthcare provider for information if you currently smoke and need help to quit  E-cigarettes or smokeless tobacco still contain nicotine  Talk to your healthcare provider before you use these products  · Ask your healthcare provider if it is safe to take NSAIDs  NSAIDs may increase your risk of diverticulitis  When should I seek immediate care? · You have severe pain on the left side of your lower abdomen  · Your bowel movements are bright or dark red  When should I call my doctor? · You have a fever and chills  · You feel dizzy or lightheaded  · You have nausea, or you are vomiting  · You have a change in your bowel movements  · You have questions or concerns about your condition or care  CARE AGREEMENT:   You have the right to help plan your care  Learn about your health condition and how it may be treated  Discuss treatment options with your healthcare providers to decide what care you want to receive  You always have the right to refuse treatment  The above information is an  only  It is not intended as medical advice for individual conditions or treatments  Talk to your doctor, nurse or pharmacist before following any medical regimen to see if it is safe and effective for you  © Copyright [employer] 2022 Information is for End User's use only and may not be sold, redistributed or otherwise used for commercial purposes  All illustrations and images included in CareNotes® are the copyrighted property of [employer]  or Paige Ji   Colorectal Polyps   WHAT YOU NEED TO KNOW:   What are colorectal polyps? Colorectal polyps are small growths of tissue in the lining of the colon and rectum  Most polyps are usually benign (not cancer)  Certain types of polyps, called adenomatous polyps, may turn into cancer  What increases my risk for colorectal polyps? The exact cause of colorectal polyps is unknown  The following may increase your risk:  · Older age    · Foods high in fat and low in fiber    · Family history of polyps    · Intestinal diseases, such as Crohn disease or ulcerative colitis    · Smoking cigarettes or drinking alcohol    · Lack of physical activity, such as exercise    · Obesity    What are the signs and symptoms of colorectal polyps? · Blood in your bowel movement or bleeding from the rectum    · Change in bowel movement habits, such as diarrhea or constipation    · Abdominal pain    What do I need to know about colorectal polyp screening and diagnosis? Screening means you are checked for polyps that may be cancer, even if you do not have signs or symptoms  Screening is recommended starting at age 48 and continuing to age 76 if you are at average risk  Your healthcare provider may suggest screening starting at age 39  Screening may start before you are 45 or continue after you are 75 if your risk is high  Your provider will tell you how often to get screened  Timing depends on the type of screening and if polyps are found  Timing also depends on your age and if you are at increased risk for cancer   Screening may be recommended every 1, 2, 5, or 10 years  Your healthcare provider will need to test polyps to find out if they are cancer  Any of the following may be used to find polyps:  · A digital rectal exam  means your provider will use a finger to check for polyps  · A barium enema  is an x-ray of the colon  A tube is put into your anus, and a liquid called barium is put through the tube  Barium is used so that healthcare providers can see your colon better on the x-ray film  · A virtual colonoscopy  is a CT scan that takes pictures of the inside of your colon and rectum  A small, flexible tube is put into your rectum and air or carbon dioxide (gas) is used to expand your colon  This lets healthcare providers clearly see your colon and any polyps on a monitor  · Colonoscopy or sigmoidoscopy  are procedures to help your provider see the inside of your colon  He or she will use a flexible tube with a small light and camera on the end  During a sigmoidoscopy, your provider will only look at rectum and lower colon  During a colonoscopy, he or she will look at the full length of your colon  A small amount of tissue may be removed from your colon for tests  How are colorectal polyps treated? Small, benign polyps may not need treatment  Your healthcare provider will check the polyp over time to make sure it is not changing  Polyps that are cancer may be removed with one of the following:  · A polypectomy  is a minimally invasive procedure to remove a polyp during a colonoscopy or sigmoidoscopy  Your healthcare provider may need to remove the polyp with a laparoscope  Laparoscopy is done by inserting a small, flexible scope into incisions made on your abdomen  · Surgery  may be needed to remove large or deep polyps that cannot be removed in a polypectomy  Tissues or lymph nodes near a polyp may also be removed  This helps stop cancer from spreading  What can I do to lower my risk for colorectal polyps?    · Eat a variety of healthy foods   Healthy foods include fruit, vegetables, whole-grain breads, low-fat dairy products, beans, lean meat, and fish  Ask if you need to be on a special diet  · Maintain a healthy weight  Ask your healthcare provider what a healthy weight is for you  Ask him or her to help with a weight loss plan if you are overweight  · Exercise as directed  Begin to exercise slowly and do more as you get stronger  Talk with your healthcare provider before you start an exercise program          · Limit alcohol  Your risk for polyps increases the more you drink  · Do not smoke  Nicotine and other chemicals in cigarettes and cigars increase your risk for polyps  Ask your healthcare provider for information if you currently smoke and need help to quit  E-cigarettes or smokeless tobacco still contain nicotine  Talk to your healthcare provider before you use these products  Where can I find more information? · Royce Whittington ([facility])  [address], [place] [address]  Phone: [phone]  Web Address: Peter Doyle  [facility] gov    Call your local emergency number (911 in the [address] [facility]) if:   · You have sudden shortness of breath  · You have a fast heart rate, fast breathing, or are too dizzy to stand up  When should I seek immediate care? · You have severe abdominal pain  · You see blood in your bowel movement  When should I call my doctor? · You have a fever  · You have chills, a cough, or feel weak and achy  · You have abdominal pain that does not go away or gets worse after you take medicine  · Your abdomen is swollen  · You are losing weight without trying  · You have questions or concerns about your condition or care  CARE AGREEMENT:   You have the right to help plan your care  Learn about your health condition and how it may be treated   Discuss treatment options with your healthcare providers to decide what care you want to receive  You always have the right to refuse treatment  The above information is an  only  It is not intended as medical advice for individual conditions or treatments  Talk to your doctor, nurse or pharmacist before following any medical regimen to see if it is safe and effective for you  © Copyright [employer] 2022 Information is for End User's use only and may not be sold, redistributed or otherwise used for commercial purposes  All illustrations and images included in CareNotes® are the copyrighted property of [employer]  or [facility] Fiber Diet   AMBULATORY CARE:   A high-fiber diet  includes foods that have a high amount of fiber  Fiber is the part of fruits, vegetables, and grains that is not broken down by your body  Fiber keeps your bowel movements regular  Fiber can also help lower your cholesterol level, control blood sugar in people with diabetes, and relieve constipation  Fiber can also help you control your weight because it helps you feel full faster  Most adults should eat 25 to 35 grams of fiber each day  Talk to your dietitian or healthcare provider about the amount of fiber you need  Good sources of fiber:       · Foods with at least 4 grams of fiber per serving:      ? ? to ½ cup of high-fiber cereal (check the nutrition label on the box)    ? ½ cup of blackberries or raspberries    ? 4 dried prunes    ? 1 cooked artichoke    ? ½ cup of cooked legumes, such as lentils, or red, kidney, and sims beans    · Foods with 1 to 3 grams of fiber per serving:      ? 1 slice of whole-wheat, pumpernickel, or rye bread    ? ½ cup of cooked brown rice    ? 4 whole-wheat crackers    ? 1 cup of oatmeal    ? ½ cup of cereal with 1 to 3 grams of fiber per serving (check the nutrition label on the box)    ? 1 small piece of fruit, such as an apple, banana, pear, kiwi, or orange    ? 3 dates    ? ½ cup of canned apricots, fruit cocktail, peaches, or pears    ?  ½ cup of raw or cooked vegetables, such as carrots, cauliflower, cabbage, spinach, squash, or corn    Ways that you can increase fiber in your diet:   · Choose brown or wild rice instead of white rice  · Use whole wheat flour in recipes instead of white or all-purpose flour  · Add beans and peas to casseroles or soups  · Choose fresh fruit and vegetables with peels or skins on instead of juices  Other diet guidelines to follow:   · Add fiber to your diet slowly  You may have abdominal discomfort, bloating, and gas if you add fiber to your diet too quickly  · Drink plenty of liquids as you add fiber to your diet  You may have nausea or develop constipation if you do not drink enough water  Ask how much liquid to drink each day and which liquids are best for you  © Copyright [employer] 2022 Information is for End User's use only and may not be sold, redistributed or otherwise used for commercial purposes  All illustrations and images included in CareNotes® are the copyrighted property of A D A M , Inc  or [facility] Torrey Ji   The above information is an  only  It is not intended as medical advice for individual conditions or treatments  Talk to your doctor, nurse or pharmacist before following any medical regimen to see if it is safe and effective for you  GERD (Gastroesophageal Reflux Disease)   WHAT YOU NEED TO KNOW:   What is gastroesophageal reflux disease (GERD)? GERD is reflux that happens more than 2 times a week for a few weeks  Reflux means acid and food in your stomach back up into your esophagus  GERD can cause other health problems over time if it is not treated  What causes GERD? GERD often happens because the lower muscle (sphincter) of the esophagus does not close properly  The sphincter normally opens to let food into the stomach  It then closes to keep food and stomach acid in the stomach   If the sphincter does not close properly, stomach acid and food back up (reflux) into the esophagus  The following may increase your risk for GERD:  · Certain foods such as spicy foods, chocolate, foods that contain caffeine, peppermint, and fried foods    · Hiatal hernia    · Certain medicines such as calcium channel blockers (used to treat high blood pressure), allergy medicines, sedatives, or antidepressants    · Pregnancy, obesity, or scleroderma    · Lying down after a meal    · Drinking alcohol or smoking cigarettes    What are the signs and symptoms of GERD? · Heartburn (burning pain in your chest)    · Pain after meals that spreads to your neck, jaw, or shoulder    · Pain that gets better when you change positions    · Bitter or acid taste in your mouth    · A dry cough    · Trouble swallowing or pain with swallowing    · Hoarseness or a sore throat    · Burping or hiccups    · Feeling full soon after you start eating    How is GERD diagnosed? Your healthcare provider will ask about your symptoms and when they started  Tell him or her about other medical conditions you have, your eating habits, and your activities  You may also need any of the following:  · The amount of acid  in your esophagus and stomach may be checked  A small probe is used to check the amount  · An endoscopy  is a procedure used to look at the inside of your esophagus and stomach  An endoscope is a bendable tube with a light and camera on the end  Your healthcare provider may remove a small sample of tissue and send it to a lab for tests  · X-ray  pictures may be taken of your stomach and intestines (bowel)  You may be given a chalky liquid to drink before the pictures are taken  This liquid helps your stomach and intestines show up better on the x-rays  · Pressure and function  tests of your esophagus can help find problems such as a hiatal hernia  How is GERD treated? · Medicines  are used to decrease stomach acid   Medicine may also be used to help your lower esophageal sphincter and stomach contract (tighten) more  · Surgery  is done to wrap the upper part of the stomach around the esophageal sphincter  This will strengthen the sphincter and prevent reflux  How can I manage GERD? · Do not have foods or drinks that may increase heartburn  These include chocolate, peppermint, fried or fatty foods, drinks that contain caffeine, or carbonated drinks (soda)  Other foods include spicy foods, onions, tomatoes, and tomato-based foods  Do not have foods or drinks that can irritate your esophagus, such as citrus fruits, juices, and alcohol  · Do not eat large meals  When you eat a lot of food at one time, your stomach needs more acid to digest it  Eat 6 small meals each day instead of 3 large ones, and eat slowly  Do not eat meals 2 to 3 hours before bedtime  · Elevate the head of your bed  Place 6-inch blocks under the head of your bed frame  You may also use more than one pillow under your head and shoulders while you sleep  · Maintain a healthy weight  If you are overweight, weight loss may help relieve symptoms of GERD  · Do not smoke  Smoking weakens the lower esophageal sphincter and increases the risk of GERD  Ask your healthcare provider for information if you currently smoke and need help to quit  E-cigarettes or smokeless tobacco still contain nicotine  Talk to your healthcare provider before you use these products  · Do not put pressure on your abdomen  Pressure pushes acid up into your esophagus  Do not wear clothing that is tight around your waist  Do not bend over  Bend at the knees if you need to pick something up  Call your local emergency number (911 in the [address] [place] [address],[address]) if:   · You have severe chest pain and sudden trouble breathing  When should I seek immediate care? · You have trouble breathing after you vomit  · You have trouble swallowing, or pain with swallowing  · Your bowel movements are black, bloody, or tarry-looking      · Your vomit looks like coffee grounds or has blood in it  When should I call my doctor? · You feel full and cannot burp or vomit  · You vomit large amounts, or you vomit often  · You are losing weight without trying  · Your symptoms get worse or do not improve with treatment  · You have questions or concerns about your condition or care  CARE AGREEMENT:   You have the right to help plan your care  Learn about your health condition and how it may be treated  Discuss treatment options with your healthcare providers to decide what care you want to receive  You always have the right to refuse treatment  The above information is an  only  It is not intended as medical advice for individual conditions or treatments  Talk to your doctor, nurse or pharmacist before following any medical regimen to see if it is safe and effective for you  © Copyright [employer] 2022 Information is for End User's use only and may not be sold, redistributed or otherwise used for commercial purposes  All illustrations and images included in CareNotes® are the copyrighted property of A D A M , Inc  or [facility] Torrey Ji   Gastritis   AMBULATORY CARE:   Gastritis  is inflammation or irritation of the lining of your stomach  Common symptoms include the following:   · Stomach pain, burning, or tenderness when you press on it    · Stomach fullness or tightness    · Nausea or vomiting    · Loss of appetite, or feeling full quickly when you eat    · Bad breath    · Fatigue or feeling more tired than usual    · Heartburn    Call 911 for any of the following:   · You develop chest pain or shortness of breath  Seek care immediately if:   · You vomit blood  · You have black or bloody bowel movements  · You have severe stomach or back pain  Contact your healthcare provider if:   · You have a fever  · You have new or worsening symptoms, even after treatment      · You have questions or concerns about your condition or care  Treatment for gastritis:  Your symptoms may go away without treatment  Treatment will depend on what is causing your gastritis  Your healthcare provider may recommend changes to the medicines you take  Medicines may be given to help treat a bacterial infection or decrease stomach acid  Manage or prevent gastritis:   · Do not smoke  Nicotine and other chemicals in cigarettes and cigars can make your symptoms worse and cause lung damage  Ask your healthcare provider for information if you currently smoke and need help to quit  E-cigarettes or smokeless tobacco still contain nicotine  Talk to your healthcare provider before you use these products  · Do not drink alcohol  Alcohol can prevent healing and make your gastritis worse  Talk to your healthcare provider if you need help to stop drinking  · Do not take NSAIDs or aspirin unless directed  These and similar medicines can cause irritation  If your healthcare provider says it is okay to take NSAIDs, take them with food  · Do not eat foods that cause irritation  Foods such as oranges and salsa can cause burning or pain  Eat a variety of healthy foods  Examples include fruits (not citrus), vegetables, low-fat dairy products, beans, whole-grain breads, and lean meats and fish  Try to eat small meals, and drink water with your meals  Do not eat for at least 3 hours before you go to bed  · Find ways to relax and decrease stress  Stress can increase stomach acid and make gastritis worse  Activities such as yoga, meditation, or listening to music can help you relax  Spend time with friends, or do things you enjoy  Follow up with your healthcare provider as directed: You may need ongoing tests or treatment, or referral to a gastroenterologist  Write down your questions so you remember to ask them during your visits    © Copyright [employer] 2022 Information is for End User's use only and may not be sold, redistributed or otherwise used for commercial purposes  All illustrations and images included in CareNotes® are the copyrighted property of [employer]  or Paige Ji   The above information is an  only  It is not intended as medical advice for individual conditions or treatments  Talk to your doctor, nurse or pharmacist before following any medical regimen to see if it is safe and effective for you  Hiatal Hernia   WHAT YOU NEED TO KNOW:   What is a hiatal hernia? A hiatal hernia is a condition that causes part of your stomach to bulge through the hiatus (small opening) in your diaphragm  The part of the stomach may move up and down, or it may get trapped above the diaphragm  What increases my risk for a hiatal hernia? The exact cause of a hiatal hernia is not known  You may have been born with a large hiatus  The following may increase your risk of a hiatal hernia:  · Obesity    · Older age    · Medical conditions such as diverticulosis or esophagitis    · Previous surgery of the esophagus or stomach or trauma such as from a motor vehicle accident    What are the types of hiatal hernia? · Type I (sliding hiatal hernia): A portion of the stomach slides in and out of the hiatus  This type is the most common and usually causes gastroesophageal reflux disease (GERD)  GERD occurs when the esophageal sphincter does not close properly and causes acid reflux  The esophageal sphincter is the lower muscle of the esophagus  · Type II (paraesophageal hiatal hernia):  Type II hiatal hernia forms when a part of the stomach squeezes through the hiatus and lies next to the esophagus  · Type III (combined):  Type III hiatal hernia is a combination of a sliding and a paraesophageal hiatal hernia  · Type IV (complex paraesophageal hiatal hernia): The whole stomach, the small and large bowels, spleen, pancreas, or liver is pushed up into the chest     What are the signs and symptoms of a hiatal hernia? The most common symptom is heartburn  This usually occurs after meals and spreads to your neck, jaw, or shoulder  You may have no signs or symptoms, or you may have any of the following:  · Abdominal pain, especially in the area just above your navel    · Bitter or acid taste in your mouth    · Trouble swallowing    · Coughing or hoarseness    · Chest pain or shortness of breath that occurs after eating    · Frequent burping or hiccups    · Uncomfortable feeling of fullness after eating    How is a hiatal hernia diagnosed? · An upper GI series test  includes x-rays of your esophagus, stomach, and your small intestines  It is also called a barium swallow test  You will be given barium (a chalky liquid) to drink before the pictures are taken  This liquid helps your stomach and intestines show up better on the x-rays  An upper GI series can show if you have an ulcer, a blocked intestine, or other problems  · An endoscopy  uses a scope to see the inside of your digestive tract  A scope is a long, bendable tube with a light on the end of it  A camera may be hooked to the scope to take pictures  How is a hiatal hernia treated? Treatment depends on the type of hiatal hernia you have and on your symptoms  You may not need any treatment  You may need any of the following:  · Medicines  may be given to relieve heartburn symptoms  These medicines help to decrease or block stomach acid  You may also be given medicines that help to tighten the esophageal sphincter  · Surgery  may be done when medicines cannot control your symptoms, or other problems are present  Your healthcare provider may also suggest surgery depending on the type of hernia you have  Your healthcare provider can put your stomach back into its normal location  He or she may make the hiatus (hole) smaller and anchor your stomach in your abdomen   Fundoplication is a surgery that wraps the upper part of the stomach around the esophageal sphincter to strengthen it  How can I manage my symptoms? The following nutrition and lifestyle changes may be recommended to relieve symptoms of heartburn:     · Avoid foods that make your symptoms worse  These may include spicy foods, fruit juices, alcohol, caffeine, chocolate, and mint  · Eat several small meals during the day  Small meals give your stomach less food to digest     · Avoid lying down and bending forward after you eat  Do not eat meals 2 to 3 hours before bedtime  This decreases your risk for reflux  · Maintain a healthy weight  If you are overweight, weight loss may help relieve your symptoms  · Sleep with your head elevated  at least 6 inches  · Do not smoke  Smoking can increase your symptoms of heartburn  Call your local emergency number (911 in the [address]) if:   · You have severe chest pain and sudden trouble breathing  When should I seek immediate care? · You have severe abdominal pain  · You try to vomit but nothing comes out (retching)  · Your bowel movements are black or bloody  · Your vomit looks like coffee grounds or has blood in it  When should I call my doctor? · Your symptoms are getting worse  · You have nausea, and you are vomiting  · You are losing weight without trying  · You have questions or concerns about your condition or care  CARE AGREEMENT:   You have the right to help plan your care  Learn about your health condition and how it may be treated  Discuss treatment options with your healthcare providers to decide what care you want to receive  You always have the right to refuse treatment  The above information is an  only  It is not intended as medical advice for individual conditions or treatments  Talk to your doctor, nurse or pharmacist before following any medical regimen to see if it is safe and effective for you    © Copyright [employer] 2022 Information is for End User's use only and may not be sold, redistributed or otherwise used for commercial purposes  All illustrations and images included in CareNotes® are the copyrighted property of [employer]  or [address] [place] [address]   WHAT YOU NEED TO KNOW:   What is Shin esophagus? Shin esophagus is a condition that is also called intestinal metaplasia  Cells that line your esophagus change into cells that are like intestine cells  The change increases your risk for esophageal cancer  What increases my risk for Shin esophagus? The exact cause of Shin esophagus is not known  The following may increase your risk:  · Long-term gastroesophageal reflux disease (GERD)    · Age older than 48    · A family history of GERD, Shin esophagus, or esophageal cancer    · Obesity    · Smoking cigarettes    What are the signs and symptoms of Shni esophagus? Signs and symptoms are usually related to the signs and symptoms of GERD  You may have any of the following:  · Heartburn (burning pain in your chest)    · Pain after meals that spreads to your neck, jaw, or shoulder    · Pain that gets better when you change positions    · Bitter or acid taste in your mouth    · A dry cough    · Trouble swallowing or pain with swallowing    · Hoarseness or a sore throat    · Burping or hiccups    · Feeling full soon after you start eating    How is Shin esophagus diagnosed? An endoscopy is a procedure used to see the inside of your esophagus and stomach  A scope is a long, bendable tube with a light on the end of it  A camera may be hooked to the scope  Your healthcare provider may also take tissue samples to be tested for dysplasia (abnormal changes in your cells and tissues)  If dysplasia is found, it will be given a grade to describe the amount of change  The grade can range from negative (no dysplasia) to high-grade (a large amount)  You have a higher risk for cancer with high-grade dysplasia  How is Shin esophagus treated?   Treatment depends on the grade of dysplasia  The goal of treatment is to control your symptoms and prevent esophageal cancer  Your healthcare provider may also suggest that you make changes in the foods you eat and in your lifestyle  You may need any of the following:  · Anti-reflux medicines  help decrease the stomach acid that can irritate your esophagus and stomach  These medicines may include proton pump inhibitors (PPI) and histamine type-2 receptor (H2) blockers  You may also be given medicines to stop vomiting  · Surgery or other procedures  may be done if you have high-grade dysplasia  Abnormal cells may be killed with heat or by freezing them  Light may be used to kill abnormal cells  It is used in combination with medicines that make the abnormal cells sensitive to light  Surgery may be used to wrap the upper part of your stomach around the esophageal sphincter to strengthen it  Surgery may be needed to remove part or all of your esophagus  What can I do to manage Shin esophagus? · Do not eat foods that make your symptoms worse  Examples are chocolate, garlic, onions, spicy or fatty foods, citrus fruits (oranges), and tomato-based foods (spaghetti sauce)  Do not drink alcohol, drinks that contain caffeine, or carbonated drinks, such as soda  Ask your healthcare provider if there are other foods and drinks you should not have  · Maintain a healthy weight  If you are overweight, weight loss may help relieve symptoms  Ask your healthcare provider about safe ways to lose weight  · Do not smoke  If you smoke, it is never too late to quit  Smoking may worsen acid reflux  Ask your healthcare provider for information if you need help quitting  Where can I get support and more information? · [address]  [facility] [address]  Phone: [phone]  Web Address: http://[employer]/  org    Call your local emergency number (911 in the [address]) if:   · You have severe chest pain and shortness of breath  When should I seek immediate care? · Your bowel movements are black, bloody, or tarry  · Your vomit looks like coffee grounds or has blood in it  When should I call my doctor? · Your symptoms do not improve with treatment  · You have questions or concerns about your condition or care  CARE AGREEMENT:   You have the right to help plan your care  Learn about your health condition and how it may be treated  Discuss treatment options with your healthcare providers to decide what care you want to receive  You always have the right to refuse treatment  The above information is an  only  It is not intended as medical advice for individual conditions or treatments  Talk to your doctor, nurse or pharmacist before following any medical regimen to see if it is safe and effective for you  © Copyright [employer] 2022 Information is for End User's use only and may not be sold, redistributed or otherwise used for commercial purposes   All illustrations and images included in CareNotes® are the copyrighted property of A D A M [employer] Inc  or [address] [place] [address]

## 2022-04-13 NOTE — ANESTHESIA PREPROCEDURE EVALUATION
Procedure:  COLONOSCOPY  EGD    Relevant Problems   ANESTHESIA (within normal limits)      CARDIO   (+) Hypercholesteremia      ENDO (within normal limits)      GI/HEPATIC   (+) GERD (gastroesophageal reflux disease)      /RENAL   (+) BPH (benign prostatic hyperplasia)   (+) Nephrolithiasis   (+) Renal cyst   (+) Stage 3a chronic kidney disease (HCC)      GYN (within normal limits)      HEMATOLOGY   (+) Anemia   (+) Platelets decreased (HCC)      MUSCULOSKELETAL (within normal limits)      NEURO/PSYCH (within normal limits)      PULMONARY (within normal limits)      Other   (+) Shin esophagus   (+) Irregular heart beat        Physical Exam    Airway    Mallampati score: II  TM Distance: >3 FB  Neck ROM: full     Dental   No notable dental hx     Cardiovascular  Cardiovascular exam normal    Pulmonary  Pulmonary exam normal     Other Findings        Anesthesia Plan  ASA Score- 2     Anesthesia Type- IV sedation with anesthesia with ASA Monitors  Additional Monitors:   Airway Plan:           Plan Factors-Exercise tolerance (METS): >4 METS  Chart reviewed  Patient is not a current smoker  Induction- intravenous  Postoperative Plan-     Informed Consent- Anesthetic plan and risks discussed with patient

## 2022-04-13 NOTE — ANESTHESIA POSTPROCEDURE EVALUATION
Post-Op Assessment Note    CV Status:  Stable  Pain Score: 0    Pain management: adequate     Mental Status:  Alert and awake   Hydration Status:  Stable   PONV Controlled:  None   Airway Patency:  Patent      Post Op Vitals Reviewed: Yes      Staff: CRNA         No complications documented      /67 (04/13/22 1432)    Temp      Pulse 78 (04/13/22 1432)   Resp 18 (04/13/22 1432)    SpO2 98 % (04/13/22 1432)

## 2022-04-13 NOTE — INTERVAL H&P NOTE
H&P reviewed  After examining the patient I find no changes in the patients condition since the H&P had been written      Vitals:    04/13/22 1311   BP: 141/77   Pulse: 77   Resp: 18   Temp: 97 5 °F (36 4 °C)   SpO2: 99%

## 2022-07-12 ENCOUNTER — LAB (OUTPATIENT)
Dept: LAB | Facility: CLINIC | Age: 77
End: 2022-07-12
Payer: MEDICARE

## 2022-07-12 DIAGNOSIS — N18.31 STAGE 3A CHRONIC KIDNEY DISEASE (HCC): ICD-10-CM

## 2022-07-12 DIAGNOSIS — N18.30 ANEMIA DUE TO STAGE 3 CHRONIC KIDNEY DISEASE, UNSPECIFIED WHETHER STAGE 3A OR 3B CKD (HCC): ICD-10-CM

## 2022-07-12 DIAGNOSIS — D63.1 ANEMIA DUE TO STAGE 3 CHRONIC KIDNEY DISEASE, UNSPECIFIED WHETHER STAGE 3A OR 3B CKD (HCC): ICD-10-CM

## 2022-07-12 DIAGNOSIS — K22.70 BARRETT'S ESOPHAGUS WITHOUT DYSPLASIA: ICD-10-CM

## 2022-07-12 LAB
ANION GAP SERPL CALCULATED.3IONS-SCNC: 1 MMOL/L (ref 4–13)
BUN SERPL-MCNC: 23 MG/DL (ref 5–25)
CALCIUM SERPL-MCNC: 9.1 MG/DL (ref 8.3–10.1)
CHLORIDE SERPL-SCNC: 110 MMOL/L (ref 100–108)
CO2 SERPL-SCNC: 28 MMOL/L (ref 21–32)
CREAT SERPL-MCNC: 1.43 MG/DL (ref 0.6–1.3)
ERYTHROCYTE [DISTWIDTH] IN BLOOD BY AUTOMATED COUNT: 12.5 % (ref 11.6–15.1)
GFR SERPL CREATININE-BSD FRML MDRD: 46 ML/MIN/1.73SQ M
GLUCOSE P FAST SERPL-MCNC: 92 MG/DL (ref 65–99)
HCT VFR BLD AUTO: 40.8 % (ref 36.5–49.3)
HGB BLD-MCNC: 13.4 G/DL (ref 12–17)
MCH RBC QN AUTO: 30.2 PG (ref 26.8–34.3)
MCHC RBC AUTO-ENTMCNC: 32.8 G/DL (ref 31.4–37.4)
MCV RBC AUTO: 92 FL (ref 82–98)
PLATELET # BLD AUTO: 198 THOUSANDS/UL (ref 149–390)
PMV BLD AUTO: 10.9 FL (ref 8.9–12.7)
POTASSIUM SERPL-SCNC: 5 MMOL/L (ref 3.5–5.3)
RBC # BLD AUTO: 4.43 MILLION/UL (ref 3.88–5.62)
SODIUM SERPL-SCNC: 139 MMOL/L (ref 136–145)
WBC # BLD AUTO: 6.82 THOUSAND/UL (ref 4.31–10.16)

## 2022-07-12 PROCEDURE — 36415 COLL VENOUS BLD VENIPUNCTURE: CPT

## 2022-07-12 PROCEDURE — 80048 BASIC METABOLIC PNL TOTAL CA: CPT

## 2022-07-12 PROCEDURE — 85027 COMPLETE CBC AUTOMATED: CPT

## 2022-07-18 ENCOUNTER — OFFICE VISIT (OUTPATIENT)
Dept: INTERNAL MEDICINE CLINIC | Facility: CLINIC | Age: 77
End: 2022-07-18
Payer: MEDICARE

## 2022-07-18 VITALS
OXYGEN SATURATION: 98 % | WEIGHT: 140 LBS | DIASTOLIC BLOOD PRESSURE: 70 MMHG | SYSTOLIC BLOOD PRESSURE: 130 MMHG | HEART RATE: 63 BPM | BODY MASS INDEX: 20.73 KG/M2 | HEIGHT: 69 IN

## 2022-07-18 DIAGNOSIS — N40.0 BENIGN PROSTATIC HYPERPLASIA, UNSPECIFIED WHETHER LOWER URINARY TRACT SYMPTOMS PRESENT: ICD-10-CM

## 2022-07-18 DIAGNOSIS — E44.1 MILD PROTEIN-CALORIE MALNUTRITION (HCC): ICD-10-CM

## 2022-07-18 DIAGNOSIS — N18.31 STAGE 3A CHRONIC KIDNEY DISEASE (HCC): ICD-10-CM

## 2022-07-18 DIAGNOSIS — E78.00 HYPERCHOLESTEREMIA: ICD-10-CM

## 2022-07-18 DIAGNOSIS — K22.70 BARRETT'S ESOPHAGUS WITHOUT DYSPLASIA: Primary | ICD-10-CM

## 2022-07-18 DIAGNOSIS — D69.6 PLATELETS DECREASED (HCC): ICD-10-CM

## 2022-07-18 PROCEDURE — 99214 OFFICE O/P EST MOD 30 MIN: CPT | Performed by: INTERNAL MEDICINE

## 2022-07-18 RX ORDER — TAMSULOSIN HYDROCHLORIDE 0.4 MG/1
0.4 CAPSULE ORAL
Qty: 90 CAPSULE | Refills: 1 | Status: SHIPPED | OUTPATIENT
Start: 2022-07-18

## 2022-07-18 NOTE — ASSESSMENT & PLAN NOTE
Last EGD was on 3/2022  1  Moderate size hiatal hernia noted  2  Barretts esophagus and reflux  3  Antral gastritis  Currently on famotidine  No complaints  Stable

## 2022-07-18 NOTE — ASSESSMENT & PLAN NOTE
Some voluntary weight loss  Recommend protein powder every day  Jennifer Smith vitamin once a day  Did not lose any more weight

## 2022-07-18 NOTE — ASSESSMENT & PLAN NOTE
Lab Results   Component Value Date    CHOLESTEROL 190 01/13/2022    CHOLESTEROL 167 06/14/2021    CHOLESTEROL 173 12/21/2020     Lab Results   Component Value Date    HDL 50 01/13/2022    HDL 56 06/14/2021    HDL 52 12/21/2020     Lab Results   Component Value Date    TRIG 126 01/13/2022    TRIG 56 06/14/2021    TRIG 87 12/21/2020     Lab Results   Component Value Date    NONHDLC 140 01/13/2022    [place] 111 06/14/2021    [place] 121 12/21/2020     On pravastatin

## 2022-07-18 NOTE — ASSESSMENT & PLAN NOTE
Lab Results   Component Value Date    WBC 6 82 07/12/2022    HGB 13 4 07/12/2022    HCT 40 8 07/12/2022    MCV 92 07/12/2022     07/12/2022   plt stable will monitor

## 2022-07-18 NOTE — ASSESSMENT & PLAN NOTE
Lab Results   Component Value Date    EGFR 46 07/12/2022    EGFR 54 10/05/2021    EGFR 52 06/14/2021    CREATININE 1 43 (H) 07/12/2022    CREATININE 1 28 10/05/2021    CREATININE 1 32 (H) 06/14/2021     Stable  Baseline cr around 1 3 - 1 4  He follows Nephrology closely     Monitor BMP

## 2022-08-05 ENCOUNTER — TELEPHONE (OUTPATIENT)
Dept: NEPHROLOGY | Facility: CLINIC | Age: 77
End: 2022-08-05

## 2022-08-05 NOTE — TELEPHONE ENCOUNTER
Left message to schedule November follow up appointment with Dr Geoffrey Masterson in [place]  This is the 2nd attempt

## 2022-08-29 ENCOUNTER — APPOINTMENT (OUTPATIENT)
Dept: RADIOLOGY | Facility: HOSPITAL | Age: 77
End: 2022-08-29
Payer: MEDICARE

## 2022-08-29 ENCOUNTER — APPOINTMENT (OUTPATIENT)
Dept: NON INVASIVE DIAGNOSTICS | Facility: HOSPITAL | Age: 77
End: 2022-08-29
Attending: INTERNAL MEDICINE
Payer: MEDICARE

## 2022-08-29 ENCOUNTER — HOSPITAL ENCOUNTER (OUTPATIENT)
Facility: HOSPITAL | Age: 77
Setting detail: OBSERVATION
LOS: 1 days | Discharge: HOME | End: 2022-08-30
Attending: EMERGENCY MEDICINE | Admitting: FAMILY MEDICINE
Payer: MEDICARE

## 2022-08-29 ENCOUNTER — PREP FOR PROCEDURE (OUTPATIENT)
Dept: CARDIOLOGY CLINIC | Facility: CLINIC | Age: 77
End: 2022-08-29

## 2022-08-29 DIAGNOSIS — I48.91 ATRIAL FIBRILLATION (HCC): Primary | ICD-10-CM

## 2022-08-29 DIAGNOSIS — I48.0 PAROXYSMAL ATRIAL FIBRILLATION (HCC): Primary | ICD-10-CM

## 2022-08-29 DIAGNOSIS — I48.91 ATRIAL FIBRILLATION WITH RVR (HCC): ICD-10-CM

## 2022-08-29 DIAGNOSIS — I48.0 PAROXYSMAL ATRIAL FIBRILLATION (HCC): ICD-10-CM

## 2022-08-29 PROBLEM — R79.89 ELEVATED TROPONIN: Status: ACTIVE | Noted: 2022-08-29

## 2022-08-29 PROBLEM — R77.8 ELEVATED TROPONIN: Status: ACTIVE | Noted: 2022-08-29

## 2022-08-29 LAB
2HR DELTA HS TROPONIN: 28 NG/L
4HR DELTA HS TROPONIN: 35 NG/L
ALBUMIN SERPL BCP-MCNC: 4 G/DL (ref 3.5–5)
ALP SERPL-CCNC: 81 U/L (ref 46–116)
ALT SERPL W P-5'-P-CCNC: 34 U/L (ref 12–78)
ANION GAP SERPL CALCULATED.3IONS-SCNC: 16 MMOL/L (ref 4–13)
APTT PPP: 134 SECONDS (ref 23–37)
APTT PPP: 23 SECONDS (ref 23–37)
APTT PPP: 67 SECONDS (ref 23–37)
AST SERPL W P-5'-P-CCNC: 39 U/L (ref 5–45)
ATRIAL RATE: 156 BPM
BASOPHILS # BLD AUTO: 0.03 THOUSANDS/ΜL (ref 0–0.1)
BASOPHILS NFR BLD AUTO: 1 % (ref 0–1)
BILIRUB SERPL-MCNC: 0.7 MG/DL (ref 0.2–1)
BUN SERPL-MCNC: 28 MG/DL (ref 5–25)
CALCIUM SERPL-MCNC: 9 MG/DL (ref 8.3–10.1)
CARDIAC TROPONIN I PNL SERPL HS: 33 NG/L
CARDIAC TROPONIN I PNL SERPL HS: 61 NG/L
CARDIAC TROPONIN I PNL SERPL HS: 68 NG/L
CHLORIDE SERPL-SCNC: 104 MMOL/L (ref 96–108)
CO2 SERPL-SCNC: 21 MMOL/L (ref 21–32)
CREAT SERPL-MCNC: 1.72 MG/DL (ref 0.6–1.3)
D DIMER PPP FEU-MCNC: 0.74 UG/ML FEU
EOSINOPHIL # BLD AUTO: 0.1 THOUSAND/ΜL (ref 0–0.61)
EOSINOPHIL NFR BLD AUTO: 2 % (ref 0–6)
ERYTHROCYTE [DISTWIDTH] IN BLOOD BY AUTOMATED COUNT: 12.2 % (ref 11.6–15.1)
GFR SERPL CREATININE-BSD FRML MDRD: 37 ML/MIN/1.73SQ M
GLUCOSE SERPL-MCNC: 100 MG/DL (ref 65–140)
GLUCOSE SERPL-MCNC: 138 MG/DL (ref 65–140)
HCT VFR BLD AUTO: 43.9 % (ref 36.5–49.3)
HGB BLD-MCNC: 14.7 G/DL (ref 12–17)
IMM GRANULOCYTES # BLD AUTO: 0.02 THOUSAND/UL (ref 0–0.2)
IMM GRANULOCYTES NFR BLD AUTO: 0 % (ref 0–2)
INR PPP: 1.14 (ref 0.84–1.19)
LYMPHOCYTES # BLD AUTO: 1.72 THOUSANDS/ΜL (ref 0.6–4.47)
LYMPHOCYTES NFR BLD AUTO: 26 % (ref 14–44)
MAGNESIUM SERPL-MCNC: 1.9 MG/DL (ref 1.6–2.6)
MCH RBC QN AUTO: 30.1 PG (ref 26.8–34.3)
MCHC RBC AUTO-ENTMCNC: 33.5 G/DL (ref 31.4–37.4)
MCV RBC AUTO: 90 FL (ref 82–98)
MONOCYTES # BLD AUTO: 1.07 THOUSAND/ΜL (ref 0.17–1.22)
MONOCYTES NFR BLD AUTO: 16 % (ref 4–12)
NEUTROPHILS # BLD AUTO: 3.65 THOUSANDS/ΜL (ref 1.85–7.62)
NEUTS SEG NFR BLD AUTO: 55 % (ref 43–75)
NRBC BLD AUTO-RTO: 0 /100 WBCS
NT-PROBNP SERPL-MCNC: 694 PG/ML
PLATELET # BLD AUTO: 129 THOUSANDS/UL (ref 149–390)
PMV BLD AUTO: 10.5 FL (ref 8.9–12.7)
POTASSIUM SERPL-SCNC: 3.7 MMOL/L (ref 3.5–5.3)
PROT SERPL-MCNC: 7.5 G/DL (ref 6.4–8.4)
PROTHROMBIN TIME: 14.7 SECONDS (ref 11.6–14.5)
QRS AXIS: 27 DEGREES
QRSD INTERVAL: 84 MS
QT INTERVAL: 248 MS
QTC INTERVAL: 414 MS
RBC # BLD AUTO: 4.88 MILLION/UL (ref 3.88–5.62)
SARS-COV-2 RNA RESP QL NAA+PROBE: NEGATIVE
SODIUM SERPL-SCNC: 141 MMOL/L (ref 135–147)
T WAVE AXIS: -18 DEGREES
TSH SERPL DL<=0.05 MIU/L-ACNC: 3.73 UIU/ML (ref 0.45–4.5)
VENTRICULAR RATE: 168 BPM
WBC # BLD AUTO: 6.59 THOUSAND/UL (ref 4.31–10.16)

## 2022-08-29 PROCEDURE — 93005 ELECTROCARDIOGRAM TRACING: CPT

## 2022-08-29 PROCEDURE — 93010 ELECTROCARDIOGRAM REPORT: CPT | Performed by: INTERNAL MEDICINE

## 2022-08-29 PROCEDURE — 96375 TX/PRO/DX INJ NEW DRUG ADDON: CPT

## 2022-08-29 PROCEDURE — 71045 X-RAY EXAM CHEST 1 VIEW: CPT

## 2022-08-29 PROCEDURE — 85379 FIBRIN DEGRADATION QUANT: CPT | Performed by: EMERGENCY MEDICINE

## 2022-08-29 PROCEDURE — U0003 INFECTIOUS AGENT DETECTION BY NUCLEIC ACID (DNA OR RNA); SEVERE ACUTE RESPIRATORY SYNDROME CORONAVIRUS 2 (SARS-COV-2) (CORONAVIRUS DISEASE [COVID-19]), AMPLIFIED PROBE TECHNIQUE, MAKING USE OF HIGH THROUGHPUT TECHNOLOGIES AS DESCRIBED BY CMS-2020-01-R: HCPCS | Performed by: EMERGENCY MEDICINE

## 2022-08-29 PROCEDURE — 99220 PR INITIAL OBSERVATION CARE/DAY 70 MINUTES: CPT | Performed by: FAMILY MEDICINE

## 2022-08-29 PROCEDURE — 99285 EMERGENCY DEPT VISIT HI MDM: CPT

## 2022-08-29 PROCEDURE — 99214 OFFICE O/P EST MOD 30 MIN: CPT | Performed by: INTERNAL MEDICINE

## 2022-08-29 PROCEDURE — 85025 COMPLETE CBC W/AUTO DIFF WBC: CPT | Performed by: EMERGENCY MEDICINE

## 2022-08-29 PROCEDURE — U0005 INFEC AGEN DETEC AMPLI PROBE: HCPCS | Performed by: EMERGENCY MEDICINE

## 2022-08-29 PROCEDURE — 36415 COLL VENOUS BLD VENIPUNCTURE: CPT | Performed by: EMERGENCY MEDICINE

## 2022-08-29 PROCEDURE — 71250 CT THORAX DX C-: CPT

## 2022-08-29 PROCEDURE — 85610 PROTHROMBIN TIME: CPT | Performed by: EMERGENCY MEDICINE

## 2022-08-29 PROCEDURE — 82948 REAGENT STRIP/BLOOD GLUCOSE: CPT

## 2022-08-29 PROCEDURE — 83880 ASSAY OF NATRIURETIC PEPTIDE: CPT | Performed by: EMERGENCY MEDICINE

## 2022-08-29 PROCEDURE — G1004 CDSM NDSC: HCPCS

## 2022-08-29 PROCEDURE — 99291 CRITICAL CARE FIRST HOUR: CPT | Performed by: EMERGENCY MEDICINE

## 2022-08-29 PROCEDURE — 80053 COMPREHEN METABOLIC PANEL: CPT | Performed by: EMERGENCY MEDICINE

## 2022-08-29 PROCEDURE — 85730 THROMBOPLASTIN TIME PARTIAL: CPT | Performed by: EMERGENCY MEDICINE

## 2022-08-29 PROCEDURE — 84484 ASSAY OF TROPONIN QUANT: CPT | Performed by: EMERGENCY MEDICINE

## 2022-08-29 PROCEDURE — 83735 ASSAY OF MAGNESIUM: CPT | Performed by: EMERGENCY MEDICINE

## 2022-08-29 PROCEDURE — 96365 THER/PROPH/DIAG IV INF INIT: CPT

## 2022-08-29 PROCEDURE — 84443 ASSAY THYROID STIM HORMONE: CPT | Performed by: EMERGENCY MEDICINE

## 2022-08-29 PROCEDURE — 85730 THROMBOPLASTIN TIME PARTIAL: CPT | Performed by: FAMILY MEDICINE

## 2022-08-29 RX ORDER — PRAVASTATIN SODIUM 20 MG
10 TABLET ORAL DAILY
Status: DISCONTINUED | OUTPATIENT
Start: 2022-08-29 | End: 2022-08-30 | Stop reason: HOSPADM

## 2022-08-29 RX ORDER — DILTIAZEM HYDROCHLORIDE 5 MG/ML
15 INJECTION INTRAVENOUS ONCE
Status: COMPLETED | OUTPATIENT
Start: 2022-08-29 | End: 2022-08-29

## 2022-08-29 RX ORDER — HEPARIN SODIUM 1000 [USP'U]/ML
4800 INJECTION, SOLUTION INTRAVENOUS; SUBCUTANEOUS ONCE
Status: DISCONTINUED | OUTPATIENT
Start: 2022-08-29 | End: 2022-08-30 | Stop reason: HOSPADM

## 2022-08-29 RX ORDER — HEPARIN SODIUM 1000 [USP'U]/ML
2400 INJECTION, SOLUTION INTRAVENOUS; SUBCUTANEOUS
Status: DISCONTINUED | OUTPATIENT
Start: 2022-08-29 | End: 2022-08-30 | Stop reason: HOSPADM

## 2022-08-29 RX ORDER — ASPIRIN 81 MG/1
81 TABLET ORAL DAILY
Status: DISCONTINUED | OUTPATIENT
Start: 2022-08-29 | End: 2022-08-30 | Stop reason: HOSPADM

## 2022-08-29 RX ORDER — TAMSULOSIN HYDROCHLORIDE 0.4 MG/1
0.4 CAPSULE ORAL
Status: DISCONTINUED | OUTPATIENT
Start: 2022-08-29 | End: 2022-08-30 | Stop reason: HOSPADM

## 2022-08-29 RX ORDER — FAMOTIDINE 20 MG/1
40 TABLET, FILM COATED ORAL DAILY
Status: DISCONTINUED | OUTPATIENT
Start: 2022-08-29 | End: 2022-08-30 | Stop reason: HOSPADM

## 2022-08-29 RX ORDER — HEPARIN SODIUM 1000 [USP'U]/ML
4800 INJECTION, SOLUTION INTRAVENOUS; SUBCUTANEOUS
Status: DISCONTINUED | OUTPATIENT
Start: 2022-08-29 | End: 2022-08-30 | Stop reason: HOSPADM

## 2022-08-29 RX ORDER — METOPROLOL TARTRATE 5 MG/5ML
5 INJECTION INTRAVENOUS ONCE
Status: COMPLETED | OUTPATIENT
Start: 2022-08-29 | End: 2022-08-29

## 2022-08-29 RX ORDER — ADENOSINE 3 MG/ML
INJECTION, SOLUTION INTRAVENOUS
Status: DISCONTINUED
Start: 2022-08-29 | End: 2022-08-29 | Stop reason: WASHOUT

## 2022-08-29 RX ORDER — MELATONIN
1000 DAILY
Status: DISCONTINUED | OUTPATIENT
Start: 2022-08-29 | End: 2022-08-30 | Stop reason: HOSPADM

## 2022-08-29 RX ORDER — SODIUM CHLORIDE 9 MG/ML
50 INJECTION, SOLUTION INTRAVENOUS CONTINUOUS
Status: DISPENSED | OUTPATIENT
Start: 2022-08-29 | End: 2022-08-29

## 2022-08-29 RX ORDER — TAMSULOSIN HYDROCHLORIDE 0.4 MG/1
0.4 CAPSULE ORAL
Status: DISCONTINUED | OUTPATIENT
Start: 2022-08-29 | End: 2022-08-29

## 2022-08-29 RX ORDER — HEPARIN SODIUM 10000 [USP'U]/100ML
3-30 INJECTION, SOLUTION INTRAVENOUS
Status: DISCONTINUED | OUTPATIENT
Start: 2022-08-29 | End: 2022-08-30 | Stop reason: HOSPADM

## 2022-08-29 RX ORDER — METOPROLOL SUCCINATE 25 MG/1
12.5 TABLET, EXTENDED RELEASE ORAL DAILY
Status: DISCONTINUED | OUTPATIENT
Start: 2022-08-29 | End: 2022-08-30 | Stop reason: HOSPADM

## 2022-08-29 RX ADMIN — DILTIAZEM HYDROCHLORIDE 15 MG: 5 INJECTION INTRAVENOUS at 06:20

## 2022-08-29 RX ADMIN — SODIUM CHLORIDE 1000 ML: 0.9 INJECTION, SOLUTION INTRAVENOUS at 05:24

## 2022-08-29 RX ADMIN — METOPROLOL SUCCINATE 12.5 MG: 25 TABLET, EXTENDED RELEASE ORAL at 14:22

## 2022-08-29 RX ADMIN — SODIUM CHLORIDE 500 ML: 0.9 INJECTION, SOLUTION INTRAVENOUS at 10:24

## 2022-08-29 RX ADMIN — SODIUM CHLORIDE 1000 ML: 0.9 INJECTION, SOLUTION INTRAVENOUS at 06:45

## 2022-08-29 RX ADMIN — FAMOTIDINE 40 MG: 20 TABLET ORAL at 14:23

## 2022-08-29 RX ADMIN — Medication 1000 UNITS: at 14:23

## 2022-08-29 RX ADMIN — METOPROLOL TARTRATE 5 MG: 1 INJECTION, SOLUTION INTRAVENOUS at 05:26

## 2022-08-29 RX ADMIN — PRAVASTATIN SODIUM 10 MG: 20 TABLET ORAL at 14:23

## 2022-08-29 RX ADMIN — HEPARIN SODIUM 15 UNITS/KG/HR: 10000 INJECTION, SOLUTION INTRAVENOUS at 14:22

## 2022-08-29 RX ADMIN — HEPARIN SODIUM 4800 UNITS: 1000 INJECTION INTRAVENOUS; SUBCUTANEOUS at 06:10

## 2022-08-29 RX ADMIN — CYANOCOBALAMIN TAB 500 MCG 1000 MCG: 500 TAB at 14:23

## 2022-08-29 RX ADMIN — HEPARIN SODIUM 18 UNITS/KG/HR: 10000 INJECTION, SOLUTION INTRAVENOUS at 06:13

## 2022-08-29 RX ADMIN — SODIUM CHLORIDE 50 ML/HR: 0.9 INJECTION, SOLUTION INTRAVENOUS at 12:30

## 2022-08-29 RX ADMIN — TAMSULOSIN HYDROCHLORIDE 0.4 MG: 0.4 CAPSULE ORAL at 12:39

## 2022-08-29 NOTE — PLAN OF CARE
Problem: Potential for Falls  Goal: Patient will remain free of falls  Description: INTERVENTIONS:  - Educate patient/family on patient safety including physical limitations  - Instruct patient to call for assistance with activity   - Consult OT/PT to assist with strengthening/mobility   - Keep Call bell within reach  - Keep bed low and locked with side rails adjusted as appropriate  - Keep care items and personal belongings within reach  - Initiate and maintain comfort rounds  - Make Fall Risk Sign visible to staff  - Offer Toileting every 2 Hours, in advance of need  - Initiate/Maintain 1alarm  - Obtain necessary fall risk management equipment: call bell use  - Apply yellow socks and bracelet for high fall risk patients  - Consider moving patient to room near nurses station  Outcome: Progressing     Problem: PAIN - ADULT  Goal: Verbalizes/displays adequate comfort level or baseline comfort level  Description: Interventions:  - Encourage patient to monitor pain and request assistance  - Assess pain using appropriate pain scale  - Administer analgesics based on type and severity of pain and evaluate response  - Implement non-pharmacological measures as appropriate and evaluate response  - Consider cultural and social influences on pain and pain management  - Notify physician/advanced practitioner if interventions unsuccessful or patient reports new pain  Outcome: Progressing     Problem: INFECTION - ADULT  Goal: Absence or prevention of progression during hospitalization  Description: INTERVENTIONS:  - Assess and monitor for signs and symptoms of infection  - Monitor lab/diagnostic results  - Monitor all insertion sites, i e  indwelling lines, tubes, and drains  - [place] appropriate cooling/warming therapies per order  - Administer medications as ordered  - Instruct and encourage patient and family to use good hand hygiene technique  - Identify and instruct in appropriate isolation precautions for identified infection/condition  Outcome: Progressing  Goal: Absence of fever/infection during neutropenic period  Description: INTERVENTIONS:  - Monitor WBC    Outcome: Progressing     Problem: SAFETY ADULT  Goal: Patient will remain free of falls  Description: INTERVENTIONS:  - Educate patient/family on patient safety including physical limitations  - Instruct patient to call for assistance with activity   - Consult OT/PT to assist with strengthening/mobility   - Keep Call bell within reach  - Keep bed low and locked with side rails adjusted as appropriate  - Keep care items and personal belongings within reach  - Initiate and maintain comfort rounds  - Make Fall Risk Sign visible to staff  - Offer Toileting every 2 Hours, in advance of need  - Initiate/Maintain 1alarm  - Obtain necessary fall risk management equipment: call bell use  - Apply yellow socks and bracelet for high fall risk patients  - Consider moving patient to room near nurses station  Outcome: Progressing  Goal: Maintain or return to baseline ADL function  Description: INTERVENTIONS:  -  Assess patient's ability to carry out ADLs; assess patient's baseline for ADL function and identify physical deficits which impact ability to perform ADLs (bathing, care of mouth/teeth, toileting, grooming, dressing, etc )  - Assess/evaluate cause of self-care deficits   - Assess range of motion  - Assess patient's mobility; develop plan if impaired  - Assess patient's need for assistive devices and provide as appropriate  - Encourage maximum independence but intervene and supervise when necessary  - Involve family in performance of ADLs  - Assess for home care needs following discharge   - Consider OT consult to assist with ADL evaluation and planning for discharge  - Provide patient education as appropriate  Outcome: Progressing  Goal: Maintains/Returns to pre admission functional level  Description: INTERVENTIONS:  - Perform BMAT or MOVE assessment daily    - Set and communicate daily mobility goal to care team and patient/family/caregiver  - Collaborate with rehabilitation services on mobility goals if consulted  - Perform Range of Motion 3 times a day  - Reposition patient every 3 hours  - Dangle patient 3 times a day  - Stand patient 3 times a day  - Ambulate patient 3 times a day  - Out of bed to chair 3 times a day   - Out of bed for meals 3 times a day  - Out of bed for toileting  - Record patient progress and toleration of activity level   Outcome: Progressing     Problem: DISCHARGE PLANNING  Goal: Discharge to home or other facility with appropriate resources  Description: INTERVENTIONS:  - Identify barriers to discharge w/patient and caregiver  - Arrange for needed discharge resources and transportation as appropriate  - Identify discharge learning needs (meds, wound care, etc )  - Arrange for interpretive services to assist at discharge as needed  - Refer to Case Management Department for coordinating discharge planning if the patient needs post-hospital services based on physician/advanced practitioner order or complex needs related to functional status, cognitive ability, or social support system  Outcome: Progressing     Problem: Knowledge Deficit  Goal: Patient/family/caregiver demonstrates understanding of disease process, treatment plan, medications, and discharge instructions  Description: Complete learning assessment and assess knowledge base    Interventions:  - Provide teaching at level of understanding  - Provide teaching via preferred learning methods  Outcome: Progressing

## 2022-08-29 NOTE — H&P
Delicia 44  H&P- Marah U  6  1945, 67 y o  male MRN: [id number]  Unit/Bed#: ED 07 Encounter: [id number]  Primary Care Provider: Mya Vazquez MD   Date and time admitted to hospital: 8/29/2022  5:01 AM    * Atrial fibrillation with RVR [facility])  Assessment & Plan  Patient presented to the ER with complaints palpitations, burning sensation in his chest was started at around 4:00 a m  Along with brief episode of lightheadedness  · In the ER patient was noted to be in rapid AFib  · Received IV Cardizem x1 along with fluid boluses due to hypotension  · Keep NPO for cardioversion  · Continue heparin drip  · Due to soft BPs, will hold off on Cardizem drip per Cardiology  · TSH within normal limits  · Chest x-ray showed possible lung nodule but CT scan shows no nodule    Elevated troponin  Assessment & Plan  Troponin peak of 68, likely due to rapid AFib  - per Cardiology plan for stress test in a m  Hypercholesteremia  Assessment & Plan  Continue Pravachol    Shin esophagus  Assessment & Plan  Continue Pepcid    Stage 3a chronic kidney disease [facility])  Assessment & Plan  Lab Results   Component Value Date    EGFR 37 08/29/2022    EGFR 46 07/12/2022    EGFR 54 10/05/2021    CREATININE 1 72 (H) 08/29/2022    CREATININE 1 43 (H) 07/12/2022    CREATININE 1 28 10/05/2021     Creatinine appears mildly elevated compared to baseline  · Received fluid boluses low in the ER due to hypotension  · Gentle IV fluids  · Repeat lab work in a m  VTE Pharmacologic Prophylaxis:   Heparin drip  Code Status: full code    Anticipated Length of Stay: Patient will be admitted on an observation basis with an anticipated length of stay of less than 2 midnights secondary to AFib with RVR  Total Time for Visit, including Counseling / Coordination of Care: 45 minutes Greater than 50% of this total time spent on direct patient counseling and coordination of care      Chief Complaint:  Palpitations, chest burning    History of Present Illness:  Clay Ferrer is a 67 y o  male who presents with burning sensation in his chest along with palpitations that started at around 4:00 a m  Dara Soulier Patient states his symptoms were constant in nature but has now fully resolved  Reports brief episode of lightheadedness this morning which has also resolved  In the ER he was noted to be in rapid AFib and received IV Lopressor and IV Cardizem with some improvement in heart rate    Review of Systems:  Review of Systems   Constitutional: Positive for appetite change  Negative for chills and fever  HENT: Negative for trouble swallowing  Eyes: Negative for photophobia and visual disturbance  Respiratory: Negative for shortness of breath  Cardiovascular: Positive for palpitations  Negative for chest pain  Gastrointestinal: Positive for diarrhea (chronic, intermittent)  Negative for abdominal pain, nausea and vomiting  Genitourinary: Positive for frequency (chronic)  Negative for dysuria and hematuria  Musculoskeletal: Negative for back pain  Skin: Negative for wound  Neurological: Positive for light-headedness  Negative for syncope  Hematological: Does not bruise/bleed easily  Psychiatric/Behavioral: Negative for agitation  Past Medical and Surgical History:   Past Medical History:   Diagnosis Date    Shin's esophagus     BPH (benign prostatic hyperplasia)     Colon polyp     GERD (gastroesophageal reflux disease)     Herpes     Hyperlipidemia     Irregular heart beat     Kidney stone        Past Surgical History:   Procedure Laterality Date    CATARACT EXTRACTION      COLONOSCOPY      EGD AND COLONOSCOPY      LITHOTRIPSY         Meds/Allergies:  Prior to Admission medications    Medication Sig Start Date End Date Taking?  Authorizing Provider   aspirin (ECOTRIN LOW STRENGTH) 81 mg EC tablet Take 81 mg by mouth daily   Yes Historical Provider, MD   cholecalciferol (VITAMIN D3) 1,000 units tablet Take 1,000 Units by mouth daily   Yes Historical Provider, MD   famotidine (PEPCID) 40 MG tablet Take 1 tablet (40 mg total) by mouth daily 3/25/22  Yes Nu Cat MD   pravastatin (PRAVACHOL) 10 mg tablet Take 1 tablet (10 mg total) by mouth daily 1/18/22  Yes Brandi Hathaway MD   tamsulosin (FLOMAX) 0 4 mg Take 1 capsule (0 4 mg total) by mouth daily with dinner 7/18/22  Yes Shanna Pitts MD   vitamin B-12 (VITAMIN B-12) 1,000 mcg tablet Take 1,000 mcg by mouth daily   Yes Historical Provider, MD   tadalafil (CIALIS) 5 MG tablet Take 1 tablet (5 mg total) by mouth daily  Patient not taking: Reported on 8/29/2022 2/24/22 8/29/22  Nu Cat MD   tadalafil (CIALIS) 5 MG tablet Take 1 tablet (5 mg total) by mouth daily as needed for erectile dysfunction  Patient not taking: Reported on 8/29/2022 3/25/22 8/29/22  Nu Cat MD     I have reviewed home medications with patient personally      Allergies: No Known Allergies    Social History:  Marital Status:    Patient Pre-hospital Living Situation: Alone  Patient Pre-hospital Level of Mobility: walks     Substance Use History:   Social History     Substance and Sexual Activity   Alcohol Use Yes    Alcohol/week: 1 0 - 2 0 standard drink    Types: 1 - 2 Glasses of wine per week    Comment: occas     Social History     Tobacco Use   Smoking Status Never Smoker   Smokeless Tobacco Never Used     Social History     Substance and Sexual Activity   Drug Use No       Family History:  Family History   Problem Relation Age of Onset    No Known Problems Mother     No Known Problems Father        Physical Exam:     Vitals:   Blood Pressure: 124/76 (08/29/22 1057)  Pulse: (!) 136 (08/29/22 1057)  Temperature: 98 °F (36 7 °C) (08/29/22 0800)  Temp Source: Tympanic (08/29/22 0510)  Respirations: 15 (08/29/22 1057)  Height: 5' 9" (175 3 cm) (08/29/22 0510)  Weight - Scale: 63 5 kg (140 lb) (08/29/22 0510)  SpO2: 97 % (08/29/22 1057)    Physical Exam  Vitals reviewed  Constitutional:       General: He is not in acute distress  Appearance: He is not ill-appearing  HENT:      Head: Normocephalic and atraumatic  Eyes:      General:         Right eye: No discharge  Left eye: No discharge  Extraocular Movements: Extraocular movements intact  Cardiovascular:      Rate and Rhythm: Tachycardia present  Rhythm irregular  Pulmonary:      Effort: Pulmonary effort is normal  No respiratory distress  Breath sounds: Normal breath sounds  No wheezing or rales  Abdominal:      General: Bowel sounds are normal  There is no distension  Palpations: Abdomen is soft  Tenderness: There is no abdominal tenderness  Musculoskeletal:      Right lower leg: No edema  Left lower leg: No edema  Neurological:      Mental Status: He is alert and oriented to person, place, and time  Additional Data:     Lab Results:  Results from last 7 days   Lab Units 08/29/22  0523   WBC Thousand/uL 6 59   HEMOGLOBIN g/dL 14 7   HEMATOCRIT % 43 9   PLATELETS Thousands/uL 129*   NEUTROS PCT % 55   LYMPHS PCT % 26   MONOS PCT % 16*   EOS PCT % 2     Results from last 7 days   Lab Units 08/29/22  0523   SODIUM mmol/L 141   POTASSIUM mmol/L 3 7   CHLORIDE mmol/L 104   CO2 mmol/L 21   BUN mg/dL 28*   CREATININE mg/dL 1 72*   ANION GAP mmol/L 16*   CALCIUM mg/dL 9 0   ALBUMIN g/dL 4 0   TOTAL BILIRUBIN mg/dL 0 70   ALK PHOS U/L 81   ALT U/L 34   AST U/L 39   GLUCOSE RANDOM mg/dL 138     Results from last 7 days   Lab Units 08/29/22  0523   INR  1 14     Results from last 7 days   Lab Units 08/29/22  0813   POC GLUCOSE mg/dl 100               Imaging: Reviewed radiology reports from this admission including: chest xray and chest CT scan  CT chest without contrast   Final Result by Idalia Hwang MD (08/29 1041)      No lung nodule    The nodule in the medial left lung on the chest radiograph is a mildly dilated confluence of the left inferior pulmonary veins, a normal variant  No follow-up is needed  Workstation performed: [id number]         XR chest 1 view portable   Final Result by Kalyani Ly MD (08/29 4182)      Persistent 2 5 cm nodular opacity over the medial left midlung  Recommend further evaluation with chest CT with no contrast                   Workstation performed: [id number]         XR chest 1 view portable   Final Result by Kalyani Ly MD (08/29 0827)      No acute disease  2 5 cm nodule in the medial left lung, new since 2013  Recommend further evaluation with a chest CT with no contrast         I personally discussed this study with Dr Daljit Allison on 8/29/2022 at 8:27 AM                            Workstation performed: [id number]             EKG and Other Studies Reviewed on Admission:   · EKG: A-fib with tachycardia  ** Please Note: This note has been constructed using a voice recognition system   **

## 2022-08-29 NOTE — CONSULTS
Consultation - Cardiology   Kimberly Cardenas 67 y o  male MRN: [id number]  Unit/Bed#: ED 07 Encounter: [id number]  08/29/22  10:09 AM          Physician Requesting Consult: Jayla Menjivar DO  Reason for Consult / Principal Problem:  Atrial fibrillation      Assessment:  1  Atrial fibrillation with RVR - no obvious inciting event  Did not drink any alcohol over the weekend  2  Abnormal troponin - likely non-myocardial infarction troponin elevation due to tachycardia  Currently, he is in is free of symptoms  The likelihood of acute coronary syndrome as cause of his atrial fibrillation is low  3  Hypertension  4  CKD - acute worsening     Plan:  - Discussed with patient in detail  Will plan for MARVEL followed by cardioversion today  Patient was started on IV heparin in the emergency room  - will give IV fluids  Renal function is worsened compared to previous  - if cardioversion successful today, will plan for stress test in morning   - check TSH  History of Present Illness   HPI: Janak Nieves is a 67y o  year old male who presents with chest pain  Patient woke this morning at 4:00 a m  With chest pain which he describes as a burning sensation in the center part of his chest associated with palpitations  Did not have any associated shortness of breath, syncope or near syncope  He took 1 aspirin but symptoms continued and he came to the emergency room for further evaluation  There was no inciting event  He did not drink any alcohol over the weekend  Yesterday, he went on a hike without any change in his exercise capacity  Workup in the emergency room revealed atrial fibrillation on an ECG  Blood work showed troponin of 33 followed by 61  A BNP was 694  Creatinine was elevated at 1 72  Patient follows with Dr Mulu Albarran because of palpitations and hypertension    He had previous workup including treadmill stress test in September 2020 which was normal   Holter monitor done at that time showed 1 3% PVC burden  Echocardiogram in 2019 showed normal ejection fraction with normal valve function  He has no prior history of atrial fibrillation  Review of Systems:    Review of Systems   Respiratory: Negative for shortness of breath  Cardiovascular: Positive for chest pain  Negative for palpitations and leg swelling  All other systems reviewed and are negative  Historical Information   Past Medical History:   Diagnosis Date    Shin's esophagus     BPH (benign prostatic hyperplasia)     Colon polyp     GERD (gastroesophageal reflux disease)     Herpes     Hyperlipidemia     Irregular heart beat     Kidney stone      Past Surgical History:   Procedure Laterality Date    CATARACT EXTRACTION      COLONOSCOPY      EGD AND COLONOSCOPY      LITHOTRIPSY       Social History     Substance and Sexual Activity   Alcohol Use Yes    Alcohol/week: 1 0 - 2 0 standard drink    Types: 1 - 2 Glasses of wine per week    Comment: occas     Social History     Substance and Sexual Activity   Drug Use No     Social History     Tobacco Use   Smoking Status Never Smoker   Smokeless Tobacco Never Used       Family History:   Family History   Problem Relation Age of Onset    No Known Problems Mother     No Known Problems Father        Meds/Allergies   all current active meds have been reviewed  No Known Allergies    Objective   Vitals: Blood pressure 100/64, pulse (!) 119, temperature 98 °F (36 7 °C), resp  rate 14, height 5' 9" (1 753 m), weight 63 5 kg (140 lb), SpO2 98 %  , Body mass index is 20 67 kg/m²  Physical Exam   Constitutional: He appears healthy  No distress  Eyes: Pupils are equal, round, and reactive to light  Conjunctivae are normal    Neck: No JVD present  Cardiovascular: Normal heart sounds  An irregularly irregular rhythm present  Tachycardia present  Exam reveals no gallop and no friction rub  No murmur heard    Pulmonary/Chest: Effort normal and breath sounds normal  He has no wheezes  He has no rales  Musculoskeletal:         General: No tenderness, deformity or edema  Cervical back: Normal range of motion and neck supple  Neurological: He is alert and oriented to person, place, and time  Skin: Skin is warm and dry  Sutures left ear       Lab Results:     Troponins:       CBC with diff:   Results from last 7 days   Lab Units 08/29/22  0523   WBC Thousand/uL 6 59   HEMOGLOBIN g/dL 14 7   HEMATOCRIT % 43 9   MCV fL 90   PLATELETS Thousands/uL 129*   MCH pg 30 1   MCHC g/dL 33 5   RDW % 12 2   MPV fL 10 5   NRBC AUTO /100 WBCs 0       CMP:   Results from last 7 days   Lab Units 08/29/22  0523   POTASSIUM mmol/L 3 7   CHLORIDE mmol/L 104   CO2 mmol/L 21   BUN mg/dL 28*   CREATININE mg/dL 1 72*   CALCIUM mg/dL 9 0   AST U/L 39   ALT U/L 34   ALK PHOS U/L 81   EGFR ml/min/1 73sq m 37       Magnesium:   Results from last 7 days   Lab Units 08/29/22  0523   MAGNESIUM mg/dL 1 9       Coags:   Results from last 7 days   Lab Units 08/29/22  0523   PTT seconds 23   INR  1 14       Lipid Profile:         Cardiac testing: All previous testing has been reviewed  See HPI for summary        EKG: Personally reviewed:  Atrial fibrillation with rapid ventricular response    Imaging: I have personally reviewed pertinent films in PACS

## 2022-08-29 NOTE — ED PROVIDER NOTES
History  Chief Complaint   Patient presents with    Chest Pain     Patient es to the ER this am due to chest pain and low BP     C/o of chest pain palpitations started at 4am   No alcohol use no diarrhea over the past few days unexplained weight loss abdominal pain headache no nausea vomiting no other symptoms  No history of previous arrhythmias  History provided by:  Patient   used: No        Prior to Admission Medications   Prescriptions Last Dose Informant Patient Reported? Taking?   aspirin (ECOTRIN LOW STRENGTH) 81 mg EC tablet 8/29/2022 at Unknown time Self Yes Yes   Sig: Take 81 mg by mouth daily   cholecalciferol (VITAMIN D3) 1,000 units tablet 8/28/2022 at Unknown time Self Yes Yes   Sig: Take 1,000 Units by mouth daily   famotidine (PEPCID) 40 MG tablet 8/28/2022 at Unknown time  No Yes   Sig: Take 1 tablet (40 mg total) by mouth daily   pravastatin (PRAVACHOL) 10 mg tablet 8/28/2022 at Unknown time Self No Yes   Sig: Take 1 tablet (10 mg total) by mouth daily   tamsulosin (FLOMAX) 0 4 mg 8/28/2022 at Unknown time  No Yes   Sig: Take 1 capsule (0 4 mg total) by mouth daily with dinner   vitamin B-12 (VITAMIN B-12) 1,000 mcg tablet 8/28/2022 at Unknown time Self Yes Yes   Sig: Take 1,000 mcg by mouth daily      Facility-Administered Medications: None       Past Medical History:   Diagnosis Date    Shin's esophagus     BPH (benign prostatic hyperplasia)     Colon polyp     GERD (gastroesophageal reflux disease)     Herpes     Hyperlipidemia     Irregular heart beat     Kidney stone        Past Surgical History:   Procedure Laterality Date    CATARACT EXTRACTION      COLONOSCOPY      EGD AND COLONOSCOPY      LITHOTRIPSY         Family History   Problem Relation Age of Onset    No Known Problems Mother     No Known Problems Father      I have reviewed and agree with the history as documented      E-Cigarette/Vaping    E-Cigarette Use Never User E-Cigarette/Vaping Substances    Nicotine No     THC No     CBD No     Flavoring No     Other No     Unknown No      Social History     Tobacco Use    Smoking status: Never Smoker    Smokeless tobacco: Never Used   Vaping Use    Vaping Use: Never used   Substance Use Topics    Alcohol use: Yes     Alcohol/week: 1 0 - 2 0 standard drink     Types: 1 - 2 Glasses of wine per week     Comment: occas    Drug use: No       Review of Systems   Constitutional: Negative  HENT: Negative  Eyes: Negative  Respiratory: Negative  Cardiovascular: Positive for chest pain and palpitations  Gastrointestinal: Negative  Endocrine: Negative  Genitourinary: Negative  Musculoskeletal: Negative  Skin: Negative  Allergic/Immunologic: Negative  Neurological: Negative  Hematological: Negative  Psychiatric/Behavioral: Negative  All other systems reviewed and are negative  Physical Exam  Physical Exam  Constitutional:       Appearance: Normal appearance  He is well-developed  HENT:      Head: Normocephalic and atraumatic  Nose: Nose normal       Mouth/Throat:      Mouth: Mucous membranes are moist    Eyes:      Extraocular Movements: Extraocular movements intact  Pupils: Pupils are equal, round, and reactive to light  Cardiovascular:      Rate and Rhythm: Tachycardia present  Rhythm irregular  Pulmonary:      Effort: Pulmonary effort is normal       Breath sounds: Normal breath sounds  No decreased breath sounds, wheezing, rhonchi or rales  Abdominal:      General: Abdomen is flat  Bowel sounds are normal       Palpations: Abdomen is soft  Musculoskeletal:         General: Normal range of motion  Cervical back: Normal range of motion and neck supple  Skin:     General: Skin is warm  Capillary Refill: Capillary refill takes less than 2 seconds  Neurological:      General: No focal deficit present        Mental Status: He is alert and oriented to person, place, and time  Mental status is at baseline  Psychiatric:         Mood and Affect: Mood normal          Thought Content: Thought content normal          Vital Signs  ED Triage Vitals   Temperature Pulse Respirations Blood Pressure SpO2   08/29/22 0510 08/29/22 0510 08/29/22 0510 08/29/22 0510 08/29/22 0510   98 8 °F (37 1 °C) (!) 168 20 (!) 89/57 100 %      Temp Source Heart Rate Source Patient Position - Orthostatic VS BP Location FiO2 (%)   08/29/22 0510 08/29/22 0510 08/29/22 0510 08/29/22 0510 --   Tympanic Monitor Lying Right arm       Pain Score       08/29/22 0800       No Pain           Vitals:    08/30/22 0241 08/30/22 0731 08/30/22 1022 08/30/22 1245   BP: 100/59 101/61 120/70    Pulse: 70 66 83 74   Patient Position - Orthostatic VS:             Visual Acuity      ED Medications  Medications   sodium chloride 0 9 % infusion (50 mL/hr Intravenous New Bag 8/29/22 1230)   metoprolol (LOPRESSOR) injection 5 mg (5 mg Intravenous Given 8/29/22 0526)   sodium chloride 0 9 % bolus 1,000 mL (0 mL Intravenous Stopped 8/29/22 0552)   diltiazem (CARDIZEM) injection 15 mg (15 mg Intravenous Given 8/29/22 0620)   sodium chloride 0 9 % bolus 1,000 mL (0 mL Intravenous Stopped 8/29/22 0801)   sodium chloride 0 9 % bolus 500 mL (0 mL Intravenous Stopped 8/29/22 1059)       Diagnostic Studies  Results Reviewed     Procedure Component Value Units Date/Time    HS Troponin I 4hr [[id number]]  (Abnormal) Collected: 08/29/22 0950    Lab Status: Final result Specimen: Blood from Arm, Left Updated: 08/29/22 1039     hs TnI 4hr 68 ng/L      Delta 4hr hsTnI 35 ng/L     COVID only [[id number]]  (Normal) Collected: 08/29/22 0801    Lab Status: Final result Specimen: Nares from Nose Updated: 08/29/22 0901     SARS-CoV-2 Negative    Narrative:      FOR PEDIATRIC PATIENTS - copy/paste COVID Guidelines URL to browser: [email or web address]  ash    SARS-CoV-2 assay is a Nucleic Acid Amplification assay intended for the  qualitative detection of nucleic acid from SARS-CoV-2 in nasopharyngeal  swabs  Results are for the presumptive identification of SARS-CoV-2 RNA  Positive results are indicative of infection with SARS-CoV-2, the virus  causing COVID-19, but do not rule out bacterial infection or co-infection  with other viruses  Laboratories within the [place] and its  territories are required to report all positive results to the appropriate  public health authorities  Negative results do not preclude SARS-CoV-2  infection and should not be used as the sole basis for treatment or other  patient management decisions  Negative results must be combined with  clinical observations, patient history, and epidemiological information  This test has not been FDA cleared or approved  This test has been authorized by FDA under an Emergency Use Authorization  (EUA)  This test is only authorized for the duration of time the  declaration that circumstances exist justifying the authorization of the  emergency use of an in vitro diagnostic tests for detection of SARS-CoV-2  virus and/or diagnosis of COVID-19 infection under section 564(b)(1) of  the Act, 21 U  S C  785DPS-4(Y)(7), unless the authorization is terminated  or revoked sooner  The test has been validated but independent review by FDA  and CLIA is pending  Test performed using [employer] GeneXpert: This RT-PCR assay targets N2,  a region unique to SARS-CoV-2  A conserved region in the E-gene was chosen  for pan-Sarbecovirus detection which includes SARS-CoV-2      HS Troponin I 2hr [[id number]]  (Abnormal) Collected: 08/29/22 0810    Lab Status: Final result Specimen: Blood from Arm, Left Updated: 08/29/22 0837     hs TnI 2hr 61 ng/L      Delta 2hr hsTnI 28 ng/L     Fingerstick Glucose (POCT) [[id number]]  (Normal) Collected: 08/29/22 0813    Lab Status: Final result Updated: 08/29/22 0816     POC Glucose 100 mg/dl     HS Troponin 0hr (reflex protocol) [[id number]]  (Normal) Collected: 08/29/22 0523    Lab Status: Final result Specimen: Blood from Arm, Left Updated: 08/29/22 0554     hs TnI 0hr 33 ng/L     Magnesium [[id number]]  (Normal) Collected: 08/29/22 0523    Lab Status: Final result Specimen: Blood from Arm, Left Updated: 08/29/22 0554     Magnesium 1 9 mg/dL     NT-BNP PRO [[id number]]  (Abnormal) Collected: 08/29/22 0523    Lab Status: Final result Specimen: Blood from Arm, Left Updated: 08/29/22 0554     NT-proBNP 694 pg/mL     TSH [[id number]]  (Normal) Collected: 08/29/22 0523    Lab Status: Final result Specimen: Blood from Arm, Left Updated: 08/29/22 0554     TSH 3RD GENERATON 3 728 uIU/mL     Narrative:      Patients undergoing fluorescein dye angiography may retain small amounts of fluorescein in the body for 48-72 hours post procedure  Samples containing fluorescein can produce falsely depressed TSH values  If the patient had this procedure,a specimen should be resubmitted post fluorescein clearance        Comprehensive metabolic panel [[id number]]  (Abnormal) Collected: 08/29/22 0523    Lab Status: Final result Specimen: Blood from Arm, Left Updated: 08/29/22 0547     Sodium 141 mmol/L      Potassium 3 7 mmol/L      Chloride 104 mmol/L      CO2 21 mmol/L      ANION GAP 16 mmol/L      BUN 28 mg/dL      Creatinine 1 72 mg/dL      Glucose 138 mg/dL      Calcium 9 0 mg/dL      AST 39 U/L      ALT 34 U/L      Alkaline Phosphatase 81 U/L      Total Protein 7 5 g/dL      Albumin 4 0 g/dL      Total Bilirubin 0 70 mg/dL      eGFR 37 ml/min/1 73sq m     Narrative:      [place] guidelines for Chronic Kidney Disease (CKD):     Stage 1 with normal or high GFR (GFR > 90 mL/min/1 73 square meters)    Stage 2 Mild CKD (GFR = 60-89 mL/min/1 73 square meters)    Stage 3A Moderate CKD (GFR = 45-59 mL/min/1 73 square meters)    Stage 3B Moderate CKD (GFR = 30-44 mL/min/1 73 square meters)    Stage 4 Severe CKD (GFR = 15-29 mL/min/1 73 square meters)    Stage 5 End Stage CKD (GFR <15 mL/min/1 73 square meters)  Note: GFR calculation is accurate only with a steady state creatinine    Protime-INR [[id number]]  (Abnormal) Collected: 08/29/22 0523    Lab Status: Final result Specimen: Blood from Arm, Left Updated: 08/29/22 0545     Protime 14 7 seconds      INR 1 14    APTT [[id number]]  (Normal) Collected: 08/29/22 0523    Lab Status: Final result Specimen: Blood from Arm, Left Updated: 08/29/22 0545     PTT 23 seconds     D-Dimer [[id number]]  (Abnormal) Collected: 08/29/22 0523    Lab Status: Final result Specimen: Blood from Arm, Left Updated: 08/29/22 0545     D-Dimer, Quant 0 74 ug/ml FEU     Narrative: In the evaluation for possible pulmonary embolism, in the appropriate (Well's Score of 4 or less) patient, the age adjusted d-dimer cutoff for this patient can be calculated as:    Age x 0 01 (in ug/mL) for Age-adjusted D-dimer exclusion threshold for a patient over 50 years  CBC and differential [[id number]]  (Abnormal) Collected: 08/29/22 0523    Lab Status: Final result Specimen: Blood from Arm, Left Updated: 08/29/22 0537     WBC 6 59 Thousand/uL      RBC 4 88 Million/uL      Hemoglobin 14 7 g/dL      Hematocrit 43 9 %      MCV 90 fL      MCH 30 1 pg      MCHC 33 5 g/dL      RDW 12 2 %      MPV 10 5 fL      Platelets 150 Thousands/uL      nRBC 0 /100 WBCs      Neutrophils Relative 55 %      Immat GRANS % 0 %      Lymphocytes Relative 26 %      Monocytes Relative 16 %      Eosinophils Relative 2 %      Basophils Relative 1 %      Neutrophils Absolute 3 65 Thousands/µL      Immature Grans Absolute 0 02 Thousand/uL      Lymphocytes Absolute 1 72 Thousands/µL      Monocytes Absolute 1 07 Thousand/µL      Eosinophils Absolute 0 10 Thousand/µL      Basophils Absolute 0 03 Thousands/µL                  CT chest without contrast   Final Result by Arcadio Boast, MD (08/29 1041)      No lung nodule    The nodule in the medial left lung on the chest radiograph is a mildly dilated confluence of the left inferior pulmonary veins, a normal variant  No follow-up is needed  Workstation performed: [id number]         XR chest 1 view portable   Final Result by Oanh Hebert MD (08/29 5593)      Persistent 2 5 cm nodular opacity over the medial left midlung  Recommend further evaluation with chest CT with no contrast                   Workstation performed: [id number]         XR chest 1 view portable   Final Result by Oanh Hebert MD (08/29 3197)      No acute disease  2 5 cm nodule in the medial left lung, new since 2013    Recommend further evaluation with a chest CT with no contrast         I personally discussed this study with Dr Juventino Pierce on 8/29/2022 at 8:27 AM                            Workstation performed: [id number]                    Procedures  ECG 12 Lead Documentation Only  Performed by: Daniel Lopez DO  Authorized by: Daniel Lopez DO     ECG reviewed by me, the ED Provider: yes    Patient location:  ED  Previous ECG:     Previous ECG:  Unavailable    Comparison to cardiac monitor: Yes    Interpretation:     Interpretation: abnormal    Rate:     ECG rate assessment: tachycardic    Rhythm:     Rhythm: atrial fibrillation    Ectopy:     Ectopy: none    QRS:     QRS axis:  Normal  Conduction:     Conduction: normal    ST segments:     ST segments:  Non-specific  T waves:     T waves: non-specific    CriticalCare Time  Performed by: Daniel Lopez DO  Authorized by: Daniel Lopez DO     Critical care provider statement:     Critical care time (minutes):  35    Critical care was necessary to treat or prevent imminent or life-threatening deterioration of the following conditions:  Cardiac failure    Critical care was time spent personally by me on the following activities:  Blood draw for specimens, development of treatment plan with patient or surrogate, obtaining history from patient or surrogate, discussions with consultants, evaluation of patient's response to treatment, examination of patient, interpretation of cardiac output measurements, ordering and performing treatments and interventions, ordering and review of laboratory studies, ordering and review of radiographic studies, re-evaluation of patient's condition and review of old charts    I assumed direction of critical care for this patient from another provider in my specialty: no               ED Course                                             MDM  Number of Diagnoses or Management Options     Amount and/or Complexity of Data Reviewed  Clinical lab tests: ordered and reviewed  Tests in the radiology section of CPT®: ordered and reviewed  Tests in the medicine section of CPT®: ordered and reviewed    Patient Progress  Patient progress: stable      Disposition  Final diagnoses:   Atrial fibrillation ([facility] )     Time reflects when diagnosis was documented in both MDM as applicable and the Disposition within this note     Time User Action Codes Description Comment    8/29/2022  7:09 AM Nohemi Cruz Add [I48 91] Atrial fibrillation ([facility] [place] [facility] )     8/29/2022 12:15 PM Release User, Automatic Add [I48 0] Paroxysmal atrial fibrillation ([facility] [place] [facility] )     8/30/2022 12:25 PM Felix Alfonso Modify [I48 0] Paroxysmal atrial fibrillation ([facility] [place] [facility] )     8/30/2022 12:25 PM Felix Alfonso Add [I48 91] Atrial fibrillation with [facility])       ED Disposition     ED Disposition   Admit    Condition   Stable    Date/Time   Mon Aug 29, 2022  7:09 AM    Comment               Follow-up Information     Follow up With Specialties Details Why Contact Info    Brandi Hathaway MD Cardiology Follow up in 2 week(s)  [address] [place] [address]  [phone]            Discharge Medication List as of 8/30/2022  2:34 PM      START taking these medications    Details   metoprolol succinate (TOPROL-XL) 25 mg 24 hr tablet Take 1 tablet (25 mg total) by mouth daily, Starting Wed 8/31/2022, Normal      rivaroxaban (Xarelto) 20 mg tablet Take 1 tablet (20 mg total) by mouth daily with breakfast, Starting Tue 8/30/2022, Normal         CONTINUE these medications which have NOT CHANGED    Details   cholecalciferol (VITAMIN D3) 1,000 units tablet Take 1,000 Units by mouth daily, Historical Med      famotidine (PEPCID) 40 MG tablet Take 1 tablet (40 mg total) by mouth daily, Starting Fri 3/25/2022, Normal      pravastatin (PRAVACHOL) 10 mg tablet Take 1 tablet (10 mg total) by mouth daily, Starting Tue 1/18/2022, Normal      tamsulosin (FLOMAX) 0 4 mg Take 1 capsule (0 4 mg total) by mouth daily with dinner, Starting Mon 7/18/2022, Print      vitamin B-12 (VITAMIN B-12) 1,000 mcg tablet Take 1,000 mcg by mouth daily, Historical Med         STOP taking these medications       aspirin (ECOTRIN LOW STRENGTH) 81 mg EC tablet Comments:   Reason for Stopping:               Outpatient Discharge Orders   Discharge Diet     Activity as tolerated       PDMP Review     None          ED Provider  Electronically Signed by           Kita Bence, DO  08/31/22 1468

## 2022-08-29 NOTE — QUICK NOTE
Progress Note - Triage Asssessment   Pat Joseph Reger 67 y o  male MRN: [id number]    Time Called ( Time): 8107  Date Called: 08/29/22  Room#: 7  Person requesting evaluation: Dr Kerry Simms    Situation:    Afib RVR, SBP 88 after Diltiazem 15 mg push, BP responsive to IVF  -125    Interventions:   Start PO cardizem schedule and give dose now          Triage Assessment:     Patient can be admitted to med-surg level of care with Tele    Recommendations discussed with Dr Kerry Simms

## 2022-08-30 ENCOUNTER — APPOINTMENT (OUTPATIENT)
Dept: RADIOLOGY | Facility: HOSPITAL | Age: 77
End: 2022-08-30
Payer: MEDICARE

## 2022-08-30 ENCOUNTER — APPOINTMENT (OUTPATIENT)
Dept: NON INVASIVE DIAGNOSTICS | Facility: HOSPITAL | Age: 77
End: 2022-08-30
Payer: MEDICARE

## 2022-08-30 VITALS
RESPIRATION RATE: 18 BRPM | TEMPERATURE: 98.5 F | DIASTOLIC BLOOD PRESSURE: 70 MMHG | SYSTOLIC BLOOD PRESSURE: 120 MMHG | OXYGEN SATURATION: 97 % | HEART RATE: 74 BPM | WEIGHT: 140 LBS | BODY MASS INDEX: 20.73 KG/M2 | HEIGHT: 69 IN

## 2022-08-30 LAB
ANION GAP SERPL CALCULATED.3IONS-SCNC: 9 MMOL/L (ref 4–13)
APTT PPP: 68 SECONDS (ref 23–37)
ATRIAL RATE: 69 BPM
ATRIAL RATE: 94 BPM
BUN SERPL-MCNC: 21 MG/DL (ref 5–25)
CALCIUM SERPL-MCNC: 7.8 MG/DL (ref 8.3–10.1)
CHEST PAIN STATEMENT: NORMAL
CHEST PAIN STATEMENT: NORMAL
CHLORIDE SERPL-SCNC: 109 MMOL/L (ref 96–108)
CO2 SERPL-SCNC: 23 MMOL/L (ref 21–32)
CREAT SERPL-MCNC: 1.18 MG/DL (ref 0.6–1.3)
ERYTHROCYTE [DISTWIDTH] IN BLOOD BY AUTOMATED COUNT: 12.7 % (ref 11.6–15.1)
GFR SERPL CREATININE-BSD FRML MDRD: 59 ML/MIN/1.73SQ M
GLUCOSE P FAST SERPL-MCNC: 97 MG/DL (ref 65–99)
GLUCOSE SERPL-MCNC: 97 MG/DL (ref 65–140)
HCT VFR BLD AUTO: 31.9 % (ref 36.5–49.3)
HGB BLD-MCNC: 10.7 G/DL (ref 12–17)
MAGNESIUM SERPL-MCNC: 1.8 MG/DL (ref 1.6–2.6)
MAX DIASTOLIC BP: 72 MMHG
MAX DIASTOLIC BP: 72 MMHG
MAX HEART RATE: 141 BPM
MAX HEART RATE: 141 BPM
MAX HR PERCENT: 98 %
MAX HR: 141 BPM
MAX PREDICTED HEART RATE: 143 BPM
MAX PREDICTED HEART RATE: 143 BPM
MAX. SYSTOLIC BP: 170 MMHG
MAX. SYSTOLIC BP: 170 MMHG
MCH RBC QN AUTO: 30.3 PG (ref 26.8–34.3)
MCHC RBC AUTO-ENTMCNC: 33.2 G/DL (ref 31.4–37.4)
MCV RBC AUTO: 91 FL (ref 82–98)
NUC STRESS EJECTION FRACTION: 52 %
P AXIS: 65 DEGREES
P AXIS: 80 DEGREES
PLATELET # BLD AUTO: 102 THOUSANDS/UL (ref 149–390)
PMV BLD AUTO: 10.4 FL (ref 8.9–12.7)
POTASSIUM SERPL-SCNC: 4.1 MMOL/L (ref 3.5–5.3)
PR INTERVAL: 136 MS
PR INTERVAL: 140 MS
PROTOCOL NAME: NORMAL
PROTOCOL NAME: NORMAL
QRS AXIS: 13 DEGREES
QRS AXIS: 87 DEGREES
QRSD INTERVAL: 84 MS
QRSD INTERVAL: 86 MS
QT INTERVAL: 336 MS
QT INTERVAL: 386 MS
QTC INTERVAL: 413 MS
QTC INTERVAL: 420 MS
RATE PRESSURE PRODUCT: NORMAL
RBC # BLD AUTO: 3.5 MILLION/UL (ref 3.88–5.62)
REASON FOR TERMINATION: NORMAL
REASON FOR TERMINATION: NORMAL
SL CV REST NUCLEAR ISOTOPE DOSE: 10.2 MCI
SL CV STRESS NUCLEAR ISOTOPE DOSE: 31 MCI
SL CV STRESS RECOVERY BP: NORMAL MMHG
SL CV STRESS RECOVERY HR: 85 BPM
SL CV STRESS RECOVERY O2 SAT: 99 %
SL CV STRESS STAGE REACHED: 2
SODIUM SERPL-SCNC: 141 MMOL/L (ref 135–147)
STRESS ANGINA INDEX: 0
STRESS BASELINE BP: NORMAL MMHG
STRESS BASELINE HR: 83 BPM
STRESS O2 SAT REST: 99 %
STRESS PEAK HR: 139 BPM
STRESS POST ESTIMATED WORKLOAD: 7 METS
STRESS POST EXERCISE DUR MIN: 6 MIN
STRESS POST EXERCISE DUR SEC: 0 SEC
STRESS POST O2 SAT PEAK: 99 %
STRESS POST PEAK BP: 150 MMHG
T WAVE AXIS: 16 DEGREES
T WAVE AXIS: 54 DEGREES
TARGET HR FORMULA: NORMAL
TARGET HR FORMULA: NORMAL
TEST INDICATION: NORMAL
TEST INDICATION: NORMAL
TIME IN EXERCISE PHASE: NORMAL
TIME IN EXERCISE PHASE: NORMAL
VENTRICULAR RATE: 69 BPM
VENTRICULAR RATE: 94 BPM
WBC # BLD AUTO: 5.5 THOUSAND/UL (ref 4.31–10.16)

## 2022-08-30 PROCEDURE — A9502 TC99M TETROFOSMIN: HCPCS

## 2022-08-30 PROCEDURE — 93018 CV STRESS TEST I&R ONLY: CPT | Performed by: INTERNAL MEDICINE

## 2022-08-30 PROCEDURE — 93005 ELECTROCARDIOGRAM TRACING: CPT

## 2022-08-30 PROCEDURE — 93010 ELECTROCARDIOGRAM REPORT: CPT | Performed by: INTERNAL MEDICINE

## 2022-08-30 PROCEDURE — 78452 HT MUSCLE IMAGE SPECT MULT: CPT

## 2022-08-30 PROCEDURE — 93017 CV STRESS TEST TRACING ONLY: CPT

## 2022-08-30 PROCEDURE — 85730 THROMBOPLASTIN TIME PARTIAL: CPT | Performed by: FAMILY MEDICINE

## 2022-08-30 PROCEDURE — 80048 BASIC METABOLIC PNL TOTAL CA: CPT | Performed by: FAMILY MEDICINE

## 2022-08-30 PROCEDURE — G1004 CDSM NDSC: HCPCS

## 2022-08-30 PROCEDURE — 83735 ASSAY OF MAGNESIUM: CPT | Performed by: FAMILY MEDICINE

## 2022-08-30 PROCEDURE — 85027 COMPLETE CBC AUTOMATED: CPT | Performed by: FAMILY MEDICINE

## 2022-08-30 PROCEDURE — 93016 CV STRESS TEST SUPVJ ONLY: CPT | Performed by: INTERNAL MEDICINE

## 2022-08-30 PROCEDURE — 99217 PR OBSERVATION CARE DISCHARGE MANAGEMENT: CPT | Performed by: INTERNAL MEDICINE

## 2022-08-30 PROCEDURE — 99213 OFFICE O/P EST LOW 20 MIN: CPT | Performed by: INTERNAL MEDICINE

## 2022-08-30 PROCEDURE — 78452 HT MUSCLE IMAGE SPECT MULT: CPT | Performed by: INTERNAL MEDICINE

## 2022-08-30 RX ORDER — METOPROLOL SUCCINATE 25 MG/1
25 TABLET, EXTENDED RELEASE ORAL DAILY
Qty: 30 TABLET | Refills: 0 | Status: SHIPPED | OUTPATIENT
Start: 2022-08-31 | End: 2022-09-20 | Stop reason: SDUPTHER

## 2022-08-30 RX ADMIN — Medication 1000 UNITS: at 08:43

## 2022-08-30 RX ADMIN — PRAVASTATIN SODIUM 10 MG: 20 TABLET ORAL at 08:43

## 2022-08-30 RX ADMIN — FAMOTIDINE 40 MG: 20 TABLET ORAL at 08:43

## 2022-08-30 RX ADMIN — HEPARIN SODIUM 15 UNITS/KG/HR: 10000 INJECTION, SOLUTION INTRAVENOUS at 09:15

## 2022-08-30 RX ADMIN — ASPIRIN 81 MG: 81 TABLET, COATED ORAL at 08:43

## 2022-08-30 RX ADMIN — CYANOCOBALAMIN TAB 500 MCG 1000 MCG: 500 TAB at 08:43

## 2022-08-30 NOTE — PLAN OF CARE
Problem: Potential for Falls  Goal: Patient will remain free of falls  Description: INTERVENTIONS:  - Educate patient/family on patient safety including physical limitations  - Instruct patient to call for assistance with activity   - Consult OT/PT to assist with strengthening/mobility   - Keep Call bell within reach  - Keep bed low and locked with side rails adjusted as appropriate  - Keep care items and personal belongings within reach  - Initiate and maintain comfort rounds  - Offer Toileting every 2 Hours, in advance of need    Problem: PAIN - ADULT  Goal: Verbalizes/displays adequate comfort level or baseline comfort level  Description: Interventions:  - Encourage patient to monitor pain and request assistance  - Assess pain using appropriate pain scale  - Administer analgesics based on type and severity of pain and evaluate response  - Implement non-pharmacological measures as appropriate and evaluate response  - Consider cultural and social influences on pain and pain management  - Notify physician/advanced practitioner if interventions unsuccessful or patient reports new pain  Outcome: Progressing     Problem: INFECTION - ADULT  Goal: Absence or prevention of progression during hospitalization  Description: INTERVENTIONS:  - Assess and monitor for signs and symptoms of infection  - Monitor lab/diagnostic results  - Monitor all insertion sites, i e  indwelling lines, tubes, and drains  - [place] appropriate cooling/warming therapies per order  - Administer medications as ordered  - Instruct and encourage patient and family to use good hand hygiene technique  - Identify and instruct in appropriate isolation precautions for identified infection/condition  Outcome: Progressing  Goal: Absence of fever/infection during neutropenic period  Description: INTERVENTIONS:  - Monitor WBC    Outcome: Progressing     Goal: Maintain or return to baseline ADL function  Description: INTERVENTIONS:  -  Assess patient's ability to carry out ADLs; assess patient's baseline for ADL function and identify physical deficits which impact ability to perform ADLs (bathing, care of mouth/teeth, toileting, grooming, dressing, etc )  - Assess/evaluate cause of self-care deficits   - Assess range of motion  - Assess patient's mobility; develop plan if impaired  - Assess patient's need for assistive devices and provide as appropriate  - Encourage maximum independence but intervene and supervise when necessary  - Involve family in performance of ADLs  - Assess for home care needs following discharge   - Consider OT consult to assist with ADL evaluation and planning for discharge  - Provide patient education as appropriate  Outcome: Progressing     Problem: DISCHARGE PLANNING  Goal: Discharge to home or other facility with appropriate resources  Description: INTERVENTIONS:  - Identify barriers to discharge w/patient and caregiver  - Arrange for needed discharge resources and transportation as appropriate  - Identify discharge learning needs (meds, wound care, etc )  - Arrange for interpretive services to assist at discharge as needed  - Refer to Case Management Department for coordinating discharge planning if the patient needs post-hospital services based on physician/advanced practitioner order or complex needs related to functional status, cognitive ability, or social support system  Outcome: Progressing     Problem: Knowledge Deficit  Goal: Patient/family/caregiver demonstrates understanding of disease process, treatment plan, medications, and discharge instructions  Description: Complete learning assessment and assess knowledge base    Interventions:  - Provide teaching at level of understanding  - Provide teaching via preferred learning methods  Outcome: Progressing     Problem: Prexisting or High Potential for Compromised Skin Integrity  Goal: Skin integrity is maintained or improved  Description: INTERVENTIONS:  - Identify patients at risk for skin breakdown  - Assess and monitor skin integrity  - Assess and monitor nutrition and hydration status  - Monitor labs   - Assess for incontinence   - Turn and reposition patient  - Assist with mobility/ambulation  - Relieve pressure over bony prominences  - Avoid friction and shearing  - Provide appropriate hygiene as needed including keeping skin clean and dry  - Evaluate need for skin moisturizer/barrier cream  - Collaborate with interdisciplinary team   - Patient/family teaching  - Consider wound care consult   Outcome: Progressing

## 2022-08-30 NOTE — ASSESSMENT & PLAN NOTE
Lab Results   Component Value Date    EGFR 37 08/29/2022    EGFR 46 07/12/2022    EGFR 54 10/05/2021    CREATININE 1 72 (H) 08/29/2022    CREATININE 1 43 (H) 07/12/2022    CREATININE 1 28 10/05/2021     Creatinine appears mildly elevated compared to baseline  · Received fluid boluses low in the ER due to hypotension  · Gentle IV fluids  · Repeat lab work in a m

## 2022-08-30 NOTE — PLAN OF CARE
Problem: CARDIOVASCULAR - ADULT  Goal: Maintains optimal cardiac output and hemodynamic stability  Description: INTERVENTIONS:  - Monitor I/O, vital signs and rhythm  - Monitor for S/S and trends of decreased cardiac output  - Administer and titrate ordered vasoactive medications to optimize hemodynamic stability  - Assess quality of pulses, skin color and temperature  - Assess for signs of decreased coronary artery perfusion  - Instruct patient to report change in severity of symptoms  Outcome: Progressing     Problem: PAIN - ADULT  Goal: Verbalizes/displays adequate comfort level or baseline comfort level  Description: Interventions:  - Encourage patient to monitor pain and request assistance  - Assess pain using appropriate pain scale  - Administer analgesics based on type and severity of pain and evaluate response  - Implement non-pharmacological measures as appropriate and evaluate response  - Consider cultural and social influences on pain and pain management  - Notify physician/advanced practitioner if interventions unsuccessful or patient reports new pain  8/30/2022 0910 by Dayana Wiley RN  Outcome: Progressing  8/30/2022 0910 by Dayana Wiley RN  Outcome: Progressing

## 2022-08-30 NOTE — ASSESSMENT & PLAN NOTE
Lab Results   Component Value Date    EGFR 59 08/30/2022    EGFR 37 08/29/2022    EGFR 46 07/12/2022    CREATININE 1 18 08/30/2022    CREATININE 1 72 (H) 08/29/2022    CREATININE 1 43 (H) 07/12/2022     Creatinine appears mildly elevated compared to baseline  · Received fluid boluses in the ED  Creatinine close to baseline

## 2022-08-30 NOTE — ASSESSMENT & PLAN NOTE
Patient presented to the ER with complaints palpitations, burning sensation in his chest was started at around 4:00 a m   Along with brief episode of lightheadedness  · In the ER patient was noted to be in rapid AFib  · Received IV Cardizem x1 along with fluid boluses due to hypotension  · Patient later converted to sinus rhythm  · Patient has been on IV heparin drip and will be transitioned to Xarelto upon discharge  · TSH within normal limits  · Chest x-ray showed possible lung nodule but CT scan shows no nodule

## 2022-08-30 NOTE — ASSESSMENT & PLAN NOTE
Troponin peak of 68, likely due to rapid AFib  Patient underwent nuclear stress test which was normal without any evidence of ischemia

## 2022-08-30 NOTE — DISCHARGE SUMMARY
[address]  Discharge- Titus Thurston Reger 1945, 67 y o  male MRN: [id number]  Unit/Bed#: [address] [place] [address] Encounter: [id number]  Primary Care Provider: Landry Obando MD   Date and time admitted to hospital: 8/29/2022  5:01 AM    * Atrial fibrillation with RVR [facility])  Assessment & Plan  Patient presented to the ER with complaints palpitations, burning sensation in his chest was started at around 4:00 a m  Along with brief episode of lightheadedness  · In the ER patient was noted to be in rapid AFib  · Received IV Cardizem x1 along with fluid boluses due to hypotension  · Patient later converted to sinus rhythm  · Patient has been on IV heparin drip and will be transitioned to Xarelto upon discharge  · TSH within normal limits  · Chest x-ray showed possible lung nodule but CT scan shows no nodule    Stage 3a chronic kidney disease [facility])  Assessment & Plan  Lab Results   Component Value Date    EGFR 59 08/30/2022    EGFR 37 08/29/2022    EGFR 46 07/12/2022    CREATININE 1 18 08/30/2022    CREATININE 1 72 (H) 08/29/2022    CREATININE 1 43 (H) 07/12/2022     Creatinine appears mildly elevated compared to baseline  · Received fluid boluses in the ED  Creatinine close to baseline      Elevated troponin  Assessment & Plan  Troponin peak of 68, likely due to rapid AFib  Patient underwent nuclear stress test which was normal without any evidence of ischemia    Hypercholesteremia  Assessment & Plan  Continue Pravachol    Shin esophagus  Assessment & Plan  Continue Pepcid      Medical Problems             Resolved Problems  Date Reviewed: 8/30/2022   None               Discharging Physician / Practitioner: Meet Rodriguez MD  PCP: Landry Obando MD  Admission Date:   Admission Orders (From admission, onward)     Ordered        08/29/22 0710  Place in Observation  Once            08/29/22 0708  INPATIENT ADMISSION  Once,   Status:  Canceled                      Discharge Date: 08/30/22    Consultations During Hospital Stay:  · Cardiology    Procedures Performed:   · Nuclear stress test showed no evidence of ischemia with EF of 52%       Outpatient Tests Requested:  · Follow-up with Cardiology    Complications:  None    Reason for Admission:  Palpitations, chest burning    Hospital Course:   Vernon Guillory is a 67 y o  male patient with past medical history of Shin's esophagus, BPH, hyperlipidemia who originally presented to the hospital on 8/29/2022 due to burning sensation in the chest along with palpitations  Patient noted to be in rapid atrial fibrillation and received IV Lopressor and Cardizem and patient also had low blood pressure  Patient received fluid boluses for the low blood pressure  Later patient was seen by Cardiology and Cardizem drip was discontinued  Later patient converted to sinus rhythm  Patient elevated troponin and patient underwent nuclear stress test which was normal   Patient also received IV heparin and will be transitioned to Xarelto upon discharge  Patient will need outpatient follow-up with Cardiology  Please see above list of diagnoses and related plan for additional information  Condition at Discharge: stable    Discharge Day Visit / Exam:   Subjective:  Patient is feeling well and anxious to go home  Vitals: Blood Pressure: 120/70 (08/30/22 1022)  Pulse: 74 (08/30/22 1245)  Temperature: 98 5 °F (36 9 °C) (08/30/22 0731)  Temp Source: Oral (08/30/22 0731)  Respirations: 18 (08/30/22 0731)  Height: 5' 9" (175 3 cm) (08/30/22 1022)  Weight - Scale: 63 5 kg (140 lb) (08/30/22 1022)  SpO2: 97 % (08/30/22 1245)  Exam:   Physical Exam  Constitutional:       Appearance: Normal appearance  HENT:      Head: Normocephalic and atraumatic  Eyes:      Extraocular Movements: Extraocular movements intact  Pupils: Pupils are equal, round, and reactive to light  Cardiovascular:      Rate and Rhythm: Normal rate and regular rhythm        Heart sounds: No murmur heard  No gallop  Pulmonary:      Effort: Pulmonary effort is normal       Breath sounds: Normal breath sounds  Abdominal:      General: Bowel sounds are normal       Palpations: Abdomen is soft  Tenderness: There is no abdominal tenderness  Musculoskeletal:         General: No swelling or deformity  Normal range of motion  Cervical back: Normal range of motion and neck supple  Skin:     General: Skin is warm and dry  Neurological:      General: No focal deficit present  Mental Status: He is alert  Discharge instructions/Information to patient and family:   See after visit summary for information provided to patient and family  Provisions for Follow-Up Care:  See after visit summary for information related to follow-up care and any pertinent home health orders  Disposition:   Home    Planned Readmission: No     Discharge Statement:  I spent 25  minutes discharging the patient  This time was spent on the day of discharge  I had direct contact with the patient on the day of discharge  Greater than 50% of the total time was spent examining patient, answering all patient questions, arranging and discussing plan of care with patient as well as directly providing post-discharge instructions  Additional time then spent on discharge activities  Discharge Medications:  See after visit summary for reconciled discharge medications provided to patient and/or family        **Please Note: This note may have been constructed using a voice recognition system**

## 2022-08-30 NOTE — ASSESSMENT & PLAN NOTE
Patient presented to the ER with complaints palpitations, burning sensation in his chest was started at around 4:00 a m   Along with brief episode of lightheadedness  · In the ER patient was noted to be in rapid AFib  · Received IV Cardizem x1 along with fluid boluses due to hypotension  · Keep NPO for cardioversion  · Continue heparin drip  · Due to soft BPs, will hold off on Cardizem drip per Cardiology  · TSH within normal limits  · Chest x-ray showed possible lung nodule but CT scan shows no nodule

## 2022-08-30 NOTE — CASE MANAGEMENT
Case Management Progress Note    Patient name Frank Cardenas  [facility] [address] MRN [id number]  : 1945 Date 2022       LOS (days): 1  Geometric Mean LOS (GMLOS) (days):   Days to [address]:        OBJECTIVE:     Current admission status: Observation  Preferred Pharmacy:   [address] [facility] [address] [facility] [address]  [place] [id number] [id number]  Phone: [phone] Fax: [phone]    Primary Care Provider: Prasanth Alvarado MD    Primary Insurance: MEDICARE  Secondary Insurance: [facility]    PROGRESS NOTE:    SW notified by Attending that pt will be medically cleared for discharge today  SW requested to assist with confirming Eliquis copay  SW called [facility] and confirmed medication is not formulary and recommended Xarelto or Coumadin  Attending sent prescription for Xarelto  [facility] noted copay is $490  SW called [facility] [employer] [phone] (pt's prescription plan) and spoke with rep Smith  He confirmed that Xarelto 20mg QD should have a copay of $77 45  He has not met his deductible  He should meet his deductible after this initial OOP cost  Mikey Bolden also confirmed that Pradaxa is also non-formulary  SW went to speak with patient to advise; however, patient already left  SW called and left information regarding above and encouraged pt to callback to discuss further if needed

## 2022-08-30 NOTE — PROGRESS NOTES
Daily Cardiology Progress Note              LOS: 1 day     Assessment/Plan     1  Paroxysmal atrial fibrillation - patient spontaneously converted to sinus rhythm yesterday prior to zeus/cardioversion  Has remained sinus rhythm since then  - discussed atrial fibrillation with patient in detail  Upon discharge, he will use Eliquis 5 mg b i d  Along with metoprolol 25 mg daily  - follow-up with Dr Antonio Patel in 1-2 months   - discuss risk and benefits of anticoagulation  He is aware of his stroke risk and benefit of using Eliquis to prevent thrombus/CVA  He is aware of increased bleeding risk while on blood thinner therapy  - discontinue aspirin  2  Abnormal troponin - non myocardial infarction troponin elevation due to tachycardia  Patient did not have any symptoms of acute coronary syndrome  - stress test was done today which was normal   There is no evidence of ischemia or infarction on stress test   - continue to work on risk factor modification with Dr Antonio Patel  3  Hypertension - started on metoprolol  4  Chronic kidney disease - creatinine returned to baseline with IV fluids  Patient may be discharged home  Discussed with hospitalist       Subjective     Interval History: no further episodes of atrial fibrillation - patient denies any chest pain or shortness of breath  Objective     Vital signs in last 24 hours:  Temp:  [97 7 °F (36 5 °C)-99 8 °F (37 7 °C)] 98 5 °F (36 9 °C)  HR:  [66-83] 74  Resp:  [18-20] 18  BP: ()/(57-70) 120/70  Weight (last 2 days)     Date/Time Weight    08/30/22 1022 63 5 (140)    08/29/22 0510 63 5 (140)           Intake/Output last 3 shifts:  I/O last 3 completed shifts: In: 2500 [IV Piggyback:2500]  Out: -   Intake/Output this shift:  I/O this shift:  In: 240 [P O :240]  Out: -       Physical Exam:  Physical Exam   Constitutional: He appears healthy  No distress  Eyes: Pupils are equal, round, and reactive to light   Conjunctivae are normal    Neck: No JVD present  Cardiovascular: Normal rate, regular rhythm and normal heart sounds  Exam reveals no gallop and no friction rub  No murmur heard  Pulmonary/Chest: Effort normal and breath sounds normal  He has no wheezes  He has no rales  Musculoskeletal:         General: No tenderness, deformity or edema  Cervical back: Normal range of motion and neck supple  Neurological: He is alert and oriented to person, place, and time  Skin: Skin is warm and dry  Lab Results: I have personally reviewed pertinent lab results  Imaging: I have personally reviewed pertinent films in PACS  EKG/Tele: Reviewed   No atrial fibrillation

## 2022-09-01 LAB
ATRIAL RATE: 94 BPM
P AXIS: 80 DEGREES
PR INTERVAL: 140 MS
QRS AXIS: 87 DEGREES
QRSD INTERVAL: 86 MS
QT INTERVAL: 336 MS
QTC INTERVAL: 420 MS
T WAVE AXIS: 54 DEGREES
VENTRICULAR RATE: 94 BPM

## 2022-09-01 PROCEDURE — 93010 ELECTROCARDIOGRAM REPORT: CPT | Performed by: INTERNAL MEDICINE

## 2022-09-20 ENCOUNTER — OFFICE VISIT (OUTPATIENT)
Dept: CARDIOLOGY CLINIC | Facility: CLINIC | Age: 77
End: 2022-09-20
Payer: MEDICARE

## 2022-09-20 VITALS
DIASTOLIC BLOOD PRESSURE: 60 MMHG | OXYGEN SATURATION: 98 % | SYSTOLIC BLOOD PRESSURE: 104 MMHG | TEMPERATURE: 97 F | HEART RATE: 58 BPM | WEIGHT: 141 LBS | HEIGHT: 69 IN | BODY MASS INDEX: 20.88 KG/M2

## 2022-09-20 DIAGNOSIS — I48.0 PAROXYSMAL ATRIAL FIBRILLATION (HCC): ICD-10-CM

## 2022-09-20 DIAGNOSIS — E78.5 DYSLIPIDEMIA: ICD-10-CM

## 2022-09-20 DIAGNOSIS — I48.91 ATRIAL FIBRILLATION (HCC): ICD-10-CM

## 2022-09-20 DIAGNOSIS — R94.31 ABNORMAL EKG: ICD-10-CM

## 2022-09-20 DIAGNOSIS — N18.30 STAGE 3 CHRONIC KIDNEY DISEASE, UNSPECIFIED WHETHER STAGE 3A OR 3B CKD (HCC): ICD-10-CM

## 2022-09-20 DIAGNOSIS — R00.1 BRADYCARDIA: ICD-10-CM

## 2022-09-20 PROCEDURE — 99214 OFFICE O/P EST MOD 30 MIN: CPT | Performed by: INTERNAL MEDICINE

## 2022-09-20 PROCEDURE — 93000 ELECTROCARDIOGRAM COMPLETE: CPT | Performed by: INTERNAL MEDICINE

## 2022-09-20 RX ORDER — METOPROLOL SUCCINATE 25 MG/1
25 TABLET, EXTENDED RELEASE ORAL DAILY
Qty: 90 TABLET | Refills: 1 | Status: SHIPPED | OUTPATIENT
Start: 2022-09-20

## 2022-09-20 NOTE — PROGRESS NOTES
Personal Note - Cardiology Office  [facility]  Liset Cardenas 67 y o  male MRN: [id number]  : 1945  Unit/Bed#:  Encounter: [id number]      Assessment:     1  Paroxysmal atrial fibrillation (HCC)    2  Dyslipidemia    3  Stage 3 chronic kidney disease, unspecified whether stage 3a or 3b CKD ([facility] [address] )    4  Bradycardia    5  Abnormal EKG    6  Atrial fibrillation ([facility] )        Discussion summary and Plan:    1  Paroxysmal atrial fibrillation  Patient was recently admitted to hospital with atrial fibrillation rapid ventricular rate  He spontaneously converted back to sinus rhythm current heart rate 58 beats per minute he is on very low-dose metoprolol and Xarelto  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 but for now he is maintaining sinus rhythm with metoprolol will continue Toprol XL 25 mg daily  2  Mildly elevated troponin  Nuclear stress test shows no ischemia EF 52%    3  Dyslipidemia  His risk for cardiovascular event is high he is on pravastatin 10 mg daily  Repeat labs reviewed    4  Cardiac murmur  Echo Doppler from 2019 reviewed mild valvular disease  Discussed with patient  No new changes    5  Abnormal EKG with ST flattening in inferior leads with exercise stress test shows no significant EKG abnormality  Nuclear stress test reviewed    6  History of anemia with CKD stage 2/3  Patient was admitted at that time his creatinine was elevated he is encouraged to keep himself hydrated  All those issues discussed with patient at length he agrees with the plan further plan as also of these tests become available  Issues related to blood thinner Xarelto discussed understand risk involved  Thank you for your consultation  If you have any question please call me at [phone]    Counseling :  A description of the counseling  Goals and Barriers    Patient's ability to self care: Yes  Medication side effect reviewed with patient in detail and all their questions answered to their satisfaction  Primary Care Physician: Rico Melton MD      HPI :     Zoraida Ramey is a 67y o  year old male who was referred by primary care doctor for irregular heart beat and racing of high blood pressure  Patient who monitors his blood pressure and heart rate regularly noted that his blood pressure which generally runs around 100-110 was higher and his monitor was also giving irregular heartbeat  He has history of irregular heartbeat and he felt occasionally palpitations and fast heartbeat  He was started on low-dose metoprolol which have significantly decrease his palpitations as well as irregular heartbeat on his monitor and his blood pressure has also improved  He was sent to us for further cardiac workup  He denies any new symptoms no chest pain no shortness of breath no dizziness no lightheadedness no other issues  He does have history of cardiac murmur and has an echo done and found to have mild valvular disease  There is a family history of cardiac problem as his father  suddenly at age of 80  He does not smoke he is a social drinker is very active he does lot of hiking  He has no recent surgery  He lives alone and he has 2 kids who helped him  His cholesterol profile reviewed  His risk for cardiovascular event is around 20% mostly driven by his age  He does have history of anxiety particularly the current situation of the currently bothersome lot  He has blood test done which was reviewed  2020  Above reviewed  Patient came for follow-up  He is doing well no new complaints  His stress test was normal   Holter monitor shows patient has few PACs as well as PVCs there were less than 2% of the total beats  Short atrial runs were noted  He still occasionally gets palpitations  He does lot of hiking average heart rate was 73 beats per minute    Otherwise he is doing well no nausea no vomiting no PND no orthopnea his echo has previously shown normal LV systolic function no other issues at this time  02/08/2022    Above reviewed  Patient came for follow-up he is doing well he has no new complaints  His PACs and PVC burden less than 2% short atrial runs were noted  His blood pressure is acceptable he had blood work done January 2022 which is acceptable cholesterol medicine was slightly increased  No nausea no vomiting no fever no chills today EKG shows T-wave inversions in inferior lead which is not changed from previous EKGs no other cardiovascular issues at this time  His kidney function has been stable  09/20/2022  Above reviewed  Patient came for follow-up he was recently admitted to [facility] in August 2022 with complaints of palpitation and was found to be in AFib with rapid ventricular rate  He spontaneously went back to sinus rhythm and was added on low-dose metoprolol and Xarelto  Today his heart rate is 58 beats per minute  He has nuclear stress test at that time shows no ischemia EF was 52%  He has mildly elevated troponin  He is tolerating these medications well  He still occasionally get palpitations but they are not as problem he is compliant with his medications  No nausea no vomiting no other cardiovascular issues at this time  He brought his blood pressure diary most of the readings are between  and heart rate has been acceptable  He is encouraged to drink more fluid when he was admitted he was dehydrated  Review of Systems   Constitutional: Negative for activity change, chills, diaphoresis, fever and unexpected weight change  HENT: Negative for congestion  Eyes: Negative for discharge and redness  Respiratory: Negative for cough, chest tightness, shortness of breath and wheezing  Cardiovascular: Negative  Negative for chest pain, palpitations and leg swelling  Gastrointestinal: Negative for abdominal pain, diarrhea and nausea  Endocrine: Negative      Genitourinary: Negative for decreased urine volume and urgency  Musculoskeletal: Negative  Negative for arthralgias, back pain and gait problem  Skin: Negative for rash and wound  Allergic/Immunologic: Negative  Neurological: Negative for dizziness, seizures, syncope, weakness, light-headedness and headaches  Hematological: Negative  Psychiatric/Behavioral: Negative for agitation and confusion  The patient is nervous/anxious          Historical Information   Past Medical History:   Diagnosis Date    Shin's esophagus     BPH (benign prostatic hyperplasia)     Colon polyp     GERD (gastroesophageal reflux disease)     Herpes     Hyperlipidemia     Irregular heart beat     Kidney stone      Past Surgical History:   Procedure Laterality Date    CATARACT EXTRACTION      COLONOSCOPY      EGD AND COLONOSCOPY      LITHOTRIPSY       Social History     Substance and Sexual Activity   Alcohol Use Yes    Alcohol/week: 1 0 - 2 0 standard drink    Types: 1 - 2 Glasses of wine per week    Comment: occas     Social History     Substance and Sexual Activity   Drug Use No     Social History     Tobacco Use   Smoking Status Never Smoker   Smokeless Tobacco Never Used     Family History:   Family History   Problem Relation Age of Onset    No Known Problems Mother     No Known Problems Father        Meds/Allergies     No Known Allergies    Current Outpatient Medications:     cholecalciferol (VITAMIN D3) 1,000 units tablet, Take 1,000 Units by mouth daily, Disp: , Rfl:     famotidine (PEPCID) 40 MG tablet, Take 1 tablet (40 mg total) by mouth daily, Disp: 90 tablet, Rfl: 1    metoprolol succinate (TOPROL-XL) 25 mg 24 hr tablet, Take 1 tablet (25 mg total) by mouth daily, Disp: 90 tablet, Rfl: 1    pravastatin (PRAVACHOL) 10 mg tablet, Take 1 tablet (10 mg total) by mouth daily, Disp: 90 tablet, Rfl: 3    rivaroxaban (Xarelto) 20 mg tablet, Take 1 tablet (20 mg total) by mouth daily with breakfast, Disp: 90 tablet, Rfl: 1   tamsulosin (FLOMAX) 0 4 mg, Take 1 capsule (0 4 mg total) by mouth daily with dinner, Disp: 90 capsule, Rfl: 1    vitamin B-12 (VITAMIN B-12) 1,000 mcg tablet, Take 1,000 mcg by mouth daily, Disp: , Rfl:     Vitals: Blood pressure 104/60, pulse 58, temperature (!) 97 °F (36 1 °C), height 5' 9" (1 753 m), weight 64 kg (141 lb), SpO2 98 %  ?  Body mass index is 20 82 kg/m²  Vitals:    09/20/22 1401   Weight: 64 kg (141 lb)     BP Readings from Last 3 Encounters:   09/20/22 104/60   08/30/22 120/70   07/18/22 130/70         Physical Exam  Constitutional:       General: He is not in acute distress  Appearance: He is well-developed  He is not diaphoretic  Neck:      Thyroid: No thyromegaly  Vascular: No JVD  Trachea: No tracheal deviation  Cardiovascular:      Rate and Rhythm: Normal rate and regular rhythm  Heart sounds: S1 normal and S2 normal  Heart sounds not distant  Murmur heard  Systolic (ejection) murmur is present with a grade of 2/6  No friction rub  No gallop  No S3 or S4 sounds  Pulmonary:      Effort: Pulmonary effort is normal  No respiratory distress  Breath sounds: Normal breath sounds  No wheezing or rales  Chest:      Chest wall: No tenderness  Abdominal:      General: Bowel sounds are normal  There is no distension  Palpations: Abdomen is soft  Tenderness: There is no abdominal tenderness  Musculoskeletal:         General: No deformity  Cervical back: Neck supple  Skin:     General: Skin is warm and dry  Coloration: Skin is not pale  Findings: No rash  Neurological:      Mental Status: He is alert and oriented to person, place, and time     Psychiatric:         Behavior: Behavior normal          Judgment: Judgment normal          Diagnostic Studies Review Cardio:  Echo reviewed    Two thousand nineteen shows EF 60%, left atrium moderately dilated, no significant valvular disease other than trace MR trace AI and trace TR     Exercise stress test   Exercise stress test done 2020 shows normal exercise stress test   He exercised for about 6 minutes  Nuclear stress test 2022 was normal with EF around 52%        Holter monitor  Holter monitor shows dominant rhythm is sinus rhythm few PACs and PVCs there were less than 2% of the total beats  Short atrial runs were noted there were nonsustained  EKG:  Normal sinus rhythm heart rate 67 beats per minute  Minimal voltage for LVH T-wave abnormality in inferior leads  Twelve lead EKG done on 2022 shows normal sinus rhythm with sinus arrhythmia heart rate 61 beats per minute LVH by voltage  T-wave inversion noted in inferior leads  No change from previous EKG    Lead EKG 2022 shows normal sinus rhythm heart rate 58 beats per minute LVH by voltage T-wave inversion inferior leads  Cardiac testing:   Results for orders placed during the hospital encounter of 19   Echo complete with contrast if indicated    [address], [place] [address]  [phone]    Transthoracic Echocardiogram  2D, M-mode, Doppler, and Color Doppler    Study date:  24-May-2019    Patient: Loyda Hernández  MR number: [id number]  Account number: [de-identified]  : 1945  Age: 75 years  Gender: Male  Status: Outpatient  Location: Echo lab  Height: 70 in  Weight: 149 6 lb  BP: 110/ 70 mmHg    Indications: CVA    Diagnoses: I67 9 - Cerebrovascular disease, unspecified    Sonographer:  JELANI Jiang  Primary Physician: Gloria Velasquez MD  Referring Physician: Gloria Velasquez MD  Group:  [facility]  Interpreting Physician:  Shaun Mobley MD    SUMMARY    LEFT VENTRICLE:  Systolic function was normal  Ejection fraction was estimated in the range of 55 % to 60 % to be 60 %  There were no regional wall motion abnormalities    Doppler parameters were consistent with abnormal left ventricular relaxation (grade 1 diastolic dysfunction)  LEFT ATRIUM:  The atrium was moderately dilated  RIGHT ATRIUM:  The atrium was mildly dilated  MITRAL VALVE:  There was trace regurgitation  AORTIC VALVE:  There was trace regurgitation  TRICUSPID VALVE:  There was trace regurgitation  The tricuspid jet envelope definition was inadequate for estimation of RV systolic pressure  There are no indirect findings (abnormal RV volume or geometry, altered pulmonary flow velocity profile, or leftward septal displacement) which  would suggest moderate or severe pulmonary hypertension  HISTORY: PRIOR HISTORY: CKD, BPH, GERD, Herpes    PROCEDURE: The procedure was performed in the echo lab  This was a routine study  The transthoracic approach was used  The study included complete 2D imaging, M-mode, complete spectral Doppler, and color Doppler  The heart rate was 68 bpm,  at the start of the study  Image quality was adequate  LEFT VENTRICLE: Size was normal  Systolic function was normal  Ejection fraction was estimated in the range of 55 % to 60 % to be 60 %  There were no regional wall motion abnormalities  Wall thickness was normal  No evidence of apical  thrombus  DOPPLER: Doppler parameters were consistent with abnormal left ventricular relaxation (grade 1 diastolic dysfunction)  RIGHT VENTRICLE: The size was normal  Systolic function was normal  Wall thickness was normal     LEFT ATRIUM: The atrium was moderately dilated  RIGHT ATRIUM: The atrium was mildly dilated  MITRAL VALVE: There was mild thickening  There was normal leaflet separation  DOPPLER: The transmitral velocity was within the normal range  There was no evidence for stenosis  There was trace regurgitation  AORTIC VALVE: The valve was trileaflet  Leaflets exhibited mildly increased thickness, mild calcification, and normal cuspal separation  DOPPLER: Transaortic velocity was within the normal range  There was no evidence for stenosis  There  was trace regurgitation  TRICUSPID VALVE: The valve structure was normal  There was normal leaflet separation  DOPPLER: The transtricuspid velocity was within the normal range  There was no evidence for stenosis  There was trace regurgitation  The tricuspid jet  envelope definition was inadequate for estimation of RV systolic pressure  There are no indirect findings (abnormal RV volume or geometry, altered pulmonary flow velocity profile, or leftward septal displacement) which would suggest  moderate or severe pulmonary hypertension  PULMONIC VALVE: Leaflets exhibited normal thickness, no calcification, and normal cuspal separation  DOPPLER: The transpulmonic velocity was within the normal range  There was no significant regurgitation  PERICARDIUM: There was no pericardial effusion  The pericardium was normal in appearance  AORTA: The root exhibited normal size  SYSTEMIC VEINS: IVC: The inferior vena cava was normal in size  SYSTEM MEASUREMENT TABLES    2D mode  AoR Diam 2D: 3 6 cm  LA Diam (2D): 4 5 cm  LA/Ao (2D): 1 25  FS (2D Teich): 28 2 %  IVSd (2D): 0 96 cm  LVDEV: 94 4 cmï¾³  LVESV: 42 8 cmï¾³  LVIDd(2D): 4 54 cm  LVISd (2D): 3 26 cm  LVPWd (2D): 1 cm  SV (Teich): 51 6 cmï¾³    Apical four chamber  LVEF A4C: 62 %    Unspecified Scan Mode  MV Peak A Jason: 843 mm/s  MV Peak E Jason  Mean: 747 mm/s  MVA (PHT): 3 38 cmï¾²  PHT: 65 ms  Max P mm[Hg]  V Max: 2170 mm/s  Vmax: 2370 mm/s  RA Area: 18 cmï¾²  RA Volume: 51 7 cmï¾³  TAPSE: 2 2 cm    [employer] Accredited [facility]chocardi[facility]    Prepared and electronically signed by    Tessie Terrell MD  Signed 26-May-2019 16:36:08         Imaging:  Chest X-Ray:   No Chest XR results available for this patient  CT-scan of the chest:     No CTA results available for this patient    Lab Review   Lab Results   Component Value Date    WBC 5 50 2022    HGB 10 7 (L) 2022    HCT 31 9 (L) 2022    MCV 91 2022 RDW 12 7 08/30/2022     (L) 08/30/2022     BMP:  Lab Results   Component Value Date    SODIUM 141 08/30/2022    K 4 1 08/30/2022     (H) 08/30/2022    CO2 23 08/30/2022    BUN 21 08/30/2022    CREATININE 1 18 08/30/2022    GLUC 97 08/30/2022    GLUF 97 08/30/2022    CALCIUM 7 8 (L) 08/30/2022    EGFR 59 08/30/2022    MG 1 8 08/30/2022     LFT:  Lab Results   Component Value Date    AST 39 08/29/2022    ALT 34 08/29/2022    ALKPHOS 81 08/29/2022    TP 7 5 08/29/2022    ALB 4 0 08/29/2022      Lab Results   Component Value Date    [id number] 3 728 08/29/2022     Lab Results   Component Value Date    HGBA1C 6 0 06/16/2016     Lipid Profile:   Lab Results   Component Value Date    CHOLESTEROL 190 01/13/2022    HDL 50 01/13/2022    LDLCALC 115 (H) 01/13/2022    TRIG 126 01/13/2022     Lab Results   Component Value Date    CHOLESTEROL 190 01/13/2022    CHOLESTEROL 167 06/14/2021     Lab Results   Component Value Date    CKTOTAL 130 04/29/2019    CKMB 2 7 06/23/2016    CKMBINDEX 1 3 06/23/2016           Dr Noa Aleman MD [facility]      "This note has been constructed using a voice recognition system  Therefore there may be syntax, spelling, and/or grammatical errors   Please call if you have any questions  "

## 2022-10-21 DIAGNOSIS — R12 HEART BURN: ICD-10-CM

## 2022-10-21 RX ORDER — FAMOTIDINE 40 MG/1
TABLET, FILM COATED ORAL
Qty: 90 TABLET | Refills: 0 | Status: SHIPPED | OUTPATIENT
Start: 2022-10-21

## 2022-10-24 ENCOUNTER — APPOINTMENT (OUTPATIENT)
Dept: LAB | Facility: CLINIC | Age: 77
End: 2022-10-24
Payer: MEDICARE

## 2022-10-24 ENCOUNTER — OFFICE VISIT (OUTPATIENT)
Dept: INTERNAL MEDICINE CLINIC | Facility: CLINIC | Age: 77
End: 2022-10-24
Payer: MEDICARE

## 2022-10-24 VITALS
HEART RATE: 60 BPM | BODY MASS INDEX: 21.62 KG/M2 | HEIGHT: 69 IN | SYSTOLIC BLOOD PRESSURE: 126 MMHG | DIASTOLIC BLOOD PRESSURE: 80 MMHG | OXYGEN SATURATION: 99 % | WEIGHT: 146 LBS

## 2022-10-24 DIAGNOSIS — N18.31 STAGE 3A CHRONIC KIDNEY DISEASE (HCC): Primary | ICD-10-CM

## 2022-10-24 DIAGNOSIS — D69.6 PLATELETS DECREASED (HCC): ICD-10-CM

## 2022-10-24 DIAGNOSIS — I48.91 ATRIAL FIBRILLATION, UNSPECIFIED TYPE (HCC): ICD-10-CM

## 2022-10-24 DIAGNOSIS — N20.0 NEPHROLITHIASIS: ICD-10-CM

## 2022-10-24 DIAGNOSIS — F41.9 ANXIETY: ICD-10-CM

## 2022-10-24 DIAGNOSIS — N40.0 BENIGN PROSTATIC HYPERPLASIA, UNSPECIFIED WHETHER LOWER URINARY TRACT SYMPTOMS PRESENT: ICD-10-CM

## 2022-10-24 DIAGNOSIS — Z23 ENCOUNTER FOR IMMUNIZATION: ICD-10-CM

## 2022-10-24 DIAGNOSIS — Z86.010 PERSONAL HISTORY OF COLONIC POLYPS: ICD-10-CM

## 2022-10-24 DIAGNOSIS — K21.9 GASTROESOPHAGEAL REFLUX DISEASE WITHOUT ESOPHAGITIS: ICD-10-CM

## 2022-10-24 DIAGNOSIS — E55.9 VITAMIN D INSUFFICIENCY: ICD-10-CM

## 2022-10-24 DIAGNOSIS — N18.31 STAGE 3A CHRONIC KIDNEY DISEASE (HCC): ICD-10-CM

## 2022-10-24 DIAGNOSIS — E78.00 HYPERCHOLESTEREMIA: ICD-10-CM

## 2022-10-24 DIAGNOSIS — K22.70 BARRETT'S ESOPHAGUS WITHOUT DYSPLASIA: ICD-10-CM

## 2022-10-24 PROBLEM — E44.1 MILD PROTEIN-CALORIE MALNUTRITION (HCC): Status: RESOLVED | Noted: 2022-07-18 | Resolved: 2022-10-24

## 2022-10-24 PROBLEM — R21 RASH: Status: RESOLVED | Noted: 2020-09-11 | Resolved: 2022-10-24

## 2022-10-24 LAB
25(OH)D3 SERPL-MCNC: 52.2 NG/ML (ref 30–100)
ANION GAP SERPL CALCULATED.3IONS-SCNC: 5 MMOL/L (ref 4–13)
BASOPHILS # BLD AUTO: 0.04 THOUSANDS/ÂΜL (ref 0–0.1)
BASOPHILS NFR BLD AUTO: 1 % (ref 0–1)
BUN SERPL-MCNC: 24 MG/DL (ref 5–25)
CALCIUM SERPL-MCNC: 9.4 MG/DL (ref 8.3–10.1)
CHLORIDE SERPL-SCNC: 108 MMOL/L (ref 96–108)
CO2 SERPL-SCNC: 26 MMOL/L (ref 21–32)
CREAT SERPL-MCNC: 1.5 MG/DL (ref 0.6–1.3)
CREAT UR-MCNC: 130 MG/DL
EOSINOPHIL # BLD AUTO: 0.14 THOUSAND/ÂΜL (ref 0–0.61)
EOSINOPHIL NFR BLD AUTO: 2 % (ref 0–6)
ERYTHROCYTE [DISTWIDTH] IN BLOOD BY AUTOMATED COUNT: 12.8 % (ref 11.6–15.1)
GFR SERPL CREATININE-BSD FRML MDRD: 44 ML/MIN/1.73SQ M
GLUCOSE P FAST SERPL-MCNC: 98 MG/DL (ref 65–99)
HCT VFR BLD AUTO: 43.3 % (ref 36.5–49.3)
HGB BLD-MCNC: 14.3 G/DL (ref 12–17)
IMM GRANULOCYTES # BLD AUTO: 0.03 THOUSAND/UL (ref 0–0.2)
IMM GRANULOCYTES NFR BLD AUTO: 0 % (ref 0–2)
LYMPHOCYTES # BLD AUTO: 1.76 THOUSANDS/ÂΜL (ref 0.6–4.47)
LYMPHOCYTES NFR BLD AUTO: 25 % (ref 14–44)
MAGNESIUM SERPL-MCNC: 2.2 MG/DL (ref 1.6–2.6)
MCH RBC QN AUTO: 30.4 PG (ref 26.8–34.3)
MCHC RBC AUTO-ENTMCNC: 33 G/DL (ref 31.4–37.4)
MCV RBC AUTO: 92 FL (ref 82–98)
MICROALBUMIN UR-MCNC: 6.4 MG/L (ref 0–20)
MICROALBUMIN/CREAT 24H UR: 5 MG/G CREATININE (ref 0–30)
MONOCYTES # BLD AUTO: 0.79 THOUSAND/ÂΜL (ref 0.17–1.22)
MONOCYTES NFR BLD AUTO: 11 % (ref 4–12)
NEUTROPHILS # BLD AUTO: 4.18 THOUSANDS/ÂΜL (ref 1.85–7.62)
NEUTS SEG NFR BLD AUTO: 61 % (ref 43–75)
NRBC BLD AUTO-RTO: 0 /100 WBCS
PHOSPHATE SERPL-MCNC: 2.8 MG/DL (ref 2.3–4.1)
PLATELET # BLD AUTO: 176 THOUSANDS/UL (ref 149–390)
PMV BLD AUTO: 10.8 FL (ref 8.9–12.7)
POTASSIUM SERPL-SCNC: 4.4 MMOL/L (ref 3.5–5.3)
PTH-INTACT SERPL-MCNC: 53 PG/ML (ref 18.4–80.1)
RBC # BLD AUTO: 4.71 MILLION/UL (ref 3.88–5.62)
SODIUM SERPL-SCNC: 139 MMOL/L (ref 135–147)
WBC # BLD AUTO: 6.94 THOUSAND/UL (ref 4.31–10.16)

## 2022-10-24 PROCEDURE — 80048 BASIC METABOLIC PNL TOTAL CA: CPT

## 2022-10-24 PROCEDURE — G0008 ADMIN INFLUENZA VIRUS VAC: HCPCS

## 2022-10-24 PROCEDURE — 82570 ASSAY OF URINE CREATININE: CPT

## 2022-10-24 PROCEDURE — 83735 ASSAY OF MAGNESIUM: CPT

## 2022-10-24 PROCEDURE — 82043 UR ALBUMIN QUANTITATIVE: CPT

## 2022-10-24 PROCEDURE — 82306 VITAMIN D 25 HYDROXY: CPT

## 2022-10-24 PROCEDURE — 36415 COLL VENOUS BLD VENIPUNCTURE: CPT

## 2022-10-24 PROCEDURE — 85025 COMPLETE CBC W/AUTO DIFF WBC: CPT

## 2022-10-24 PROCEDURE — 84100 ASSAY OF PHOSPHORUS: CPT

## 2022-10-24 PROCEDURE — 90662 IIV NO PRSV INCREASED AG IM: CPT

## 2022-10-24 PROCEDURE — 83970 ASSAY OF PARATHORMONE: CPT

## 2022-10-24 PROCEDURE — 99214 OFFICE O/P EST MOD 30 MIN: CPT | Performed by: INTERNAL MEDICINE

## 2022-10-24 RX ORDER — LORAZEPAM 0.5 MG/1
0.5 TABLET ORAL DAILY PRN
Qty: 5 TABLET | Refills: 0 | Status: SHIPPED | OUTPATIENT
Start: 2022-10-24

## 2022-10-24 NOTE — ASSESSMENT & PLAN NOTE
4-:  Colonoscopy:  1  Single colon polyp removed  2  Extensive diverticulosis      RECOMMENDATION:  Await pathology results  Follow up with PCP  Repeat colonoscopy in 5 years due to a personal history of colon polyps  High-fiber diet

## 2022-10-24 NOTE — ASSESSMENT & PLAN NOTE
Recently will be going to [facility]  Has a fear of flying    Requesting lorazepam     No side effect and was effective

## 2022-10-24 NOTE — PATIENT INSTRUCTIONS
Go for blood test today  Go for blood test in 3 months  Continue to follow with cardiologist     May take lorazepam 1 hour before flying though do not drive while taking medications  Follow with Consultants as per their and our suggestion    Follow up in 12 week(s) or as needed earlier    Follow all instructions as advised and discussed  Take your medications as prescribed  Call the office immediately if you experience any side effects  Ask questions if you do not understand  Keep your scheduled appointment as advised or come sooner if necessary or in doubt  Best time to call for non-urgent matter or questions on weekdays is between 9am and 12 noon  See physician for any new symptoms or worsening of current symptoms  Urgent or emergent situations call 911 and report to nearest emergency room      I spent  30 -40 minutes taking care of this patient including clinical care, conseling, collaboration, chart, lab and consultaion review as appropriate    Patient is to get labs 1 week(s) prior to next visit if advised

## 2022-10-24 NOTE — ASSESSMENT & PLAN NOTE
No heartburn  Remains on famotidine 40 mg daily    For surveillance endoscopy per gastroenterologist

## 2022-10-24 NOTE — ASSESSMENT & PLAN NOTE
nighttime frequency 3 times  Daytime frequency:  Every few hours depending on the fluid intake    Paragraphs remains on Flomax 0 4 mg daily    Lab Results   Component Value Date    PSA 1 2 03/06/2020    PSA 1 3 08/27/2019    PSA 2 1 07/18/2018

## 2022-10-24 NOTE — ASSESSMENT & PLAN NOTE
Lab Results   Component Value Date    WBC 5 50 08/30/2022    HGB 10 7 (L) 08/30/2022    HCT 31 9 (L) 08/30/2022    MCV 91 08/30/2022     (L) 08/30/2022   Continue platelet due for CBC next visit

## 2022-10-24 NOTE — PROGRESS NOTES
Name: Sathya Barrera      : 1945      MRN: [id number]  Encounter Provider: Flora Rothman MD  Encounter Date: 10/24/2022   Encounter department: [address]     1  Stage 3a chronic kidney disease (HCC)  -     Comprehensive metabolic panel; Future; Expected date: 2023  -     CBC and differential; Future  -     CBC; Future; Expected date: 10/24/2022  -     Basic metabolic panel; Future; Expected date: 10/24/2022    2  Atrial fibrillation, unspecified type [facility])  Assessment & Plan:  No chest pain palpitation PND orthopnea  Paragraphs remains under care of cardiologist   Matias Redding remains on metoprolol succinate 25 mg daily as well as the Xarelto 20 mg daily  A :  Nuclear stress test no negative for ischemia ejection fraction normal    Orders:  -     CBC; Future; Expected date: 10/24/2022  -     Basic metabolic panel; Future; Expected date: 10/24/2022    3  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  Assessment & Plan:  No heartburn  Remains on famotidine 40 mg daily  For surveillance endoscopy per gastroenterologist      4  Gastroesophageal reflux disease without esophagitis  Assessment & Plan:  No heartburn  Remains on famotidine 40 mg daily  For surveillance endoscopy per gastroenterologist      5  Vitamin D insufficiency  Assessment & Plan:  Continue vitamin-D 1000 unit daily    Orders:  -     Vitamin D 25 hydroxy; Future    6  Platelets decreased ([address] )  Assessment & Plan:  Lab Results   Component Value Date    WBC 5 50 2022    HGB 10 7 (L) 2022    HCT 31 9 (L) 2022    MCV 91 2022     (L) 2022   Continue platelet due for CBC next visit      Orders:  -     CBC and differential; Future  -     CBC; Future; Expected date: 10/24/2022  -     Basic metabolic panel; Future; Expected date: 10/24/2022    7  Personal history of colonic polyps  Assessment & Plan:  2022:  Colonoscopy:  1    Single colon polyp removed  2  Extensive diverticulosis      RECOMMENDATION:  Await pathology results  Follow up with PCP  Repeat colonoscopy in 5 years due to a personal history of colon polyps  High-fiber diet  8  Hypercholesteremia  Assessment & Plan:  Lab Results   Component Value Date    ALT 34 08/29/2022     Lab Results   Component Value Date    LDLCALC 115 (H) 01/13/2022     Lab Results   Component Value Date    CHOLESTEROL 190 01/13/2022    CHOLESTEROL 167 06/14/2021    CHOLESTEROL 173 12/21/2020     Lab Results   Component Value Date    HDL 50 01/13/2022    HDL 56 06/14/2021    HDL 52 12/21/2020     Lab Results   Component Value Date    TRIG 126 01/13/2022    TRIG 56 06/14/2021    TRIG 87 12/21/2020     Lab Results   Component Value Date    NONHDLC 140 01/13/2022    [place] 111 06/14/2021    [place] 121 12/21/2020   Mild elevation of LDL  Continue diet for the low-cholesterol due for lipid profile next visit in 3 months      Orders:  -     Comprehensive metabolic panel; Future; Expected date: 01/24/2023  -     Lipid panel; Future    9  Nephrolithiasis  Assessment & Plan:  No symptoms related to kidney stone      10  Benign prostatic hyperplasia, unspecified whether lower urinary tract symptoms present  Assessment & Plan:   nighttime frequency 3 times  Daytime frequency:  Every few hours depending on the fluid intake  Paragraphs remains on Flomax 0 4 mg daily    Lab Results   Component Value Date    PSA 1 2 03/06/2020    PSA 1 3 08/27/2019    PSA 2 1 07/18/2018           11  Anxiety  Assessment & Plan:  Recently will be going to [facility]  Has a fear of flying  Requesting lorazepam     No side effect and was effective    Orders:  -     LORazepam (Ativan) 0 5 mg tablet; Take 1 tablet (0 5 mg total) by mouth daily as needed for anxiety    12  Encounter for immunization  -     influenza vaccine, high-dose, PF 0 5 mL           Subjective     Patient is here for chronic disease management        Patient offers no specific complaint  Patient will be flying to [facility] requesting lorazepam due to fear of flying we will give it to of him recommend to take 1 tablet hour beforeflying  However if he drives he should be careful  Atrial fibrillation seen by cardiologist offers no specific complaint remains on Xarelto  Remains on metoprolol  Thrombocytopenia no red spots no petechiae no bleeding tolerating fairly well congestive appears pink will check CBC today and in 3 months remains on Xarelto  Vitamin-D remains on her 1000 unit a check next level  Hyperlipidemia LDL in the range of 107 total watching diet continue same  CKD level 3 baseline  Shin's stable the GERD stable a up-to-date with upper GI endoscopy  Personal history of of colon polyp had EGD and colonoscopy in April 2022 will continue to monitor due for EGD per gastroenterologist and colonoscopy in 5 years  Anemia clinically resolved  The    A Cologuard 06/11/2021 Negative  Colonoscopy in 3/2022: 1  Single colon polyp removed  2  Extensive diverticulosis  08/29/2022:  CBC normal except platelet 193 is CMP normal except creatinine 1 72 GFR 37 magnesium 1 9 proBNP 694 D-dimer 0 74 TSH 3 72 troponin unremarkable  08/03/2022:  Stress test see full report  08/30/2022: A CMP normal except creatinine 1 18 GFR 59 blood sugar 97 platelet 280  26/56/2853:  GFR 46, creatinine 1 43, CBC normal platelet normal  21/79/2108:  LFT normal, cholesterol 190, ,  06/14/2021 cholesterol 167 triglyceride 58 , creatinine 1 32 BUN 22 GFR 52,            Review of Systems   Constitutional: Negative for chills, fatigue, fever and unexpected weight change  HENT: Negative for ear pain, postnasal drip, sinus pain and sore throat  Eyes: Negative for pain and redness  Respiratory: Negative for cough and shortness of breath  Cardiovascular: Negative for chest pain, palpitations and leg swelling     Gastrointestinal: Negative for abdominal pain, diarrhea and nausea  Endocrine: Negative for cold intolerance, polydipsia and polyuria  Genitourinary: Negative for dysuria, frequency and urgency  Musculoskeletal: Negative for arthralgias, gait problem and myalgias  Skin: Negative for rash (No petechiae)  Allergic/Immunologic: Negative  Neurological: Negative for dizziness and headaches  Hematological: Negative for adenopathy  Psychiatric/Behavioral: Negative for behavioral problems, decreased concentration, dysphoric mood, hallucinations, self-injury and sleep disturbance  The patient is not nervous/anxious ( fear of flying)  Past Medical History:   Diagnosis Date   • Shin's esophagus    • BPH (benign prostatic hyperplasia)    • Colon polyp    • GERD (gastroesophageal reflux disease)    • Herpes    • Hyperlipidemia    • Irregular heart beat    • Kidney stone      Past Surgical History:   Procedure Laterality Date   • CATARACT EXTRACTION     • COLONOSCOPY     • EGD AND COLONOSCOPY     • LITHOTRIPSY       Family History   Problem Relation Age of Onset   • No Known Problems Mother    • No Known Problems Father      Social History     Socioeconomic History   • Marital status:      Spouse name: None   • Number of children: None   • Years of education: None   • Highest education level: None   Occupational History   • None   Tobacco Use   • Smoking status: Never Smoker   • Smokeless tobacco: Never Used   Vaping Use   • Vaping Use: Never used   Substance and Sexual Activity   • Alcohol use:  Yes     Alcohol/week: 1 0 - 2 0 standard drink     Types: 1 - 2 Glasses of wine per week     Comment: occas   • Drug use: No   • Sexual activity: None   Other Topics Concern   • None   Social History Narrative   • None     Social Determinants of Health     Financial Resource Strain: Not on file   Food Insecurity: Not on file   Transportation Needs: Not on file   Physical Activity: Not on file   Stress: Not on file   Social Connections: Not on file   Intimate Partner Violence: Not on file   Housing Stability: Not on file     Current Outpatient Medications on File Prior to Visit   Medication Sig   • cholecalciferol (VITAMIN D3) 1,000 units tablet Take 1,000 Units by mouth daily   • famotidine (PEPCID) 40 MG tablet Take 1 tablet by mouth once daily   • metoprolol succinate (TOPROL-XL) 25 mg 24 hr tablet Take 1 tablet (25 mg total) by mouth daily   • pravastatin (PRAVACHOL) 10 mg tablet Take 1 tablet (10 mg total) by mouth daily   • rivaroxaban (Xarelto) 20 mg tablet Take 1 tablet (20 mg total) by mouth daily with breakfast   • tamsulosin (FLOMAX) 0 4 mg Take 1 capsule (0 4 mg total) by mouth daily with dinner   • vitamin B-12 (VITAMIN B-12) 1,000 mcg tablet Take 1,000 mcg by mouth daily   • [DISCONTINUED] apixaban (Eliquis) 5 mg Take 1 tablet (5 mg total) by mouth 2 (two) times a day     No Known Allergies  Immunization History   Administered Date(s) Administered   • COVID-19 MODERNA VACC 0 25 ML IM BOOSTER 12/17/2021   • COVID-19 MODERNA VACC 0 5 ML IM 02/05/2021, 03/05/2021   • Influenza Split High Dose Preservative Free IM 10/18/2019   • Influenza, high dose seasonal 0 7 mL 09/18/2020, 10/05/2021, 10/24/2022   • Tdap 05/27/2021       Objective     /80   Pulse 60   Ht 5' 9" (1 753 m)   Wt 66 2 kg (146 lb)   SpO2 99%   BMI 21 56 kg/m²     Physical Exam  Vitals reviewed  Constitutional:       General: He is not in acute distress  Appearance: Normal appearance  He is not ill-appearing, toxic-appearing or diaphoretic  HENT:      Head: Normocephalic and atraumatic  Right Ear: External ear normal       Left Ear: External ear normal       Mouth/Throat:      Mouth: Mucous membranes are moist    Eyes:      General:         Right eye: No discharge  Left eye: No discharge  Extraocular Movements: Extraocular movements intact  Cardiovascular:      Rate and Rhythm: Normal rate and regular rhythm        Heart sounds: S1 normal and S2 normal  Pulmonary:      Effort: Pulmonary effort is normal       Breath sounds: Normal breath sounds  No wheezing, rhonchi or rales  Abdominal:      Palpations: Abdomen is soft  Musculoskeletal:      Cervical back: Neck supple  Right knee: No swelling, deformity, effusion, erythema or crepitus  Normal range of motion  No tenderness  No LCL laxity, MCL laxity, ACL laxity or PCL laxity  Normal meniscus  Right lower leg: No edema  Left lower leg: No edema  Skin:     General: Skin is warm and dry  Coloration: Skin is not jaundiced or pale  Findings: No rash  Neurological:      General: No focal deficit present  Mental Status: He is alert and oriented to person, place, and time  Gait: Gait normal    Psychiatric:         Mood and Affect: Mood normal          Behavior: Behavior normal          Thought Content:  Thought content normal          Judgment: Judgment normal        Sherre Lefort, MD

## 2022-10-24 NOTE — ASSESSMENT & PLAN NOTE
Lab Results   Component Value Date    ALT 34 08/29/2022     Lab Results   Component Value Date    LDLCALC 115 (H) 01/13/2022     Lab Results   Component Value Date    CHOLESTEROL 190 01/13/2022    CHOLESTEROL 167 06/14/2021    CHOLESTEROL 173 12/21/2020     Lab Results   Component Value Date    HDL 50 01/13/2022    HDL 56 06/14/2021    HDL 52 12/21/2020     Lab Results   Component Value Date    TRIG 126 01/13/2022    TRIG 56 06/14/2021    TRIG 87 12/21/2020     Lab Results   Component Value Date    NONHDLC 140 01/13/2022    [place] 111 06/14/2021    [place] 121 12/21/2020   Mild elevation of LDL    Continue diet for the low-cholesterol due for lipid profile next visit in 3 months

## 2022-10-24 NOTE — ASSESSMENT & PLAN NOTE
No chest pain palpitation PND orthopnea  Paragraphs remains under care of cardiologist   Tiffanie Mcconnell remains on metoprolol succinate 25 mg daily as well as the Xarelto 20 mg daily      A :  Nuclear stress test no negative for ischemia ejection fraction normal

## 2022-11-11 ENCOUNTER — TELEPHONE (OUTPATIENT)
Dept: NEPHROLOGY | Facility: CLINIC | Age: 77
End: 2022-11-11

## 2022-11-11 NOTE — TELEPHONE ENCOUNTER
Appointment Confirmation   Person confirmed appointment with  If not patient, name of the person Patient    Date and time of appointment 11/14  3:00    Patient acknowledged and will be at appointment? yes    Did you advise the patient that they will need a urine sample if they are a new patient?  N/A    Did you advise the patient to bring their current medications for verification? (including any OTC) Yes    Additional Information

## 2022-11-14 ENCOUNTER — OFFICE VISIT (OUTPATIENT)
Dept: NEPHROLOGY | Facility: CLINIC | Age: 77
End: 2022-11-14

## 2022-11-14 VITALS
DIASTOLIC BLOOD PRESSURE: 70 MMHG | WEIGHT: 146 LBS | BODY MASS INDEX: 21.62 KG/M2 | SYSTOLIC BLOOD PRESSURE: 120 MMHG | HEIGHT: 69 IN

## 2022-11-14 DIAGNOSIS — N20.0 NEPHROLITHIASIS: ICD-10-CM

## 2022-11-14 DIAGNOSIS — N18.31 STAGE 3A CHRONIC KIDNEY DISEASE (HCC): Primary | ICD-10-CM

## 2022-11-14 DIAGNOSIS — N28.1 RENAL CYST: ICD-10-CM

## 2022-11-14 DIAGNOSIS — E55.9 VITAMIN D INSUFFICIENCY: ICD-10-CM

## 2022-11-14 NOTE — PROGRESS NOTES
NEPHROLOGY OUTPATIENT PROGRESS NOTE   Irvin Cardenas 67 y o  male MRN: [id number]  DATE: 11/14/2022  Reason for visit:   Chief Complaint   Patient presents with   • Follow-up   • Chronic Kidney Disease     ASSESSMENT and PLAN:  Likely CKD stage III with serum creatinine baseline 1 3 -1 4  -CKD could be secondary to previous long-term use of acyclovir/omeprazole with episode of JOSEPH causing residual damage  Could have possible chronic interstitial nephritis from the previous insults   -last creatinine 1 5 in October 2022 stable close to baseline   - We will repeat BMP, UPC ratio before next visit  - Continue to drink plenty of fluid to stay hydrated  - multiple urinalysis has been bland without significant hematuria or proteinuria   Last UA in March 2022 bland  urine microalbumin/creatinine ratio nonsignificant     - Renal ultrasound in June 2016 showed normal size kidneys, normal echogenicity, no hydronephrosis, bilateral renal cysts present  -Avoid nephrotoxicity or NSAIDs     Bilateral renal cyst  -he was unable to repeat renal ultrasound due to not able to drink 24 oz of water without worrying about going to the bathroom prior ultrasound       Vitamin-D insufficiency, improved   Vitamin-D 25 hydroxy level 52 in October 2022  Currently remains on vitamin-D 1000 units p o  Daily      BPH, on Flomax     Blood pressure is controlled in the office today  He has no history of hypertension or diabetes      history of nephrolithiasis  No recent episode of stone  He denies any hematuria or flank pain  Continue to drink plenty of fluid  Continue to monitor  Diagnoses and all orders for this visit:    Stage 3a chronic kidney disease ([facility] [address] )  -     Basic metabolic panel; Future  -     CBC; Future  -     Microalbumin / creatinine urine ratio; Future  -     Phosphorus; Future  -     PTH, intact; Future    Vitamin D insufficiency  -     Phosphorus; Future  -     PTH, intact;  Future    Renal cyst    Nephrolithiasis  - Phosphorus; Future  -     PTH, intact; Future          SUBJECTIVE / HPI:  Patient is 76-year-old male with past medical history of BPH, history of kidney stones total 2 episodes, first episode in 1989 and second episode after couple years requiring lithotripsy, no history of hypertension or diabetes, CKD stage III with baseline creatinine 1 3-1 4, previous creatinine 1 1 in January 2016, comes for regular follow-up of his renal failure  He was recently hospitalized in August 2022 when he was found to have AFib with RVR  Also creatinine slightly elevated 1 7 improved with IV hydration  Most recent creatinine 1 5 closer to baseline  He is leaving to [place] in January for vacation  No recent NSAID exposure  He tries to keep himself hydrated  No chest pain, no shortness of breath, denies any nausea, vomiting or diarrhea  Overall he seems to be feeling well  He still remains very active  REVIEW OF SYSTEMS:  More than 10 point review of systems were obtained and discussed in length with the patient  Complete review of systems were negative / unremarkable except mentioned above  PHYSICAL EXAM:  Vitals:    11/14/22 1501   BP: 120/70   BP Location: Left arm   Patient Position: Sitting   Cuff Size: Adult   Weight: 66 2 kg (146 lb)   Height: 5' 9" (1 753 m)     Body mass index is 21 56 kg/m²  Physical Exam  Vitals reviewed  Constitutional:       Appearance: He is well-developed  HENT:      Head: Normocephalic and atraumatic  Right Ear: External ear normal       Left Ear: External ear normal    Eyes:      General: No scleral icterus  Conjunctiva/sclera: Conjunctivae normal    Cardiovascular:      Comments: S1, S2 present  Pulmonary:      Effort: Pulmonary effort is normal  No respiratory distress  Breath sounds: Normal breath sounds  No wheezing or rales  Abdominal:      General: Bowel sounds are normal  There is no distension  Palpations: Abdomen is soft        Tenderness: There is no abdominal tenderness  Musculoskeletal:         General: No deformity  Lymphadenopathy:      Cervical: No cervical adenopathy  Skin:     Findings: No rash  Neurological:      Mental Status: He is alert and oriented to person, place, and time     Psychiatric:         Behavior: Behavior normal          PAST MEDICAL HISTORY:  Past Medical History:   Diagnosis Date   • A-fib ([facility] ) 08/28/2022   • Shin's esophagus    • BPH (benign prostatic hyperplasia)    • Colon polyp    • GERD (gastroesophageal reflux disease)    • Herpes    • Hyperlipidemia    • Irregular heart beat    • Kidney stone        PAST SURGICAL HISTORY:  Past Surgical History:   Procedure Laterality Date   • CATARACT EXTRACTION     • COLONOSCOPY     • EGD AND COLONOSCOPY     • LITHOTRIPSY         SOCIAL HISTORY:  Social History     Substance and Sexual Activity   Alcohol Use Yes   • Alcohol/week: 1 0 - 2 0 standard drink   • Types: 1 - 2 Glasses of wine per week    Comment: occas     Social History     Substance and Sexual Activity   Drug Use No     Social History     Tobacco Use   Smoking Status Never Smoker   Smokeless Tobacco Never Used       FAMILY HISTORY:  Family History   Problem Relation Age of Onset   • No Known Problems Mother    • No Known Problems Father        MEDICATIONS:    Current Outpatient Medications:   •  cholecalciferol (VITAMIN D3) 1,000 units tablet, Take 1,000 Units by mouth daily, Disp: , Rfl:   •  famotidine (PEPCID) 40 MG tablet, Take 1 tablet by mouth once daily, Disp: 90 tablet, Rfl: 0  •  LORazepam (Ativan) 0 5 mg tablet, Take 1 tablet (0 5 mg total) by mouth daily as needed for anxiety, Disp: 5 tablet, Rfl: 0  •  metoprolol succinate (TOPROL-XL) 25 mg 24 hr tablet, Take 1 tablet (25 mg total) by mouth daily, Disp: 90 tablet, Rfl: 1  •  pravastatin (PRAVACHOL) 10 mg tablet, Take 1 tablet (10 mg total) by mouth daily, Disp: 90 tablet, Rfl: 3  •  rivaroxaban (Xarelto) 20 mg tablet, Take 1 tablet (20 mg total) by mouth daily with breakfast, Disp: 90 tablet, Rfl: 1  •  tamsulosin (FLOMAX) 0 4 mg, Take 1 capsule (0 4 mg total) by mouth daily with dinner, Disp: 90 capsule, Rfl: 1  •  vitamin B-12 (VITAMIN B-12) 1,000 mcg tablet, Take 1,000 mcg by mouth daily, Disp: , Rfl:     Lab Results:   Results for orders placed or performed in visit on 96/69/83   Basic metabolic panel   Result Value Ref Range    Sodium 139 135 - 147 mmol/L    Potassium 4 4 3 5 - 5 3 mmol/L    Chloride 108 96 - 108 mmol/L    CO2 26 21 - 32 mmol/L    ANION GAP 5 4 - 13 mmol/L    BUN 24 5 - 25 mg/dL    Creatinine 1 50 (H) 0 60 - 1 30 mg/dL    Glucose, Fasting 98 65 - 99 mg/dL    Calcium 9 4 8 3 - 10 1 mg/dL    eGFR 44 ml/min/1 73sq m   Phosphorus   Result Value Ref Range    Phosphorus 2 8 2 3 - 4 1 mg/dL   PTH, intact   Result Value Ref Range    PTH 53 0 18 4 - 80 1 pg/mL   Microalbumin / creatinine urine ratio   Result Value Ref Range    Creatinine, Ur 130 0 mg/dL    Microalbum  ,U,Random 6 4 0 0 - 20 0 mg/L    Microalb Creat Ratio 5 0 - 30 mg/g creatinine   Magnesium   Result Value Ref Range    Magnesium 2 2 1 6 - 2 6 mg/dL   CBC and differential   Result Value Ref Range    WBC 6 94 4 31 - 10 16 Thousand/uL    RBC 4 71 3 88 - 5 62 Million/uL    Hemoglobin 14 3 12 0 - 17 0 g/dL    Hematocrit 43 3 36 5 - 49 3 %    MCV 92 82 - 98 fL    MCH 30 4 26 8 - 34 3 pg    MCHC 33 0 31 4 - 37 4 g/dL    RDW 12 8 11 6 - 15 1 %    MPV 10 8 8 9 - 12 7 fL    Platelets 613 673 - 755 Thousands/uL    nRBC 0 /100 WBCs    Neutrophils Relative 61 43 - 75 %    Immat GRANS % 0 0 - 2 %    Lymphocytes Relative 25 14 - 44 %    Monocytes Relative 11 4 - 12 %    Eosinophils Relative 2 0 - 6 %    Basophils Relative 1 0 - 1 %    Neutrophils Absolute 4 18 1 85 - 7 62 Thousands/µL    Immature Grans Absolute 0 03 0 00 - 0 20 Thousand/uL    Lymphocytes Absolute 1 76 0 60 - 4 47 Thousands/µL    Monocytes Absolute 0 79 0 17 - 1 22 Thousand/µL    Eosinophils Absolute 0 14 0 00 - 0 61 Thousand/µL Basophils Absolute 0 04 0 00 - 0 10 Thousands/µL   Vitamin D 25 hydroxy   Result Value Ref Range    Vit D, 25-Hydroxy 52 2 30 0 - 100 0 ng/mL

## 2022-12-15 ENCOUNTER — OFFICE VISIT (OUTPATIENT)
Dept: CARDIOLOGY CLINIC | Facility: CLINIC | Age: 77
End: 2022-12-15

## 2022-12-15 VITALS
OXYGEN SATURATION: 100 % | DIASTOLIC BLOOD PRESSURE: 70 MMHG | BODY MASS INDEX: 21.62 KG/M2 | HEIGHT: 69 IN | TEMPERATURE: 97 F | SYSTOLIC BLOOD PRESSURE: 130 MMHG | WEIGHT: 146 LBS | HEART RATE: 56 BPM

## 2022-12-15 DIAGNOSIS — R00.1 BRADYCARDIA: ICD-10-CM

## 2022-12-15 DIAGNOSIS — N18.30 STAGE 3 CHRONIC KIDNEY DISEASE, UNSPECIFIED WHETHER STAGE 3A OR 3B CKD (HCC): ICD-10-CM

## 2022-12-15 DIAGNOSIS — Z86.59 HISTORY OF ANXIETY: ICD-10-CM

## 2022-12-15 DIAGNOSIS — R94.31 ABNORMAL EKG: ICD-10-CM

## 2022-12-15 DIAGNOSIS — E78.5 DYSLIPIDEMIA: ICD-10-CM

## 2022-12-15 DIAGNOSIS — I48.0 PAROXYSMAL ATRIAL FIBRILLATION (HCC): ICD-10-CM

## 2022-12-15 NOTE — PROGRESS NOTES
Personal Note - Cardiology Office   [facility]  Monalisa Osborne Reger 65 y o  male MRN: [id number]  : 1945  Unit/Bed#:  Encounter: [id number]      Assessment:     1  Paroxysmal atrial fibrillation (HCC)    2  Dyslipidemia    3  Stage 3 chronic kidney disease, unspecified whether stage 3a or 3b CKD ([address] )    4  Bradycardia    5  Abnormal EKG    6  History of anxiety        Discussion summary and Plan:    1  Paroxysmal atrial fibrillation  Patient was recently admitted to hospital with atrial fibrillation rapid ventricular rate  He spontaneously converted back to sinus rhythm current heart rate 58 beats per minute he is on very low-dose metoprolol and Xarelto  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 but for now he is maintaining sinus rhythm with metoprolol will continue Toprol XL 25 mg daily  No further issues continue same Rx heart rate is 56 bpm today  2   History of anxiety  Currently doing well  3  Dyslipidemia  Continue statins he is on pravastatin 10 mg cluster profile has improved labs done from 2020 reviewed  4  Cardiac murmur  Echo Doppler from  reviewed mild valvular disease  Discussed with patient  No new changes  Clinical heart murmurs no change    5  Abnormal EKG with ST flattening in inferior leads with exercise stress test shows no significant EKG abnormality  Nuclear stress test reviewed  No symptoms of angina    6  History of anemia with CKD stage 2/3  Patient was admitted at that time his creatinine was elevated he is encouraged to keep himself hydrated  Repeat labs shows creatinine 1 5 encouraged him to drink more fluid    Continue current Rx  His EKGs, nuclear stress test from August and echo test reviewed  No more episodes continue current Rx  Samples of Xarelto provided  Issues related to blood thinner Xarelto discussed understand risk involved  Thank you for your consultation    If you have any question please call me at [phone]    Counseling :  A description of the counseling  Goals and Barriers  Patient's ability to self care: Yes  Medication side effect reviewed with patient in detail and all their questions answered to their satisfaction  Primary Care Physician: Nedra Maldonado MD      HPI :     Cydney Uriostegui is a 65y o  year old male who was referred by primary care doctor for irregular heart beat and racing of high blood pressure  Patient who monitors his blood pressure and heart rate regularly noted that his blood pressure which generally runs around 100-110 was higher and his monitor was also giving irregular heartbeat  He has history of irregular heartbeat and he felt occasionally palpitations and fast heartbeat  He was started on low-dose metoprolol which have significantly decrease his palpitations as well as irregular heartbeat on his monitor and his blood pressure has also improved  He was sent to us for further cardiac workup  He denies any new symptoms no chest pain no shortness of breath no dizziness no lightheadedness no other issues  He does have history of cardiac murmur and has an echo done and found to have mild valvular disease  There is a family history of cardiac problem as his father  suddenly at age of 80  He does not smoke he is a social drinker is very active he does lot of hiking  He has no recent surgery  He lives alone and he has 2 kids who helped him  His cholesterol profile reviewed  His risk for cardiovascular event is around 20% mostly driven by his age  He does have history of anxiety particularly the current situation of the currently bothersome lot  2022  Above reviewed  Patient came for follow-up he was recently admitted to [facility] in 2022 with complaints of palpitation and was found to be in AFib with rapid ventricular rate  He spontaneously went back to sinus rhythm and was added on low-dose metoprolol and Xarelto    Today his heart rate is 58 beats per minute  He has nuclear stress test at that time shows no ischemia EF was 52%  He has mildly elevated troponin  He is tolerating these medications well  He still occasionally get palpitations but they are not as problem he is compliant with his medications  No nausea no vomiting no other cardiovascular issues at this time  He brought his blood pressure diary most of the readings are between  and heart rate has been acceptable  He is encouraged to drink more fluid when he was admitted he was dehydrated  12/15/2022  Above reviewed  Patient came for follow-up  He has medical history significant for proximal estimated fibrillation where he was admitted to [address] [place] [address] August 2022 with atrial fibrillation and spontaneously converted back to sinus rhythm  His other medical history significant for palpitation with previous monitoring shows few PVCs, dyslipidemia, abnormal EKG who came for follow-up  He has blood test done in October 2022 which shows his creatinine is now 1 50 and other electrolytes were acceptable  His creatinine has been up and down  His current medication reviewed he is on Xarelto metoprolol XL, pravastatin Flomax and lorazepam   He is encouraged to drink more fluid because when he was admitted in August he was found to be dehydrated  He is feeling well no new complaints no nausea no vomiting no PND no orthopnea no other cardiovascular issues  Review of Systems   Constitutional: Negative for activity change, chills, diaphoresis, fever and unexpected weight change  HENT: Negative for congestion  Eyes: Negative for discharge and redness  Respiratory: Negative for cough, chest tightness, shortness of breath and wheezing  Cardiovascular: Negative  Negative for chest pain, palpitations and leg swelling  Gastrointestinal: Negative for abdominal pain, diarrhea and nausea  Endocrine: Negative      Genitourinary: Negative for decreased urine volume and urgency  Musculoskeletal: Negative  Negative for arthralgias, back pain and gait problem  Skin: Negative for rash and wound  Allergic/Immunologic: Negative  Neurological: Negative for dizziness, seizures, syncope, weakness, light-headedness and headaches  Hematological: Negative  Psychiatric/Behavioral: Negative for agitation and confusion  The patient is nervous/anxious          Historical Information   Past Medical History:   Diagnosis Date   • A-fib ([facility] [address] ) 08/28/2022   • Shin's esophagus    • BPH (benign prostatic hyperplasia)    • Colon polyp    • GERD (gastroesophageal reflux disease)    • Herpes    • Hyperlipidemia    • Irregular heart beat    • Kidney stone      Past Surgical History:   Procedure Laterality Date   • CATARACT EXTRACTION     • COLONOSCOPY     • EGD AND COLONOSCOPY     • LITHOTRIPSY       Social History     Substance and Sexual Activity   Alcohol Use Yes   • Alcohol/week: 1 0 - 2 0 standard drink   • Types: 1 - 2 Glasses of wine per week    Comment: occas     Social History     Substance and Sexual Activity   Drug Use No     Social History     Tobacco Use   Smoking Status Never   Smokeless Tobacco Never     Family History:   Family History   Problem Relation Age of Onset   • No Known Problems Mother    • No Known Problems Father        Meds/Allergies     No Known Allergies    Current Outpatient Medications:   •  cholecalciferol (VITAMIN D3) 1,000 units tablet, Take 1,000 Units by mouth daily, Disp: , Rfl:   •  famotidine (PEPCID) 40 MG tablet, Take 1 tablet by mouth once daily, Disp: 90 tablet, Rfl: 0  •  LORazepam (Ativan) 0 5 mg tablet, Take 1 tablet (0 5 mg total) by mouth daily as needed for anxiety, Disp: 5 tablet, Rfl: 0  •  metoprolol succinate (TOPROL-XL) 25 mg 24 hr tablet, Take 1 tablet (25 mg total) by mouth daily, Disp: 90 tablet, Rfl: 1  •  pravastatin (PRAVACHOL) 10 mg tablet, Take 1 tablet (10 mg total) by mouth daily, Disp: 90 tablet, Rfl: 3  •  rivaroxaban (Xarelto) 20 mg tablet, Take 1 tablet (20 mg total) by mouth daily with breakfast, Disp: 90 tablet, Rfl: 1  •  tamsulosin (FLOMAX) 0 4 mg, Take 1 capsule (0 4 mg total) by mouth daily with dinner, Disp: 90 capsule, Rfl: 1  •  vitamin B-12 (VITAMIN B-12) 1,000 mcg tablet, Take 1,000 mcg by mouth daily, Disp: , Rfl:     Vitals: Blood pressure 130/70, pulse 56, temperature (!) 97 °F (36 1 °C), height 5' 9" (1 753 m), weight 66 2 kg (146 lb), SpO2 100 %  ?  Body mass index is 21 56 kg/m²  Vitals:    12/15/22 0939   Weight: 66 2 kg (146 lb)     BP Readings from Last 3 Encounters:   12/15/22 130/70   11/14/22 120/70   10/24/22 126/80         Physical Exam  Constitutional:       General: He is not in acute distress  Appearance: He is well-developed  He is not diaphoretic  Neck:      Thyroid: No thyromegaly  Vascular: No JVD  Trachea: No tracheal deviation  Cardiovascular:      Rate and Rhythm: Normal rate and regular rhythm  Heart sounds: S1 normal and S2 normal  Heart sounds not distant  Murmur heard  Systolic (ejection) murmur is present with a grade of 2/6  No friction rub  No gallop  No S3 or S4 sounds  Pulmonary:      Effort: Pulmonary effort is normal  No respiratory distress  Breath sounds: Normal breath sounds  No wheezing or rales  Chest:      Chest wall: No tenderness  Abdominal:      General: Bowel sounds are normal  There is no distension  Palpations: Abdomen is soft  Tenderness: There is no abdominal tenderness  Musculoskeletal:         General: No deformity  Cervical back: Neck supple  Skin:     General: Skin is warm and dry  Coloration: Skin is not pale  Findings: No rash  Neurological:      Mental Status: He is alert and oriented to person, place, and time     Psychiatric:         Behavior: Behavior normal          Judgment: Judgment normal          Diagnostic Studies Review Cardio:  Echo reviewed    Two thousand nineteen shows EF 60%, left atrium moderately dilated, no significant valvular disease other than trace MR trace AI and trace TR  Exercise stress test   Exercise stress test done 2020 shows normal exercise stress test   He exercised for about 6 minutes  Nuclear stress test 2022 was normal with EF around 52%        Holter monitor  Holter monitor shows dominant rhythm is sinus rhythm few PACs and PVCs there were less than 2% of the total beats  Short atrial runs were noted there were nonsustained  EKG:  Normal sinus rhythm heart rate 67 beats per minute  Minimal voltage for LVH T-wave abnormality in inferior leads  Twelve lead EKG done on 2022 shows normal sinus rhythm with sinus arrhythmia heart rate 61 beats per minute LVH by voltage  T-wave inversion noted in inferior leads  No change from previous EKG    Lead EKG 2022 shows normal sinus rhythm heart rate 58 beats per minute LVH by voltage T-wave inversion inferior leads  Twelve-lead EKG done on 12/15/2022 shows normal sinus rhythm with a heart rate 56 beats minute LVH by voltage  Given what she noted inferior leads which is no change from previous EKG  Cardiac testing:   Results for orders placed during the hospital encounter of 19   Echo complete with contrast if indicated    Narrative [address]  [address] [facility] 6 [phone]    Transthoracic Echocardiogram  2D, M-mode, Doppler, and Color Doppler    Study date:  24-May-2019    Patient: Milo Cheney  MR number: [id number]  Account number: [de-identified]  : 1945  Age: 75 years  Gender: Male  Status: Outpatient  Location: Echo lab  Height: 70 in  Weight: 149 6 lb  BP: 110/ 70 mmHg    Indications: CVA    Diagnoses: I67 9 - Cerebrovascular disease, unspecified    Sonographer:  JELANI Esquivel  Primary Physician: Ghislaine Rock MD  Referring Physician:   Ghislaine Rock MD  Group:  [facility]  Interpreting Physician:  Efren Mas MD    SUMMARY    LEFT VENTRICLE:  Systolic function was normal  Ejection fraction was estimated in the range of 55 % to 60 % to be 60 %  There were no regional wall motion abnormalities  Doppler parameters were consistent with abnormal left ventricular relaxation (grade 1 diastolic dysfunction)  LEFT ATRIUM:  The atrium was moderately dilated  RIGHT ATRIUM:  The atrium was mildly dilated  MITRAL VALVE:  There was trace regurgitation  AORTIC VALVE:  There was trace regurgitation  TRICUSPID VALVE:  There was trace regurgitation  The tricuspid jet envelope definition was inadequate for estimation of RV systolic pressure  There are no indirect findings (abnormal RV volume or geometry, altered pulmonary flow velocity profile, or leftward septal displacement) which  would suggest moderate or severe pulmonary hypertension  HISTORY: PRIOR HISTORY: CKD, BPH, GERD, Herpes    PROCEDURE: The procedure was performed in the echo lab  This was a routine study  The transthoracic approach was used  The study included complete 2D imaging, M-mode, complete spectral Doppler, and color Doppler  The heart rate was 68 bpm,  at the start of the study  Image quality was adequate  LEFT VENTRICLE: Size was normal  Systolic function was normal  Ejection fraction was estimated in the range of 55 % to 60 % to be 60 %  There were no regional wall motion abnormalities  Wall thickness was normal  No evidence of apical  thrombus  DOPPLER: Doppler parameters were consistent with abnormal left ventricular relaxation (grade 1 diastolic dysfunction)  RIGHT VENTRICLE: The size was normal  Systolic function was normal  Wall thickness was normal     LEFT ATRIUM: The atrium was moderately dilated  RIGHT ATRIUM: The atrium was mildly dilated  MITRAL VALVE: There was mild thickening  There was normal leaflet separation   DOPPLER: The transmitral velocity was within the normal range  There was no evidence for stenosis  There was trace regurgitation  AORTIC VALVE: The valve was trileaflet  Leaflets exhibited mildly increased thickness, mild calcification, and normal cuspal separation  DOPPLER: Transaortic velocity was within the normal range  There was no evidence for stenosis  There  was trace regurgitation  TRICUSPID VALVE: The valve structure was normal  There was normal leaflet separation  DOPPLER: The transtricuspid velocity was within the normal range  There was no evidence for stenosis  There was trace regurgitation  The tricuspid jet  envelope definition was inadequate for estimation of RV systolic pressure  There are no indirect findings (abnormal RV volume or geometry, altered pulmonary flow velocity profile, or leftward septal displacement) which would suggest  moderate or severe pulmonary hypertension  PULMONIC VALVE: Leaflets exhibited normal thickness, no calcification, and normal cuspal separation  DOPPLER: The transpulmonic velocity was within the normal range  There was no significant regurgitation  PERICARDIUM: There was no pericardial effusion  The pericardium was normal in appearance  AORTA: The root exhibited normal size  SYSTEMIC VEINS: IVC: The inferior vena cava was normal in size  SYSTEM MEASUREMENT TABLES    2D mode  AoR Diam 2D: 3 6 cm  LA Diam (2D): 4 5 cm  LA/Ao (2D): 1 25  FS (2D Teich): 28 2 %  IVSd (2D): 0 96 cm  LVDEV: 94 4 cmï¾³  LVESV: 42 8 cmï¾³  LVIDd(2D): 4 54 cm  LVISd (2D): 3 26 cm  LVPWd (2D): 1 cm  SV (Teich): 51 6 cmï¾³    Apical four chamber  LVEF A4C: 62 %    Unspecified Scan Mode  MV Peak A Jason: 843 mm/s  MV Peak E Jason   Mean: 747 mm/s  MVA (PHT): 3 38 cmï¾²  PHT: 65 ms  Max P mm[Hg]  V Max: 2170 mm/s  Vmax: 2370 mm/s  RA Area: 18 cmï¾²  RA Volume: 51 7 cmï¾³  TAPSE: 2 2 cm    [employer] Accredited Echocardiography Laboratory    Prepared and electronically signed by    Jim Cervantes MD  Signed [facility]-[id number] 16:36:08         Imaging:  Chest X-Ray:   No Chest XR results available for this patient  CT-scan of the chest:     No CTA results available for this patient  Lab Review   Lab Results   Component Value Date    WBC 6 94 10/24/2022    HGB 14 3 10/24/2022    HCT 43 3 10/24/2022    MCV 92 10/24/2022    RDW 12 8 10/24/2022     10/24/2022     BMP:  Lab Results   Component Value Date    SODIUM 139 10/24/2022    K 4 4 10/24/2022     10/24/2022    CO2 26 10/24/2022    BUN 24 10/24/2022    CREATININE 1 50 (H) 10/24/2022    GLUC 97 08/30/2022    GLUF 98 10/24/2022    CALCIUM 9 4 10/24/2022    EGFR 44 10/24/2022    MG 2 2 10/24/2022     LFT:  Lab Results   Component Value Date    AST 39 08/29/2022    ALT 34 08/29/2022    ALKPHOS 81 08/29/2022    TP 7 5 08/29/2022    ALB 4 0 08/29/2022      Lab Results   Component Value Date    CNL2YNSJHGRX 3 728 08/29/2022     Lab Results   Component Value Date    HGBA1C 6 0 06/16/2016     Lipid Profile:   Lab Results   Component Value Date    CHOLESTEROL 190 01/13/2022    HDL 50 01/13/2022    LDLCALC 115 (H) 01/13/2022    TRIG 126 01/13/2022     Lab Results   Component Value Date    CHOLESTEROL 190 01/13/2022    CHOLESTEROL 167 06/14/2021     Lab Results   Component Value Date    CKTOTAL 130 04/29/2019    CKMB 2 7 06/23/2016    CKMBINDEX 1 3 06/23/2016           Dr Jason Chaidez MD [facility]      "This note has been constructed using a voice recognition system  Therefore there may be syntax, spelling, and/or grammatical errors   Please call if you have any questions  "

## 2023-01-06 ENCOUNTER — APPOINTMENT (OUTPATIENT)
Dept: LAB | Facility: CLINIC | Age: 78
End: 2023-01-06

## 2023-01-06 DIAGNOSIS — N18.31 STAGE 3A CHRONIC KIDNEY DISEASE (HCC): ICD-10-CM

## 2023-01-06 DIAGNOSIS — E78.00 HYPERCHOLESTEREMIA: ICD-10-CM

## 2023-01-06 LAB
ALBUMIN SERPL BCP-MCNC: 3.8 G/DL (ref 3.5–5)
ALP SERPL-CCNC: 71 U/L (ref 46–116)
ALT SERPL W P-5'-P-CCNC: 25 U/L (ref 12–78)
ANION GAP SERPL CALCULATED.3IONS-SCNC: 7 MMOL/L (ref 4–13)
AST SERPL W P-5'-P-CCNC: 25 U/L (ref 5–45)
BILIRUB SERPL-MCNC: 0.67 MG/DL (ref 0.2–1)
BUN SERPL-MCNC: 23 MG/DL (ref 5–25)
CALCIUM SERPL-MCNC: 9.1 MG/DL (ref 8.3–10.1)
CHLORIDE SERPL-SCNC: 110 MMOL/L (ref 96–108)
CHOLEST SERPL-MCNC: 154 MG/DL
CO2 SERPL-SCNC: 24 MMOL/L (ref 21–32)
CREAT SERPL-MCNC: 1.31 MG/DL (ref 0.6–1.3)
GFR SERPL CREATININE-BSD FRML MDRD: 51 ML/MIN/1.73SQ M
GLUCOSE P FAST SERPL-MCNC: 95 MG/DL (ref 65–99)
HDLC SERPL-MCNC: 57 MG/DL
LDLC SERPL CALC-MCNC: 82 MG/DL (ref 0–100)
NONHDLC SERPL-MCNC: 97 MG/DL
POTASSIUM SERPL-SCNC: 4.7 MMOL/L (ref 3.5–5.3)
PROT SERPL-MCNC: 7.1 G/DL (ref 6.4–8.4)
SODIUM SERPL-SCNC: 141 MMOL/L (ref 135–147)
TRIGL SERPL-MCNC: 75 MG/DL

## 2023-01-27 ENCOUNTER — RA CDI HCC (OUTPATIENT)
Dept: OTHER | Facility: HOSPITAL | Age: 78
End: 2023-01-27

## 2023-01-27 NOTE — PROGRESS NOTES
[address]  coding opportunities       Chart reviewed, no opportunity found: CHART REVIEWED, NO OPPORTUNITY FOUND        Patients Insurance     Medicare Insurance: Medicare

## 2023-02-02 DIAGNOSIS — E78.5 DYSLIPIDEMIA: ICD-10-CM

## 2023-02-02 RX ORDER — PRAVASTATIN SODIUM 10 MG
10 TABLET ORAL DAILY
Qty: 90 TABLET | Refills: 3 | Status: SHIPPED | OUTPATIENT
Start: 2023-02-02

## 2023-02-06 ENCOUNTER — OFFICE VISIT (OUTPATIENT)
Dept: INTERNAL MEDICINE CLINIC | Facility: CLINIC | Age: 78
End: 2023-02-06

## 2023-02-06 VITALS
WEIGHT: 142.4 LBS | OXYGEN SATURATION: 100 % | HEIGHT: 68 IN | BODY MASS INDEX: 21.58 KG/M2 | HEART RATE: 82 BPM | DIASTOLIC BLOOD PRESSURE: 70 MMHG | SYSTOLIC BLOOD PRESSURE: 104 MMHG

## 2023-02-06 DIAGNOSIS — E78.00 HYPERCHOLESTEREMIA: ICD-10-CM

## 2023-02-06 DIAGNOSIS — N18.31 STAGE 3A CHRONIC KIDNEY DISEASE (HCC): Primary | ICD-10-CM

## 2023-02-06 DIAGNOSIS — N40.0 BENIGN PROSTATIC HYPERPLASIA, UNSPECIFIED WHETHER LOWER URINARY TRACT SYMPTOMS PRESENT: ICD-10-CM

## 2023-02-06 DIAGNOSIS — D69.6 PLATELETS DECREASED (HCC): ICD-10-CM

## 2023-02-06 DIAGNOSIS — I48.0 PAROXYSMAL ATRIAL FIBRILLATION (HCC): ICD-10-CM

## 2023-02-06 DIAGNOSIS — K22.70 BARRETT'S ESOPHAGUS WITHOUT DYSPLASIA: ICD-10-CM

## 2023-02-06 DIAGNOSIS — N20.0 NEPHROLITHIASIS: ICD-10-CM

## 2023-02-06 DIAGNOSIS — N18.30 STAGE 3 CHRONIC KIDNEY DISEASE, UNSPECIFIED WHETHER STAGE 3A OR 3B CKD (HCC): ICD-10-CM

## 2023-02-06 DIAGNOSIS — F41.9 ANXIETY: ICD-10-CM

## 2023-02-06 DIAGNOSIS — N52.8 OTHER MALE ERECTILE DYSFUNCTION: ICD-10-CM

## 2023-02-06 DIAGNOSIS — E55.9 VITAMIN D INSUFFICIENCY: ICD-10-CM

## 2023-02-06 DIAGNOSIS — R63.4 WEIGHT LOSS: ICD-10-CM

## 2023-02-06 DIAGNOSIS — R12 HEART BURN: ICD-10-CM

## 2023-02-06 PROBLEM — I49.9 IRREGULAR HEART BEAT: Status: RESOLVED | Noted: 2020-08-21 | Resolved: 2023-02-06

## 2023-02-06 RX ORDER — FAMOTIDINE 40 MG/1
40 TABLET, FILM COATED ORAL DAILY
Qty: 90 TABLET | Refills: 0 | Status: SHIPPED | OUTPATIENT
Start: 2023-02-06

## 2023-02-06 RX ORDER — TADALAFIL 20 MG/1
20 TABLET ORAL DAILY PRN
Qty: 10 TABLET | Refills: 2 | Status: SHIPPED | OUTPATIENT
Start: 2023-02-06

## 2023-02-06 RX ORDER — TAMSULOSIN HYDROCHLORIDE 0.4 MG/1
0.4 CAPSULE ORAL
Qty: 90 CAPSULE | Refills: 1 | Status: SHIPPED | OUTPATIENT
Start: 2023-02-06

## 2023-02-06 NOTE — ASSESSMENT & PLAN NOTE
Lab Results   Component Value Date    EGFR 51 01/06/2023    EGFR 44 10/24/2022    EGFR 59 08/30/2022    CREATININE 1 31 (H) 01/06/2023    CREATININE 1 50 (H) 10/24/2022    CREATININE 1 18 08/30/2022   GFR is baseline  Creat is baseline  Electrolytes stable  Recommend periodic blood test for monitoring of  Renal Function, lytes, GFR and urine for MA/Creat  Avoid Non Steroidal Antiinflammatory such as ibuprofen, aleve etc     Bone and Mineral disease monitoring as appropriate  All patient with GFR less than 30( CKD 4 or 5 or 6) are advised to follow with nephrologist     Baseline creatinine 1 3-1 6  Vitamin D normal   PTH to be done  Seen by nephrologist   Note noted

## 2023-02-06 NOTE — ASSESSMENT & PLAN NOTE
Continue famotidine  Encourage to follow anti acid diet  Advise to get periodic Upper GI Endoscopy per your gastroenterologist-last egd 4-  Status: sx free  See MD for any recurrent heartburn, difficulty swallowing, weight loss or any GI Bleeding

## 2023-02-06 NOTE — PATIENT INSTRUCTIONS
Recommend 3 healthy meals a day as well as protein supplement or nutritional supplement in between and or at bedtime  Daily or weekly weight  If weight loss continues and less than 138 please call me anytime  Blood pressure  If blood pressure is less than 100 do not take Cialis  Hypertension( High Blood Pressure ):    Check your blood pressure at home periodically, keep the diary and bring records at your chronic disease management/Hypertension visit    Continue your Blood Pressure Medicine as per your current Medication List/ as advised    Avoid excessive salt    Know your BP target range ( Usually Systolic 536-763 and diastolic less than 85- In some cases it may be different )    Follow with Consultants as per their and our suggestion    Follow up in 12 week(s) or as needed earlier    Follow all instructions as advised and discussed  Take your medications as prescribed  Call the office immediately if you experience any side effects  Ask questions if you do not understand  Keep your scheduled appointment as advised or come sooner if necessary or in doubt  Best time to call for non-urgent matter or questions on weekdays is between 9am and 12 noon  See physician for any new symptoms or worsening of current symptoms  Urgent or emergent situations call 911 and report to nearest emergency room      I spent  30 -40 minutes taking care of this patient including clinical care, conseling, collaboration, chart, lab and consultaion review as appropriate    Patient is to get labs 1 week(s) prior to next visit if advised

## 2023-02-06 NOTE — ASSESSMENT & PLAN NOTE
Exactly has been very good and he has not used lorazepam anyway      We will discontinue from the list

## 2023-02-06 NOTE — ASSESSMENT & PLAN NOTE
Pulse regular  Seen by cardiologist in last 3 months the notes were noted  Remains on Xarelto as well as metoprolol succinate 25 mg daily pulse regular  Symptom-free from cardiac standpoint    Up-to-date with a cardiac stress test 8/30/2022

## 2023-02-06 NOTE — ASSESSMENT & PLAN NOTE
His weight currently is 142 last 2 weeks 146 pounds  BMI 21 65  He feels good offers no complaint  Recommend 3 healthy meals a day as well as protein supplement or nutritional supplement in between and or at bedtime  Daily or weekly weight  If weight loss continues and less than 138 please call me anytime

## 2023-02-06 NOTE — ASSESSMENT & PLAN NOTE
Lab Results   Component Value Date    LDLCALC 82 01/06/2023     Lab Results   Component Value Date    ALT 25 01/06/2023     Lab Results   Component Value Date    CHOLESTEROL 154 01/06/2023    CHOLESTEROL 190 01/13/2022    CHOLESTEROL 167 06/14/2021     Lab Results   Component Value Date    HDL 57 01/06/2023    HDL 50 01/13/2022    HDL 56 06/14/2021     Lab Results   Component Value Date    TRIG 75 01/06/2023    TRIG 126 01/13/2022    TRIG 56 06/14/2021     Lab Results   Component Value Date    NONHDLC 97 01/06/2023    [place] 140 01/13/2022    [place] 111 06/14/2021     Continue low cholesterol diet and pravastatin 10 mg daily

## 2023-02-06 NOTE — PROGRESS NOTES
Name: Queta Murillo      : 1945      MRN: [id number]  Encounter Provider: Linda Sanders MD  Encounter Date: 2023   Encounter department: [address]     1  Stage 3a chronic kidney disease [facility])  Assessment & Plan:  Lab Results   Component Value Date    EGFR 51 2023    EGFR 44 10/24/2022    EGFR 59 2022    CREATININE 1 31 (H) 2023    CREATININE 1 50 (H) 10/24/2022    CREATININE 1 18 2022   GFR is baseline  Creat is baseline  Electrolytes stable  Recommend periodic blood test for monitoring of  Renal Function, lytes, GFR and urine for MA/Creat  Avoid Non Steroidal Antiinflammatory such as ibuprofen, aleve etc     Bone and Mineral disease monitoring as appropriate  All patient with GFR less than 30( CKD 4 or 5 or 6) are advised to follow with nephrologist     Baseline creatinine 1 3-1 6  Vitamin D normal   PTH to be done  Seen by nephrologist   Note noted  2  Heart burn  -     famotidine (PEPCID) 40 MG tablet; Take 1 tablet (40 mg total) by mouth daily    3  Benign prostatic hyperplasia, unspecified whether lower urinary tract symptoms present  -     tamsulosin (FLOMAX) 0 4 mg; Take 1 capsule (0 4 mg total) by mouth daily with dinner    4  Paroxysmal atrial fibrillation (HCC)  Assessment & Plan:  Pulse regular  Seen by cardiologist in last 3 months the notes were noted  Remains on Xarelto as well as metoprolol succinate 25 mg daily pulse regular  Symptom-free from cardiac standpoint  Up-to-date with a cardiac stress test 2022      5  Platelets decreased (HCC)    6  Stage 3 chronic kidney disease, unspecified whether stage 3a or 3b CKD ([facility] [place] [address] )    7  Other male erectile dysfunction  Assessment & Plan:  Cialis 20 mg daily as needed    He used this dose in the past and tolerated it well  Orders:  -     tadalafil (CIALIS) 20 MG tablet;  Take 1 tablet (20 mg total) by mouth daily as needed for erectile dysfunction    8  Vitamin D insufficiency    9  Weight loss  Assessment & Plan:  His weight currently is 142 last 2 weeks 146 pounds  BMI 21 65  He feels good offers no complaint  Recommend 3 healthy meals a day as well as protein supplement or nutritional supplement in between and or at bedtime  Daily or weekly weight  If weight loss continues and less than 138 please call me anytime  10  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  Assessment & Plan:  Continue famotidine  Encourage to follow anti acid diet  Advise to get periodic Upper GI Endoscopy per your gastroenterologist-last egd 4-  Status: sx free  See MD for any recurrent heartburn, difficulty swallowing, weight loss or any GI Bleeding         11  Hypercholesteremia  Assessment & Plan:  Lab Results   Component Value Date    LDLCALC 82 01/06/2023     Lab Results   Component Value Date    ALT 25 01/06/2023     Lab Results   Component Value Date    CHOLESTEROL 154 01/06/2023    CHOLESTEROL 190 01/13/2022    CHOLESTEROL 167 06/14/2021     Lab Results   Component Value Date    HDL 57 01/06/2023    HDL 50 01/13/2022    HDL 56 06/14/2021     Lab Results   Component Value Date    TRIG 75 01/06/2023    TRIG 126 01/13/2022    TRIG 56 06/14/2021     Lab Results   Component Value Date    NONHDLC 97 01/06/2023    [place] 140 01/13/2022    [place] 111 06/14/2021     Continue low cholesterol diet and pravastatin 10 mg daily      12  Anxiety  Assessment & Plan:  Exactly has been very good and he has not used lorazepam anyway  We will discontinue from the list      13  Nephrolithiasis  Assessment & Plan:  Remote history  Symptom-free  Renal ultrasound noted in terms of nephrolithiasis             Subjective     Patient is here for chronic disease management  Patient offers no specific complaint  Requesting Cialis for the ED  He had used 20 mg in the past   Recommend to try 10 mg interaction with the Flomax were reviewed    Do not take if blood pressure is less than 100  Atrial fibrillation pulse regular no cardiac symptoms remains on Xarelto and metoprolol succinate 25 mg daily  Thrombocytopenia symptom-free will monitor  Hyperlipidemia LDL to the target ALT normal tolerating prostate pain without side effect  CKD level 3 creatinine down to 1 3 range usual baseline 1 3-1 7 vitamin D normal PTH to be done seen by nephrologist  Shin's stable remains on famotidine up-to-date with the last endoscopy  Personal history of colonic polyps last colonoscopy done in April 2022 we will monitor  Anemia  Stable  Anxiety    Did not help to use lorazepam will discontinue from the list   BPH waiting for    Tolerating Flomax without side effect  A Cologuard 06/11/2021 Negative  Colonoscopy in 3/2022: 1  Single colon polyp removed  2  Extensive diverticulosis  1/6/2023: GFR 51 rest of the CMP normal creatinine 1 31 LDL 82,  10/24/2022: CBC normal, PTH 53, calcium 9 1, phosphorus 2 8,  08/29/2022:  CBC normal except platelet 547 is CMP normal except creatinine 1 72 GFR 37 magnesium 1 9 proBNP 694 D-dimer 0 74 TSH 3 72 troponin unremarkable  08/03/2022:  Stress test see full report  08/30/2022: A CMP normal except creatinine 1 18 GFR 59 blood sugar 97 platelet 583  61/12/8761:  GFR 46, creatinine 1 43, CBC normal platelet normal  80/81/3687:  LFT normal, cholesterol 190, ,  06/14/2021 cholesterol 167 triglyceride 58 , creatinine 1 32 BUN 22 GFR 52,              Review of Systems   Constitutional: Negative for appetite change, fatigue, fever and unexpected weight change  HENT: Negative for congestion, ear pain and sore throat  Eyes: Negative for pain and redness  Respiratory: Negative for cough and shortness of breath  Cardiovascular: Negative for chest pain, palpitations and leg swelling  Gastrointestinal: Negative for abdominal pain, diarrhea, nausea and vomiting     Endocrine: Negative for cold intolerance, polydipsia and polyuria  Genitourinary: Negative for dysuria, frequency and urgency  Musculoskeletal: Negative for arthralgias, gait problem and myalgias  Skin: Negative for rash  Allergic/Immunologic: Negative  Neurological: Negative for dizziness and headaches  Hematological: Negative for adenopathy  Psychiatric/Behavioral: Negative for behavioral problems  Past Medical History:   Diagnosis Date   • A-fib ([facility] ) 08/28/2022   • Shin's esophagus    • BPH (benign prostatic hyperplasia)    • Colon polyp    • GERD (gastroesophageal reflux disease)    • Herpes    • Hyperlipidemia    • Irregular heart beat    • Kidney stone      Past Surgical History:   Procedure Laterality Date   • CATARACT EXTRACTION     • COLONOSCOPY     • EGD AND COLONOSCOPY     • LITHOTRIPSY       Family History   Problem Relation Age of Onset   • No Known Problems Mother    • No Known Problems Father      Social History     Socioeconomic History   • Marital status:      Spouse name: None   • Number of children: None   • Years of education: None   • Highest education level: None   Occupational History   • None   Tobacco Use   • Smoking status: Never   • Smokeless tobacco: Never   Vaping Use   • Vaping Use: Never used   Substance and Sexual Activity   • Alcohol use:  Yes     Alcohol/week: 1 0 - 2 0 standard drink     Types: 1 - 2 Glasses of wine per week     Comment: occas   • Drug use: No   • Sexual activity: None   Other Topics Concern   • None   Social History Narrative   • None     Social Determinants of Health     Financial Resource Strain: Not on file   Food Insecurity: Not on file   Transportation Needs: Not on file   Physical Activity: Not on file   Stress: Not on file   Social Connections: Not on file   Intimate Partner Violence: Not on file   Housing Stability: Not on file     Current Outpatient Medications on File Prior to Visit   Medication Sig   • cholecalciferol (VITAMIN D3) 1,000 units tablet Take 1,000 Units by mouth daily   • metoprolol succinate (TOPROL-XL) 25 mg 24 hr tablet Take 1 tablet (25 mg total) by mouth daily   • pravastatin (PRAVACHOL) 10 mg tablet Take 1 tablet (10 mg total) by mouth daily   • rivaroxaban (Xarelto) 20 mg tablet Take 1 tablet (20 mg total) by mouth daily with breakfast   • vitamin B-12 (VITAMIN B-12) 1,000 mcg tablet Take 1,000 mcg by mouth daily   • [DISCONTINUED] famotidine (PEPCID) 40 MG tablet Take 1 tablet by mouth once daily   • [DISCONTINUED] LORazepam (Ativan) 0 5 mg tablet Take 1 tablet (0 5 mg total) by mouth daily as needed for anxiety   • [DISCONTINUED] tamsulosin (FLOMAX) 0 4 mg Take 1 capsule (0 4 mg total) by mouth daily with dinner   • [DISCONTINUED] apixaban (Eliquis) 5 mg Take 1 tablet (5 mg total) by mouth 2 (two) times a day     No Known Allergies  Immunization History   Administered Date(s) Administered   • COVID-19 MODERNA VACC 0 25 ML IM BOOSTER 12/17/2021   • COVID-19 MODERNA VACC 0 5 ML IM 02/05/2021, 03/05/2021   • Influenza Split High Dose Preservative Free IM 10/18/2019   • Influenza, high dose seasonal 0 7 mL 09/18/2020, 10/05/2021, 10/24/2022   • Tdap 05/27/2021       Objective     /70   Pulse 82   Ht 5' 8" (1 727 m)   Wt 64 6 kg (142 lb 6 4 oz)   SpO2 100%   BMI 21 65 kg/m²     Physical Exam  Constitutional:       General: He is not in acute distress  Appearance: He is well-developed  He is not ill-appearing or diaphoretic  HENT:      Head: Normocephalic and atraumatic  Right Ear: External ear normal       Left Ear: External ear normal    Eyes:      General: Lids are normal          Right eye: No discharge  Left eye: No discharge  Conjunctiva/sclera: Conjunctivae normal    Neck:      Thyroid: No thyroid mass or thyromegaly  Vascular: No carotid bruit or JVD  Trachea: Trachea normal    Cardiovascular:      Rate and Rhythm: Regular rhythm  Heart sounds: Normal heart sounds  No murmur heard    Pulmonary:      Effort: No respiratory distress  Breath sounds: No wheezing, rhonchi or rales  Abdominal:      Palpations: There is no mass  Tenderness: There is no abdominal tenderness  Musculoskeletal:      Right lower leg: No edema  Left lower leg: No edema  Lymphadenopathy:      Cervical: No cervical adenopathy  Skin:     General: Skin is warm  Coloration: Skin is not jaundiced or pale  Findings: No rash  Neurological:      General: No focal deficit present  Mental Status: He is alert and oriented to person, place, and time         Hanane Ceja MD

## 2023-03-23 DIAGNOSIS — I48.91 ATRIAL FIBRILLATION (HCC): ICD-10-CM

## 2023-03-23 NOTE — PROGRESS NOTES
[facility] Office Visit Note  22     Meera Cardenas 67 y o  male MRN: [id number]  : 1945    Assessment:     1  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  Assessment & Plan:  Last EGD was on 3/2022  1  Moderate size hiatal hernia noted  2  Barretts esophagus and reflux  3  Antral gastritis  Currently on famotidine  No complaints  Stable  2  Stage 3a chronic kidney disease [facility])  Assessment & Plan:  Lab Results   Component Value Date    EGFR 46 2022    EGFR 54 10/05/2021    EGFR 52 2021    CREATININE 1 43 (H) 2022    CREATININE 1 28 10/05/2021    CREATININE 1 32 (H) 2021     Stable  Baseline cr around 1 3 - 1 4  He follows Nephrology closely  Monitor BMP      3  Hypercholesteremia  Assessment & Plan:  Lab Results   Component Value Date    CHOLESTEROL 190 2022    CHOLESTEROL 167 2021    CHOLESTEROL 173 2020     Lab Results   Component Value Date    HDL 50 2022    HDL 56 2021    HDL 52 2020     Lab Results   Component Value Date    TRIG 126 2022    TRIG 56 2021    TRIG 87 2020     Lab Results   Component Value Date    NONHDLC 140 2022    [place] 111 2021    [place] 121 2020     On pravastatin        4  Platelets decreased ([facility] [address] )  Assessment & Plan:  Lab Results   Component Value Date    WBC 6 82 2022    HGB 13 4 2022    HCT 40 8 2022    MCV 92 2022     2022   plt stable will monitor        5  Benign prostatic hyperplasia, unspecified whether lower urinary tract symptoms present  -     tamsulosin (FLOMAX) 0 4 mg; Take 1 capsule (0 4 mg total) by mouth daily with dinner    6  Mild protein-calorie malnutrition ([address] )    I personally saw this patient with resident  We reviewed care, documentation, notes assessment and plan  I provided guidance and supervision  I agree with the note and finding    Care plan is done under my direct guidance      Discussion Summary and Physician Progress Note      Tashia Dozier  CSN #:                  [id number]  :                       1957  ADMIT DATE:       3/15/2023 11:18 AM  DISCH DATE:        3/17/2023 1:28 PM  RESPONDING  PROVIDER #:        Effie Llamas MD          QUERY TEXT:    Pt admitted with A Fib. Noted documentation in Oncology consult and progress   notes reflecting, \"He also has leukocytosis, a component of his   myeloproliferative disorder. \" Patient continues to have leukocytosis. Please   indicate if the patient has current diagnosis of chronic myeloproliferative   disorder or if this diagnosis is past medical history only. The medical record reflects the following:  Risk Factors: Chronic myeloproliferative disorder, polycythemia vera  Clinical Indicators: WBC 47.01 50.7 42.2 36.5 HCT 43 45.9 39.7 36.9 Platelet   count 0679 1167 1052 1006. Oncology consult note, Haritha Aguero is a 76 y.o.  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 Current recommendation is for   cytoreductive therapy with Hydrea, aspirin and and phlebotomize for hematocrit   >45. Currently taking Hydrea 500 mg p.o. twice daily and aspirin 81 mg p.o.   daily. Forrest Carey last required a phlebotomy on 2022 for hematocrit of 45. 3. \"   Also Oncology consult note states, \"He also has  Treatment: Oncology patient,  cytoreductive therapy with Hydrea, aspirin and   and phlebotomize for hematocrit >45. Currently taking Hydrea 500 mg p.o. twice   daily and aspirin 81 mg p.o. daily. Hydrea now increased to 1000 mg PO BID.   Options provided:  -- Chronic myeloproliferative disorder is a current diagnosis  -- Chronic myeloproliferative disorder is PMH only  -- Other - I will add my own diagnosis  -- Disagree - Not applicable / Not valid  -- Disagree - Clinically unable to determine / Unknown  -- Refer to Clinical Documentation Reviewer    PROVIDER RESPONSE Plan:  Today's care plan and medications were reviewed with patient in detail and all their questions answered to their satisfaction  I personally saw this patient with resident  We reviewed care, documentation, notes assessment and plan  I provided guidance and supervision  I agree with the note and finding  Care plan is done under my direct guidance agree with note evaluation and finding  Shin's stable rest reasonable CKD reasonable BPH fair anemia is stable cholesterol control continue statin platelet reasonable of medication list were reviewed  Continue to follow closely reviewed advised discussed  One hundred weight loss  PMI 20 diet advised recommend protein powder once a day      Chief Complaint   Patient presents with    Hyperlipidemia    CKD III    GERD    Abnormal Lab     Follow-up CBC and BMP    Follow-up     Knee pain, BPH,      Subjective:  Patient is here for chronic disease management  Irregular heart rhythm: One episode of palpitation a couple week ago  Not lasted long  Went away when he changed position  No palpitation  No chest pain, SOB  Vitamin-D deficiency: Vit D supplementation  Hyperlipidemia: LDL up to 115 from 100  He is following low fat diet  CKD 3: stable at baseline 1 3-1 4  Last creatinine was 1 43  BPH: stable with tamsulosin  Anemia and thrombocytopenia: Stable  Continue to monitor    No further knee problem  A Cologuard 06/11/2021 Negative  Colonoscopy in 3/2022: 1  Single colon polyp removed  2  Extensive diverticulosis    07/22/2022:  GFR 46, creatinine 1 43, CBC normal platelet normal  08/98/7064:  LFT normal, cholesterol 190, ,  06/14/2021 cholesterol 167 triglyceride 58 , creatinine 1 32 BUN 22 GFR 52,  12/21/2020 cholesterol 173 triglyceride 87  creatinine 1 47 rest of the BMP normal GFR 46  08/22/2020 cholesterol 177  creatinine 1 49 GFR 49 rest of the CMP normal 2 1, vitamin-D 47 6, TH 47, magnesium  02/26/2020 TEXT:    Chronic myeloproliferative disorder is a current diagnosis. Query created by: Galilea Ends on 3/21/2023 1:06 PM      Electronically signed by:   Samreen Echavarria MD 3/23/2023 6:13 AM cholesterol 193  HDL 55 creatinine 1 31 rest of the CMP normal GFR 53  01/20/2021:  BUN 19 creatinine 1 47 GFR 46 rest of the BMP normal  September 2020:  CBC was normal with hemoglobin 14 3      Falls Plan of Care: Balance, strength, and gait training instructions were provided  The following portions of the patient's history were reviewed and updated as appropriate: allergies, current medications, past family history, past medical history, past social history, past surgical history and problem list     Review of Systems   All other systems reviewed and are negative          Historical Information   Patient Active Problem List   Diagnosis    Stage 3a chronic kidney disease ([facility] [address] )    Nephrolithiasis    Anemia    Heart burn    BPH (benign prostatic hyperplasia)    Shin esophagus    Hypercholesteremia    Irregular heart beat    Elevated blood pressure reading    Abnormal EKG    Rash    Platelets decreased (HCC)    Herpes    Vitamin D insufficiency    Renal cyst    GERD (gastroesophageal reflux disease)    Mild protein-calorie malnutrition (HCC)     Past Medical History:   Diagnosis Date    Shin's esophagus     BPH (benign prostatic hyperplasia)     Colon polyp     GERD (gastroesophageal reflux disease)     Herpes     Hyperlipidemia     Irregular heart beat     Kidney stone      Past Surgical History:   Procedure Laterality Date    CATARACT EXTRACTION      COLONOSCOPY      EGD AND COLONOSCOPY      LITHOTRIPSY       Social History     Substance and Sexual Activity   Alcohol Use Yes    Alcohol/week: 1 0 - 2 0 standard drink    Types: 1 - 2 Glasses of wine per week    Comment: occas     Social History     Substance and Sexual Activity   Drug Use No     Social History     Tobacco Use   Smoking Status Never Smoker   Smokeless Tobacco Never Used     Family History   Problem Relation Age of Onset    No Known Problems Mother     No Known Problems Father      Health Maintenance Due Topic    Pneumococcal Vaccine: 65+ Years (1 - PCV)    COVID-19 Vaccine (4 - Booster for Moderna series)    Influenza Vaccine (1)      Meds/Allergies       Current Outpatient Medications:     aspirin (ECOTRIN LOW STRENGTH) 81 mg EC tablet, Take 81 mg by mouth daily, Disp: , Rfl:     cholecalciferol (VITAMIN D3) 1,000 units tablet, Take 1,000 Units by mouth daily, Disp: , Rfl:     famotidine (PEPCID) 40 MG tablet, Take 1 tablet (40 mg total) by mouth daily, Disp: 90 tablet, Rfl: 1    pravastatin (PRAVACHOL) 10 mg tablet, Take 1 tablet (10 mg total) by mouth daily, Disp: 90 tablet, Rfl: 3    tamsulosin (FLOMAX) 0 4 mg, Take 1 capsule (0 4 mg total) by mouth daily with dinner, Disp: 90 capsule, Rfl: 1    vitamin B-12 (VITAMIN B-12) 1,000 mcg tablet, Take 1,000 mcg by mouth daily, Disp: , Rfl:     tadalafil (CIALIS) 5 MG tablet, Take 1 tablet (5 mg total) by mouth daily, Disp: 30 tablet, Rfl: 1    tadalafil (CIALIS) 5 MG tablet, Take 1 tablet (5 mg total) by mouth daily as needed for erectile dysfunction, Disp: 90 tablet, Rfl: 1      Objective:    Vitals:   /70 (BP Location: Right arm, Patient Position: Sitting, Cuff Size: Standard)   Pulse 63   Ht 5' 9" (1 753 m)   Wt 63 5 kg (140 lb)   SpO2 98%   BMI 20 67 kg/m²   Body mass index is 20 67 kg/m²  Vitals:    07/18/22 0940   Weight: 63 5 kg (140 lb)       Physical Exam  Vitals reviewed  Constitutional:       General: He is not in acute distress  Appearance: Normal appearance  He is not ill-appearing, toxic-appearing or diaphoretic  HENT:      Head: Normocephalic and atraumatic  Right Ear: External ear normal       Left Ear: External ear normal       Mouth/Throat:      Mouth: Mucous membranes are moist    Eyes:      General:         Right eye: No discharge  Left eye: No discharge  Extraocular Movements: Extraocular movements intact  Cardiovascular:      Rate and Rhythm: Normal rate and regular rhythm        Heart sounds: S1 normal and S2 normal    Pulmonary:      Effort: Pulmonary effort is normal       Breath sounds: Normal breath sounds  No wheezing, rhonchi or rales  Abdominal:      Palpations: Abdomen is soft  There is no mass  Tenderness: There is no abdominal tenderness  There is no rebound  Musculoskeletal:      Cervical back: Neck supple  Right knee: No swelling, deformity, effusion, erythema or crepitus  Normal range of motion  No tenderness  No LCL laxity, MCL laxity, ACL laxity or PCL laxity  Normal meniscus  Right lower leg: No edema  Left lower leg: No edema  Skin:     General: Skin is warm and dry  Coloration: Skin is not jaundiced or pale  Findings: No rash  Neurological:      General: No focal deficit present  Mental Status: He is alert and oriented to person, place, and time  Gait: Gait normal    Psychiatric:         Mood and Affect: Mood normal          Lab Review   Lab on 07/12/2022   Component Date Value Ref Range Status    Sodium 07/12/2022 139  136 - 145 mmol/L Final    Potassium 07/12/2022 5 0  3 5 - 5 3 mmol/L Final    Chloride 07/12/2022 110 (A) 100 - 108 mmol/L Final    CO2 07/12/2022 28  21 - 32 mmol/L Final    ANION GAP 07/12/2022 1 (A) 4 - 13 mmol/L Final    BUN 07/12/2022 23  5 - 25 mg/dL Final    Creatinine 07/12/2022 1 43 (A) 0 60 - 1 30 mg/dL Final    Standardized to IDMS reference method    Glucose, Fasting 07/12/2022 92  65 - 99 mg/dL Final    Specimen collection should occur prior to Sulfasalazine administration due to the potential for falsely depressed results  Specimen collection should occur prior to Sulfapyridine administration due to the potential for falsely elevated results      Calcium 07/12/2022 9 1  8 3 - 10 1 mg/dL Final    eGFR 07/12/2022 46  ml/min/1 73sq m Final    WBC 07/12/2022 6 82  4 31 - 10 16 Thousand/uL Final    RBC 07/12/2022 4 43  3 88 - 5 62 Million/uL Final    Hemoglobin 07/12/2022 13 4  12 0 - 17 0 g/dL Final  Hematocrit 07/12/2022 40 8  36 5 - 49 3 % Final    MCV 07/12/2022 92  82 - 98 fL Final    MCH 07/12/2022 30 2  26 8 - 34 3 pg Final    MCHC 07/12/2022 32 8  31 4 - 37 4 g/dL Final    RDW 07/12/2022 12 5  11 6 - 15 1 % Final    Platelets 16/33/7026 198  149 - 390 Thousands/uL Final    MPV 07/12/2022 10 9  8 9 - 12 7 fL Final         Patient Instructions   Follow with Consultants as per their and our suggestion    Follow up in 12 week(s) or as needed earlier    Follow all instructions as advised and discussed  Take your medications as prescribed  Call the office immediately if you experience any side effects  Ask questions if you do not understand  Keep your scheduled appointment as advised or come sooner if necessary or in doubt  Best time to call for non-urgent matter or questions on weekdays is between 9am and 12 noon  See physician for any new symptoms or worsening of current symptoms  Urgent or emergent situations call 911 and report to nearest emergency room  I spent  30 -40 minutes taking care of this patient including clinical care, conseling, collaboration, chart, lab and consultaion review as appropriate    Patient is to get labs 1 week(s) prior to next visit if advised                 "This note has been constructed using a voice recognition system  Therefore there may be syntax, spelling, and/or grammatical errors   Please call if you have any questions  "

## 2023-03-24 RX ORDER — METOPROLOL SUCCINATE 25 MG/1
TABLET, EXTENDED RELEASE ORAL
Qty: 90 TABLET | Refills: 3 | Status: SHIPPED | OUTPATIENT
Start: 2023-03-24

## 2023-05-05 ENCOUNTER — OFFICE VISIT (OUTPATIENT)
Dept: INTERNAL MEDICINE CLINIC | Facility: CLINIC | Age: 78
End: 2023-05-05

## 2023-05-05 ENCOUNTER — APPOINTMENT (OUTPATIENT)
Dept: LAB | Facility: CLINIC | Age: 78
End: 2023-05-05

## 2023-05-05 VITALS
BODY MASS INDEX: 21.79 KG/M2 | DIASTOLIC BLOOD PRESSURE: 80 MMHG | HEART RATE: 92 BPM | OXYGEN SATURATION: 100 % | HEIGHT: 68 IN | WEIGHT: 143.8 LBS | SYSTOLIC BLOOD PRESSURE: 126 MMHG

## 2023-05-05 DIAGNOSIS — I48.91 ATRIAL FIBRILLATION, UNSPECIFIED TYPE (HCC): ICD-10-CM

## 2023-05-05 DIAGNOSIS — N28.1 RENAL CYST: ICD-10-CM

## 2023-05-05 DIAGNOSIS — N20.0 NEPHROLITHIASIS: ICD-10-CM

## 2023-05-05 DIAGNOSIS — N40.0 BENIGN PROSTATIC HYPERPLASIA, UNSPECIFIED WHETHER LOWER URINARY TRACT SYMPTOMS PRESENT: ICD-10-CM

## 2023-05-05 DIAGNOSIS — R63.4 WEIGHT LOSS: ICD-10-CM

## 2023-05-05 DIAGNOSIS — E78.00 HYPERCHOLESTEREMIA: ICD-10-CM

## 2023-05-05 DIAGNOSIS — N18.31 STAGE 3A CHRONIC KIDNEY DISEASE (HCC): ICD-10-CM

## 2023-05-05 DIAGNOSIS — Z00.00 MEDICARE ANNUAL WELLNESS VISIT, SUBSEQUENT: ICD-10-CM

## 2023-05-05 DIAGNOSIS — R12 HEART BURN: ICD-10-CM

## 2023-05-05 DIAGNOSIS — K22.70 BARRETT'S ESOPHAGUS WITHOUT DYSPLASIA: ICD-10-CM

## 2023-05-05 DIAGNOSIS — K21.9 GASTROESOPHAGEAL REFLUX DISEASE WITHOUT ESOPHAGITIS: ICD-10-CM

## 2023-05-05 DIAGNOSIS — N18.31 STAGE 3A CHRONIC KIDNEY DISEASE (HCC): Primary | ICD-10-CM

## 2023-05-05 DIAGNOSIS — R31.0 GROSS HEMATURIA: ICD-10-CM

## 2023-05-05 DIAGNOSIS — D69.6 PLATELETS DECREASED (HCC): ICD-10-CM

## 2023-05-05 LAB
BACTERIA UR QL AUTO: NORMAL /HPF
BILIRUB UR QL STRIP: NEGATIVE
CLARITY UR: CLEAR
COLOR UR: COLORLESS
ERYTHROCYTE [DISTWIDTH] IN BLOOD BY AUTOMATED COUNT: 12.5 % (ref 11.6–15.1)
GLUCOSE UR STRIP-MCNC: NEGATIVE MG/DL
HCT VFR BLD AUTO: 41.4 % (ref 36.5–49.3)
HGB BLD-MCNC: 13.7 G/DL (ref 12–17)
HGB UR QL STRIP.AUTO: ABNORMAL
KETONES UR STRIP-MCNC: NEGATIVE MG/DL
LEUKOCYTE ESTERASE UR QL STRIP: NEGATIVE
MCH RBC QN AUTO: 30.2 PG (ref 26.8–34.3)
MCHC RBC AUTO-ENTMCNC: 33.1 G/DL (ref 31.4–37.4)
MCV RBC AUTO: 91 FL (ref 82–98)
NITRITE UR QL STRIP: NEGATIVE
NON-SQ EPI CELLS URNS QL MICRO: NORMAL /HPF
PH UR STRIP.AUTO: 6 [PH]
PLATELET # BLD AUTO: 211 THOUSANDS/UL (ref 149–390)
PMV BLD AUTO: 11.4 FL (ref 8.9–12.7)
PROT UR STRIP-MCNC: NEGATIVE MG/DL
RBC # BLD AUTO: 4.53 MILLION/UL (ref 3.88–5.62)
RBC #/AREA URNS AUTO: NORMAL /HPF
SP GR UR STRIP.AUTO: 1.01 (ref 1–1.03)
UROBILINOGEN UR STRIP-ACNC: <2 MG/DL
WBC # BLD AUTO: 7.21 THOUSAND/UL (ref 4.31–10.16)
WBC #/AREA URNS AUTO: NORMAL /HPF

## 2023-05-05 RX ORDER — FAMOTIDINE 40 MG/1
40 TABLET, FILM COATED ORAL DAILY
Qty: 90 TABLET | Refills: 1 | Status: SHIPPED | OUTPATIENT
Start: 2023-05-05

## 2023-05-05 NOTE — ASSESSMENT & PLAN NOTE
Except 3-4 times at nighttime  Stream variable  Goes to the bathroom 10 times a day  Take no hesitancy      Sometimes has to wait couple of minutes episode of hematuria see below

## 2023-05-05 NOTE — PATIENT INSTRUCTIONS
Go for the blood test and urine test today  Call and set up appointment with new urologist regarding your hip episode of hematuria, history of kidney stone, symptomatic BPH, despite being on Flomax as well as history of renal cyst     We will also set up ultrasound of bladder kidney  If you have any further problems call me or go to the emergency room  Follow with Consultants as per their and our suggestion    Follow up in 12 week(s) or as needed earlier    Follow all instructions as advised and discussed  Take your medications as prescribed  Call the office immediately if you experience any side effects  Ask questions if you do not understand  Keep your scheduled appointment as advised or come sooner if necessary or in doubt  Best time to call for non-urgent matter or questions on weekdays is between 9am and 12 noon  See physician for any new symptoms or worsening of current symptoms  Urgent or emergent situations call 911 and report to nearest emergency room  I spent  time taking care of this patient including clinical care, conseling, collaboration, chart, lab and consultant's follow up note,images report, documentation, pre visit  review as appropriate  Patient is to get labs 1 week(s) prior to next visit if advised       Medicare Preventive Visit Patient Instructions  Thank you for completing your Welcome to Medicare Visit or Medicare Annual Wellness Visit today  Your next wellness visit will be due in one year (5/5/2024)  The screening/preventive services that you may require over the next 5-10 years are detailed below  Some tests may not apply to you based off risk factors and/or age  Screening tests ordered at today's visit but not completed yet may show as past due  Also, please note that scanned in results may not display below    Preventive Screenings:  Service Recommendations Previous Testing/Comments   Colorectal Cancer Screening  Colonoscopy    Fecal Occult Blood Test (FOBT)/Fecal Immunochemical Test (FIT)  Fecal DNA/Cologuard Test  Flexible Sigmoidoscopy Age: 39-70 years old   Colonoscopy: every 10 years (May be performed more frequently if at higher risk)  OR  FOBT/FIT: every 1 year  OR  Cologuard: every 3 years  OR  Sigmoidoscopy: every 5 years  Screening may be recommended earlier than age 39 if at higher risk for colorectal cancer  Also, an individualized decision between you and your healthcare provider will decide whether screening between the ages of 74-80 would be appropriate  Colonoscopy: 04/13/2022  FOBT/FIT: Not on file  Cologuard: 06/08/2021  Sigmoidoscopy: Not on file          Prostate Cancer Screening Individualized decision between patient and health care provider in men between ages of 53-78   Medicare will cover every 12 months beginning on the day after your 50th birthday PSA: 1 2 ng/mL           Hepatitis C Screening Once for adults born between 1945 and 1965  More frequently in patients at high risk for Hepatitis C Hep C Antibody: 06/14/2021        Diabetes Screening 1-2 times per year if you're at risk for diabetes or have pre-diabetes Fasting glucose: 95 mg/dL (1/6/2023)  A1C: No results in last 5 years (No results in last 5 years)      Cholesterol Screening Once every 5 years if you don't have a lipid disorder  May order more often based on risk factors  Lipid panel: 01/06/2023         Other Preventive Screenings Covered by Medicare:  Abdominal Aortic Aneurysm (AAA) Screening: covered once if your at risk  You're considered to be at risk if you have a family history of AAA or a male between the age of 73-68 who smoking at least 100 cigarettes in your lifetime    Lung Cancer Screening: covers low dose CT scan once per year if you meet all of the following conditions: (1) Age 50-69; (2) No signs or symptoms of lung cancer; (3) Current smoker or have quit smoking within the last 15 years; (4) You have a tobacco smoking history of at least 20 pack years (packs per day x number of years you smoked); (5) You get a written order from a healthcare provider  Glaucoma Screening: covered annually if you're considered high risk: (1) You have diabetes OR (2) Family history of glaucoma OR (3)  aged 48 and older OR (3)  American aged 72 and older  Osteoporosis Screening: covered every 2 years if you meet one of the following conditions: (1) Have a vertebral abnormality; (2) On glucocorticoid therapy for more than 3 months; (3) Have primary hyperparathyroidism; (4) On osteoporosis medications and need to assess response to drug therapy  HIV Screening: covered annually if you're between the age of 12-76  Also covered annually if you are younger than 13 and older than 72 with risk factors for HIV infection  For pregnant patients, it is covered up to 3 times per pregnancy  Immunizations:  Immunization Recommendations   Influenza Vaccine Annual influenza vaccination during flu season is recommended for all persons aged >= 6 months who do not have contraindications   Pneumococcal Vaccine   * Pneumococcal conjugate vaccine = PCV13 (Prevnar 13), PCV15 (Vaxneuvance), PCV20 (Prevnar 20)  * Pneumococcal polysaccharide vaccine = PPSV23 (Pneumovax) Adults 25-60 years old: 1-3 doses may be recommended based on certain risk factors  Adults 72 years old: 1-2 doses may be recommended based off what pneumonia vaccine you previously received   Hepatitis B Vaccine 3 dose series if at intermediate or high risk (ex: diabetes, end stage renal disease, liver disease)   Tetanus (Td) Vaccine - COST NOT COVERED BY MEDICARE PART B Following completion of primary series, a booster dose should be given every 10 years to maintain immunity against tetanus  Td may also be given as tetanus wound prophylaxis  Tdap Vaccine - COST NOT COVERED BY MEDICARE PART B Recommended at least once for all adults  For pregnant patients, recommended with each pregnancy     Shingles Vaccine (Shingrix) - COST NOT COVERED BY MEDICARE PART B  2 shot series recommended in those aged 48 and above     Health Maintenance Due:      Topic Date Due    Hepatitis C Screening  Completed    Colorectal Cancer Screening  Discontinued     Immunizations Due:      Topic Date Due    Pneumococcal Vaccine: 65+ Years (1 - PCV) Never done    COVID-19 Vaccine (5 - Booster for Karine Jesús series) 02/11/2022     Advance Directives   What are advance directives? Advance directives are legal documents that state your wishes and plans for medical care  These plans are made ahead of time in case you lose your ability to make decisions for yourself  Advance directives can apply to any medical decision, such as the treatments you want, and if you want to donate organs  What are the types of advance directives? There are many types of advance directives, and each state has rules about how to use them  You may choose a combination of any of the following:  Living will: This is a written record of the treatment you want  You can also choose which treatments you do not want, which to limit, and which to stop at a certain time  This includes surgery, medicine, IV fluid, and tube feedings  Durable power of  for healthcare [facility]): This is a written record that states who you want to make healthcare choices for you when you are unable to make them for yourself  This person, called a proxy, is usually a family member or a friend  You may choose more than 1 proxy  Do not resuscitate (DNR) order:  A DNR order is used in case your heart stops beating or you stop breathing  It is a request not to have certain forms of treatment, such as CPR  A DNR order may be included in other types of advance directives  Medical directive: This covers the care that you want if you are in a coma, near death, or unable to make decisions for yourself  You can list the treatments you want for each condition   Treatment may include pain medicine, surgery, blood transfusions, dialysis, IV or tube feedings, and a ventilator (breathing machine)  Values history: This document has questions about your views, beliefs, and how you feel and think about life  This information can help others choose the care that you would choose  Why are advance directives important? An advance directive helps you control your care  Although spoken wishes may be used, it is better to have your wishes written down  Spoken wishes can be misunderstood, or not followed  Treatments may be given even if you do not want them  An advance directive may make it easier for your family to make difficult choices about your care  © Copyright [employer] 2018 Information is for End User's use only and may not be sold, redistributed or otherwise used for commercial purposes   All illustrations and images included in CareNotes® are the copyrighted property of [employer]  or [address] [place] [address]

## 2023-05-05 NOTE — ASSESSMENT & PLAN NOTE
Lab Results   Component Value Date    LDLCALC 82 01/06/2023     Lab Results   Component Value Date    CHOLESTEROL 154 01/06/2023    CHOLESTEROL 190 01/13/2022    CHOLESTEROL 167 06/14/2021     Lab Results   Component Value Date    HDL 57 01/06/2023    HDL 50 01/13/2022    HDL 56 06/14/2021     Lab Results   Component Value Date    TRIG 75 01/06/2023    TRIG 126 01/13/2022    TRIG 56 06/14/2021     Lab Results   Component Value Date    NONHDLC 97 01/06/2023    [place] 140 01/13/2022    [place] 111 06/14/2021     Lab Results   Component Value Date    ALT 25 01/06/2023   Continue Prostin 10 mg daily we will check direct LDL

## 2023-05-05 NOTE — ASSESSMENT & PLAN NOTE
Lab Results   Component Value Date    EGFR 51 01/06/2023    EGFR 44 10/24/2022    EGFR 59 08/30/2022    CREATININE 1 31 (H) 01/06/2023    CREATININE 1 50 (H) 10/24/2022    CREATININE 1 18 08/30/2022   Denies any chest pain palpitation PND orthopnea  Atrial fibrillation pulse controlled rate remains on anticoagulation Xarelto 20 mg daily also remains on metoprolol XL 25 mg daily  Patient will continue to follow with cardiologist   Hypertension well controlled

## 2023-05-05 NOTE — ASSESSMENT & PLAN NOTE
No significant acid reflux symptoms  Last EGD 4/13/2022      We will continue famotidine 40 mg daily

## 2023-05-05 NOTE — ASSESSMENT & PLAN NOTE
Denies any chest pain palpitation PND orthopnea  Atrial fibrillation pulse controlled rate remains on anticoagulation Xarelto 20 mg daily also remains on metoprolol XL 25 mg daily  Patient will continue to follow with cardiologist   Hypertension well controlled

## 2023-05-05 NOTE — PROGRESS NOTES
Assessment and Plan:     Problem List Items Addressed This Visit        Digestive    Shin esophagus     No significant acid reflux symptoms  Last EGD 4/13/2022  We will continue famotidine 40 mg daily         Relevant Medications    famotidine (PEPCID) 40 MG tablet    GERD (gastroesophageal reflux disease)     See above         Relevant Medications    famotidine (PEPCID) 40 MG tablet       Cardiovascular and Mediastinum    Atrial fibrillation ([facility] )     Denies any chest pain palpitation PND orthopnea  Atrial fibrillation pulse controlled rate remains on anticoagulation Xarelto 20 mg daily also remains on metoprolol XL 25 mg daily  Patient will continue to follow with cardiologist   Hypertension well controlled  Relevant Orders    CBC       Genitourinary    Stage 3a chronic kidney disease ([facility] ) - Primary     Lab Results   Component Value Date    EGFR 51 01/06/2023    EGFR 44 10/24/2022    EGFR 59 08/30/2022    CREATININE 1 31 (H) 01/06/2023    CREATININE 1 50 (H) 10/24/2022    CREATININE 1 18 08/30/2022   Denies any chest pain palpitation PND orthopnea  Atrial fibrillation pulse controlled rate remains on anticoagulation Xarelto 20 mg daily also remains on metoprolol XL 25 mg daily  Patient will continue to follow with cardiologist   Hypertension well controlled  Relevant Orders    Comprehensive metabolic panel    Nephrolithiasis     To go to the bathroom see below         Relevant Orders    CBC    Comprehensive metabolic panel    UA (URINE) with reflex to Scope    US kidney and bladder with pvr    Ambulatory referral to Urology    BPH (benign prostatic hyperplasia)     Except 3-4 times at nighttime  Stream variable  Goes to the bathroom 10 times a day  Take no hesitancy      Sometimes has to wait couple of minutes episode of hematuria see below         Relevant Orders    CBC    Comprehensive metabolic panel    UA (URINE) with reflex to Scope    US kidney and bladder with pvr Ambulatory referral to Urology    Renal cyst     See below         Gross hematuria     16 hours hematuria in March- on xarelto, no other urinary sx, had a glass of   Rec:  #1 CBC CMP urine analysis and LDL cholesterol today  2   Ultrasound of kidney and bladder with postvoid residual     3   Refer to the urologist Dr Barbara Villavicencio  If he has any further gross blood in the urine he needs to go to the ER      He does have a history of nephrolithiasis, BPH and renal cyst         Relevant Orders    CBC    Comprehensive metabolic panel    UA (URINE) with reflex to Scope    US kidney and bladder with pvr    Ambulatory referral to Urology       Other    Hypercholesteremia     Lab Results   Component Value Date    LDLCALC 82 01/06/2023     Lab Results   Component Value Date    CHOLESTEROL 154 01/06/2023    CHOLESTEROL 190 01/13/2022    CHOLESTEROL 167 06/14/2021     Lab Results   Component Value Date    HDL 57 01/06/2023    HDL 50 01/13/2022    HDL 56 06/14/2021     Lab Results   Component Value Date    TRIG 75 01/06/2023    TRIG 126 01/13/2022    TRIG 56 06/14/2021     Lab Results   Component Value Date    NONHDLC 97 01/06/2023    [place] 140 01/13/2022    [place] 111 06/14/2021     Lab Results   Component Value Date    ALT 25 01/06/2023   Continue Prostin 10 mg daily we will check direct LDL           Relevant Orders    LDL cholesterol, direct    Platelets decreased ([address] )     Lab Results   Component Value Date    WBC 6 94 10/24/2022    HGB 14 3 10/24/2022    HCT 43 3 10/24/2022    MCV 92 10/24/2022     10/24/2022   Remote history of low platelet platelet last time were normal   Recent hematuria we will recheck CBC           Relevant Orders    CBC    Weight loss     Weight is stable        Other Visit Diagnoses     Medicare annual wellness visit, subsequent        Heart burn        Relevant Medications    famotidine (PEPCID) 40 MG tablet           Preventive health issues were discussed with patient, and age appropriate screening tests were ordered as noted in patient's After Visit Summary  Personalized health advice and appropriate referrals for health education or preventive services given if needed, as noted in patient's After Visit Summary  History of Present Illness:     Patient presents for a Medicare Wellness Visit            Here for chronic disease management as well as the Medicare wellness as well as a new complaint of hematuria  Patient responded as episode of hematuria in end of March for 16 hours  Went away did not have any associated UTI symptoms of fever chills nausea vomiting back pain weight loss  He is a known case of BPH BPH asymptomatic goes to the bathroom 3 times at nighttime 8-10 times during daytime stream is not strong or variable at times  Remains on Flomax  Known case of also nephrolithiasis no symptoms of nephrolithiasis  Also known case of renal cyst   We discussed at length  Despite being on Xarelto we need to explore other possibilities  Will do CBC CMP urine analysis will refer to the urologist will do the ultrasound of bladder and kidney is agreeable  GERD fair, Shin's stable remains on famotidine prescription sent to the pharmacy we will check CBC CMP  CKD level 3 will check CBC CMP baseline creatinine 1 3-1 7 being followed by nephrologist         JOSE Crews 06/11/2021 Negative  Colonoscopy in 3/2022: 1  Single colon polyp removed  2  Extensive diverticulosis    1/6/2023: GFR 51 rest of the CMP normal creatinine 1 31 LDL 82,  10/24/2022: CBC normal, PTH 53, calcium 9 1, phosphorus 2 8,  08/29/2022:  CBC normal except platelet 900 is CMP normal except creatinine 1 72 GFR 37 magnesium 1 9 proBNP 694 D-dimer 0 74 TSH 3 72 troponin unremarkable  08/03/2022:  Stress test see full report             Patient Care Team:  Mauri Gore MD as PCP - General (Internal Medicine)  MD Jayy Yoder MD Liborio Real, MD Albino Nick, MD (Nephrology)     Review of Systems:     Review of Systems   Constitutional: Negative for chills, fatigue and fever  HENT: Negative for ear pain and sore throat  Eyes: Negative for pain and visual disturbance  Respiratory: Negative for cough and shortness of breath  Cardiovascular: Negative for chest pain and palpitations  Gastrointestinal: Negative for abdominal pain, constipation, diarrhea and vomiting  Genitourinary: Positive for frequency  Negative for dysuria, hematuria, penile discharge, penile pain, penile swelling, scrotal swelling and testicular pain  Musculoskeletal: Negative for arthralgias, back pain and myalgias  Skin: Negative for color change and rash  Neurological: Negative for dizziness, seizures, syncope and headaches  Psychiatric/Behavioral: Negative for agitation, confusion and hallucinations  All other systems reviewed and are negative         Problem List:     Patient Active Problem List   Diagnosis    Stage 3a chronic kidney disease ([place] [address] )    Nephrolithiasis    BPH (benign prostatic hyperplasia)    Shin esophagus    Hypercholesteremia    Elevated blood pressure reading    Abnormal EKG    Platelets decreased (HCC)    Herpes    Vitamin D insufficiency    Renal cyst    GERD (gastroesophageal reflux disease)    Atrial fibrillation ([place] [address] )    Personal history of colonic polyps    Anxiety    Weight loss    Other male erectile dysfunction    Gross hematuria      Past Medical and Surgical History:     Past Medical History:   Diagnosis Date    A-fib ([place] [address] ) 08/28/2022    Shin's esophagus     BPH (benign prostatic hyperplasia)     Colon polyp     GERD (gastroesophageal reflux disease)     Herpes     Hyperlipidemia     Irregular heart beat     Kidney stone      Past Surgical History:   Procedure Laterality Date    CATARACT EXTRACTION      COLONOSCOPY      EGD AND COLONOSCOPY      LITHOTRIPSY        Family History:     Family History   Problem Relation Age of Onset    No Known Problems Mother     No Known Problems Father       Social History:     Social History     Socioeconomic History    Marital status:      Spouse name: None    Number of children: None    Years of education: None    Highest education level: None   Occupational History    None   Tobacco Use    Smoking status: Never    Smokeless tobacco: Never   Vaping Use    Vaping Use: Never used   Substance and Sexual Activity    Alcohol use: Yes     Alcohol/week: 1 0 - 2 0 standard drink     Types: 1 - 2 Glasses of wine per week     Comment: occas    Drug use: No    Sexual activity: None   Other Topics Concern    None   Social History Narrative    None     Social Determinants of Health     Financial Resource Strain: Low Risk     Difficulty of Paying Living Expenses: Not hard at all   Food Insecurity: Not on file   Transportation Needs: No Transportation Needs    Lack of Transportation (Medical): No    Lack of Transportation (Non-Medical):  No   Physical Activity: Not on file   Stress: Not on file   Social Connections: Not on file   Intimate Partner Violence: Not on file   Housing Stability: Not on file      Medications and Allergies:     Current Outpatient Medications   Medication Sig Dispense Refill    cholecalciferol (VITAMIN D3) 1,000 units tablet Take 1,000 Units by mouth daily      famotidine (PEPCID) 40 MG tablet Take 1 tablet (40 mg total) by mouth daily 90 tablet 1    metoprolol succinate (TOPROL-XL) 25 mg 24 hr tablet Take 1 tablet by mouth once daily 90 tablet 3    pravastatin (PRAVACHOL) 10 mg tablet Take 1 tablet (10 mg total) by mouth daily 90 tablet 3    rivaroxaban (Xarelto) 20 mg tablet Take 1 tablet (20 mg total) by mouth daily with breakfast 90 tablet 1    tadalafil (CIALIS) 20 MG tablet Take 1 tablet (20 mg total) by mouth daily as needed for erectile dysfunction 10 tablet 2    tamsulosin (FLOMAX) 0 4 mg Take 1 capsule (0 4 mg total) by mouth daily with dinner 90 capsule 1    vitamin B-12 (VITAMIN B-12) 1,000 mcg tablet Take 1,000 mcg by mouth daily       No current facility-administered medications for this visit  No Known Allergies   Immunizations:     Immunization History   Administered Date(s) Administered    COVID-19 MODERNA VACC 0 25 ML IM BOOSTER 12/17/2021    COVID-19 MODERNA VACC 0 5 ML IM 02/05/2021, 03/05/2021, 12/17/2021    INFLUENZA 10/24/2022    Influenza Split High Dose Preservative Free IM 10/18/2019    Influenza, high dose seasonal 0 7 mL 09/18/2020, 10/05/2021, 10/24/2022    Tdap 05/27/2021      Health Maintenance:         Topic Date Due    Hepatitis C Screening  Completed    Colorectal Cancer Screening  Discontinued         Topic Date Due    Pneumococcal Vaccine: 65+ Years (1 - PCV) Never done    COVID-19 Vaccine (5 - Booster for Gweneth Boeck series) 02/11/2022      Medicare Screening Tests and Risk Assessments:     Awais Turpin is here for his Subsequent Wellness visit  Last Medicare Wellness visit information reviewed, patient interviewed and updates made to the record today  Health Risk Assessment:   Patient rates overall health as excellent  Patient feels that their physical health rating is same  Patient is very satisfied with their life  Eyesight was rated as same  Hearing was rated as same  Patient feels that their emotional and mental health rating is same  Patients states they are never, rarely angry  Patient states they are never, rarely unusually tired/fatigued  Pain experienced in the last 7 days has been none  Patient states that he has experienced no weight loss or gain in last 6 months  Weight stable    Fall Risk Screening: In the past year, patient has experienced: no history of falling in past year      Home Safety:  Patient does not have trouble with stairs inside or outside of their home  Patient has working smoke alarms and has working carbon monoxide detector  Home safety hazards include: none   No home safety hazards    Nutrition:   Current diet is Regular  Regular diet    Medications:   Patient is currently taking over-the-counter supplements  OTC medications include: see medication list  Patient is able to manage medications  No problems managing meds    Activities of Daily Living (ADLs)/Instrumental Activities of Daily Living (IADLs):   Walk and transfer into and out of bed and chair?: Yes  Dress and groom yourself?: Yes    Bathe or shower yourself?: Yes    Feed yourself? Yes  Do your laundry/housekeeping?: Yes  Manage your money, pay your bills and track your expenses?: Yes  Make your own meals?: Yes    Do your own shopping?: Yes    ADL comments: Pt is independent  Previous Hospitalizations:   Any hospitalizations or ED visits within the last 12 months?: Yes    How many hospitalizations have you had in the last year?: 1-2    Hospitalization Comments: Irregular heart beat    Advance Care Planning:   Living will: Yes    Durable POA for healthcare: Yes    Advanced directive: Yes    Advanced directive counseling given: No    Five wishes given: No    Patient declined ACP directive: Yes    End of Life Decisions reviewed with patient: Yes    Provider agrees with end of life decisions: Yes      Comments:  All docs in place    Cognitive Screening:   Provider or family/friend/caregiver concerned regarding cognition?: No    PREVENTIVE SCREENINGS      Cardiovascular Screening:    General: Screening Not Indicated and History Lipid Disorder      Diabetes Screening:     General: Screening Current      Colorectal Cancer Screening:     General: Risks and Benefits Discussed    Due for: Cologuard      Prostate Cancer Screening:    General: Screening Not Indicated      Osteoporosis Screening:    General: Risks and Benefits Discussed and Screening Not Indicated      Abdominal Aortic Aneurysm (AAA) Screening:        General: Risks and Benefits Discussed and Screening Not Indicated      Lung Cancer Screening:     General: Screening Not "Indicated      Hepatitis C Screening:    General: Screening Current    Screening, Brief Intervention, and Referral to Treatment (SBIRT)    Screening    Typical number of drinks in a week: 10    AUDIT-C Screenin) How often did you have a drink containing alcohol in the past year? 2 to 4 times a month  2) How many drinks did you have on a typical day when you were drinking in the past year? 1 to 2  3) How often did you have 6 or more drinks on one occasion in the past year? never    AUDIT-C Score: 2  Interpretation: Score 0-3 (male): Negative screen for alcohol misuse    Single Item Drug Screening:  How often have you used an illegal drug (including marijuana) or a prescription medication for non-medical reasons in the past year? never    Single Item Drug Screen Score: 0  Interpretation: Negative screen for possible drug use disorder    Brief Intervention  Alcohol & drug use screenings were reviewed  No concerns regarding substance use disorder identified  Other Counseling Topics:   Car/seat belt/driving safety and calcium and vitamin D intake  No results found  Physical Exam:     /80 (BP Location: Left arm, Patient Position: Sitting, Cuff Size: Adult)   Pulse 92   Ht 5' 8\" (1 727 m)   Wt 65 2 kg (143 lb 12 8 oz)   SpO2 100%   BMI 21 86 kg/m²     Physical Exam  Vitals and nursing note reviewed  Constitutional:       Appearance: Normal appearance  He is well-developed and normal weight  He is not ill-appearing  HENT:      Head: Normocephalic and atraumatic  Right Ear: External ear normal       Left Ear: External ear normal       Nose: Nose normal  No rhinorrhea  Mouth/Throat:      Pharynx: Oropharynx is clear  No posterior oropharyngeal erythema  Eyes:      Conjunctiva/sclera: Conjunctivae normal    Cardiovascular:      Rate and Rhythm: Normal rate and regular rhythm  Pulses: Normal pulses  Heart sounds: Normal heart sounds  No murmur heard    Pulmonary:      Effort: " Pulmonary effort is normal       Breath sounds: Normal breath sounds  No wheezing or rales  Abdominal:      General: Bowel sounds are normal  There is no distension  Palpations: Abdomen is soft  Tenderness: There is no abdominal tenderness  Musculoskeletal:         General: No swelling  Normal range of motion  Cervical back: Normal range of motion and neck supple  Right lower leg: No edema  Left lower leg: No edema  Skin:     General: Skin is warm and dry  Capillary Refill: Capillary refill takes less than 2 seconds  Coloration: Skin is not pale  Findings: No erythema or rash  Neurological:      Mental Status: He is alert  Sensory: No sensory deficit        Gait: Gait normal    Psychiatric:         Mood and Affect: Mood normal          Judgment: Judgment normal           Joan Marie MD

## 2023-05-05 NOTE — ASSESSMENT & PLAN NOTE
16 hours hematuria in March- on xarelto, no other urinary sx, had a glass of   Rec:  #1 CBC CMP urine analysis and LDL cholesterol today  2   Ultrasound of kidney and bladder with postvoid residual     3   Refer to the urologist Dr Shirin Lopez  If he has any further gross blood in the urine he needs to go to the ER      He does have a history of nephrolithiasis, BPH and renal cyst

## 2023-05-06 LAB
ALBUMIN SERPL BCP-MCNC: 4.1 G/DL (ref 3.5–5)
ALP SERPL-CCNC: 72 U/L (ref 46–116)
ALT SERPL W P-5'-P-CCNC: 24 U/L (ref 12–78)
ANION GAP SERPL CALCULATED.3IONS-SCNC: 2 MMOL/L (ref 4–13)
AST SERPL W P-5'-P-CCNC: 25 U/L (ref 5–45)
BILIRUB SERPL-MCNC: 0.61 MG/DL (ref 0.2–1)
BUN SERPL-MCNC: 21 MG/DL (ref 5–25)
CALCIUM SERPL-MCNC: 9.4 MG/DL (ref 8.3–10.1)
CHLORIDE SERPL-SCNC: 107 MMOL/L (ref 96–108)
CO2 SERPL-SCNC: 27 MMOL/L (ref 21–32)
CREAT SERPL-MCNC: 1.33 MG/DL (ref 0.6–1.3)
GFR SERPL CREATININE-BSD FRML MDRD: 50 ML/MIN/1.73SQ M
GLUCOSE P FAST SERPL-MCNC: 91 MG/DL (ref 65–99)
LDLC SERPL DIRECT ASSAY-MCNC: 84 MG/DL (ref 0–100)
POTASSIUM SERPL-SCNC: 4.9 MMOL/L (ref 3.5–5.3)
PROT SERPL-MCNC: 7.7 G/DL (ref 6.4–8.4)
SODIUM SERPL-SCNC: 136 MMOL/L (ref 135–147)

## 2023-05-08 ENCOUNTER — TELEPHONE (OUTPATIENT)
Dept: UROLOGY | Facility: AMBULATORY SURGERY CENTER | Age: 78
End: 2023-05-08

## 2023-05-08 NOTE — TELEPHONE ENCOUNTER
Pt called back and stated that he stopped at Dr Cammy Wolf office signed a release form and that they are waiting for our request to be sent to their office     He give the fax number again as [phone]    Pt can be reached at [phone]

## 2023-05-08 NOTE — TELEPHONE ENCOUNTER
New Patient    What is the reason for the patient’s appointment?: Patient is being referred by his PCP because he had one episode of blood in his urine 6 weeks ago but knows why because he said it was something he did  He also has increased frequency all day and night  What office location does the patient prefer?: [place], requesting Dr Aria Hinson    Does patient have Imaging/Lab Results: n/a    Have patient records been requested?:  If No, are the records showing in Epic:  Tae fax number [phone]:  Do we accept the patient's insurance or is the patient Self-Pay?: Yes    Insurance Provider: Medicare and [employer]   Plan Type/Number:  Member ID#:       HISTORY:   Has the patient had any previous Urologist(s)?: Dr Cj Villegas     Was the patient seen in the ED?: No    Has the patient had any outside testing done?: Urine micro, UA, CMP and CBC done on 5/5/23    Does the patient have a personal history of cancer?: Skin cancer    Patient scheduled 6/1 at 11:15 with Dr Aria Hinson in [place]

## 2023-05-08 NOTE — TELEPHONE ENCOUNTER
I called and LVM for patient to call back and let us know when he can stop and sign a release form for us for records

## 2023-05-08 NOTE — TELEPHONE ENCOUNTER
Pt called back and stated that Dr Giselle Oh would like the office to fax over  Request for records for the patient to release his medical records     Fax number is [phone]    If any questions, the patient can be reached at [phone]

## 2023-05-10 ENCOUNTER — HOSPITAL ENCOUNTER (OUTPATIENT)
Dept: RADIOLOGY | Facility: HOSPITAL | Age: 78
Discharge: HOME | End: 2023-05-10
Attending: INTERNAL MEDICINE

## 2023-05-10 DIAGNOSIS — R31.0 GROSS HEMATURIA: ICD-10-CM

## 2023-05-10 DIAGNOSIS — N20.0 NEPHROLITHIASIS: ICD-10-CM

## 2023-05-10 DIAGNOSIS — N40.0 BENIGN PROSTATIC HYPERPLASIA, UNSPECIFIED WHETHER LOWER URINARY TRACT SYMPTOMS PRESENT: ICD-10-CM

## 2023-05-23 NOTE — PATIENT INSTRUCTIONS
Follow with Consultants as per their and our suggestion    Follow up in 12 week(s) or as needed earlier    Follow all instructions as advised and discussed  Take your medications as prescribed  Call the office immediately if you experience any side effects  Ask questions if you do not understand  Keep your scheduled appointment as advised or come sooner if necessary or in doubt  Best time to call for non-urgent matter or questions on weekdays is between 9am and 12 noon  See physician for any new symptoms or worsening of current symptoms  Urgent or emergent situations call 911 and report to nearest emergency room      I spent  30 -40 minutes taking care of this patient including clinical care, conseling, collaboration, chart, lab and consultaion review as appropriate    Patient is to get labs 1 week(s) prior to next visit if advised Imiquimod Counseling:  I discussed with the patient the risks of imiquimod including but not limited to erythema, scaling, itching, weeping, crusting, and pain.  Patient understands that the inflammatory response to imiquimod is variable from person to person and was educated regarded proper titration schedule.  If flu-like symptoms develop, patient knows to discontinue the medication and contact us.

## 2023-05-25 ENCOUNTER — TELEPHONE (OUTPATIENT)
Dept: NEPHROLOGY | Facility: CLINIC | Age: 78
End: 2023-05-25

## 2023-05-25 NOTE — TELEPHONE ENCOUNTER
Spoke with Patient and schedule appointment for 11/28/23 with Dr Rodney Hodgson in the [facility]

## 2023-05-30 DIAGNOSIS — I48.91 ATRIAL FIBRILLATION (HCC): ICD-10-CM

## 2023-06-01 ENCOUNTER — TELEPHONE (OUTPATIENT)
Dept: NEPHROLOGY | Facility: CLINIC | Age: 78
End: 2023-06-01

## 2023-06-01 ENCOUNTER — OFFICE VISIT (OUTPATIENT)
Dept: UROLOGY | Facility: CLINIC | Age: 78
End: 2023-06-01

## 2023-06-01 VITALS
RESPIRATION RATE: 16 BRPM | DIASTOLIC BLOOD PRESSURE: 78 MMHG | WEIGHT: 145.6 LBS | SYSTOLIC BLOOD PRESSURE: 128 MMHG | OXYGEN SATURATION: 98 % | BODY MASS INDEX: 22.07 KG/M2 | HEART RATE: 89 BPM | HEIGHT: 68 IN

## 2023-06-01 DIAGNOSIS — R31.0 GROSS HEMATURIA: ICD-10-CM

## 2023-06-01 DIAGNOSIS — N40.0 BENIGN PROSTATIC HYPERPLASIA, UNSPECIFIED WHETHER LOWER URINARY TRACT SYMPTOMS PRESENT: ICD-10-CM

## 2023-06-01 DIAGNOSIS — N20.0 NEPHROLITHIASIS: ICD-10-CM

## 2023-06-01 LAB
SL AMB  POCT GLUCOSE, UA: NORMAL
SL AMB LEUKOCYTE ESTERASE,UA: NORMAL
SL AMB POCT BILIRUBIN,UA: NORMAL
SL AMB POCT BLOOD,UA: NORMAL
SL AMB POCT CLARITY,UA: CLEAR
SL AMB POCT COLOR,UA: YELLOW
SL AMB POCT KETONES,UA: NORMAL
SL AMB POCT NITRITE,UA: NORMAL
SL AMB POCT PH,UA: 5
SL AMB POCT SPECIFIC GRAVITY,UA: 1015
SL AMB POCT URINE PROTEIN: NORMAL
SL AMB POCT UROBILINOGEN: 0.2

## 2023-06-01 PROCEDURE — 88112 CYTOPATH CELL ENHANCE TECH: CPT | Performed by: PATHOLOGY

## 2023-06-01 NOTE — TELEPHONE ENCOUNTER
Pt called and left a vm stating he received a card in the mail to schedule an appointment, but patient is already scheduled  I returned call but had to leave a message  Explained he can disregard the card since he already is scheduled  Advised to call back if any questions

## 2023-06-01 NOTE — PROGRESS NOTES
Referring Physician: Keith Pollack MD  A copy of this note was sent to the referring physician  Diagnoses and all orders for this visit:    Gross hematuria  -     Ambulatory referral to Urology  -     Cytology, urine  -     POCT urine dip  -     CT abdomen pelvis w wo contrast; Future  -     Creatinine, serum; Future    Benign prostatic hyperplasia, unspecified whether lower urinary tract symptoms present  -     Ambulatory referral to Urology  -     Cytology, urine  -     POCT urine dip  -     CT abdomen pelvis w wo contrast; Future  -     Creatinine, serum; Future    Nephrolithiasis  -     Ambulatory referral to Urology  -     CT abdomen pelvis w wo contrast; Future  -     Creatinine, serum; Future            Assessment and plan:       1  Gross hematuria  -Self resolved  -Persistent microscopic hematuria is noted  - Asymptomatic    2  BPH  -On tamsulosin times years  -Generally doing well but patient is interested in considering additional options (daily Cialis was not efficacious in the past)    #3 medical comorbidities: Atrial fibrillation on chronic anticoagulation, stage III CKD    4  Health/wellness  -Avid hiker, spends significant time outdoors    I recommended flexible cystoscopy for further evaluation  Discussed the risks benefits and alternatives to this diagnostic procedure  Risks include but are not limited to hematuria, pain, urinary tract infection, stricture formation, possible need for hospitalization  Patient verbalized understanding and has elected to proceed  Cystoscopy will be scheduled in the near future      Patient will be also scheduled for a CT urogram to complete his hematuria work-up and evaluate for potential nephrolithiasis as suggested on his ultrasound    Finally he was provided with information about minimally invasive surgical options and he will be evaluated for these procedures at the time of his upcoming cystoscopy in the form of a transrectal ultrasound    Pearl Butler MD      Chief Complaint     Hematuria, BPH      History of Present Illness     Morelia Gillis is a 65 y o  furred in consultation for gross hematuria, known BPH    Detailed Urologic History     - please refer to HPI    Review of Systems     Review of Systems   Constitutional: Negative for activity change and fatigue  HENT: Negative for congestion  Eyes: Negative for visual disturbance  Respiratory: Negative for shortness of breath and wheezing  Cardiovascular: Negative for chest pain and leg swelling  Gastrointestinal: Negative for abdominal pain  Endocrine: Negative for polyuria  Genitourinary: Negative for dysuria, flank pain, hematuria and urgency  Musculoskeletal: Negative for back pain  Allergic/Immunologic: Negative for immunocompromised state  Neurological: Negative for dizziness and numbness  Psychiatric/Behavioral: Negative for dysphoric mood  All other systems reviewed and are negative      AUA SYMPTOM SCORE    Flowsheet Row Most Recent Value   AUA SYMPTOM SCORE    How often have you had a sensation of not emptying your bladder completely after you finished urinating? 3 (P)     How often have you had to urinate again less than two hours after you finished urinating? 3 (P)     How often have you found you stopped and started again several times when you urinate? 3 (P)     How often have you found it difficult to postpone urination? 4 (P)     How often have you had a weak urinary stream? 5 (P)     How often have you had to push or strain to begin urination? 0 (P)     How many times did you most typically get up to urinate from the time you went to bed at night until the time you got up in the morning? 5 (P)     Quality of Life: If you were to spend the rest of your life with your urinary condition just the way it is now, how would you feel about that? 4 (P)     AUA SYMPTOM SCORE 23 (P)             Allergies     No Known Allergies    Physical Exam "      Physical Exam  Constitutional:       General: He is not in acute distress  Appearance: He is well-developed  HENT:      Head: Normocephalic and atraumatic  Cardiovascular:      Comments: Negative lower extremity edema  Pulmonary:      Effort: Pulmonary effort is normal       Breath sounds: Normal breath sounds  Abdominal:      Palpations: Abdomen is soft  Musculoskeletal:         General: Normal range of motion  Cervical back: Normal range of motion  Skin:     General: Skin is warm  Neurological:      Mental Status: He is alert and oriented to person, place, and time     Psychiatric:         Behavior: Behavior normal            Vital Signs  Vitals:    06/01/23 1046   BP: 128/78   BP Location: Left arm   Patient Position: Sitting   Cuff Size: Large   Pulse: 89   Resp: 16   SpO2: 98%   Weight: 66 kg (145 lb 9 6 oz)   Height: 5' 8\" (1 727 m)         Current Medications       Current Outpatient Medications:   •  cholecalciferol (VITAMIN D3) 1,000 units tablet, Take 1,000 Units by mouth daily, Disp: , Rfl:   •  famotidine (PEPCID) 40 MG tablet, Take 1 tablet (40 mg total) by mouth daily, Disp: 90 tablet, Rfl: 1  •  metoprolol succinate (TOPROL-XL) 25 mg 24 hr tablet, Take 1 tablet by mouth once daily, Disp: 90 tablet, Rfl: 3  •  pravastatin (PRAVACHOL) 10 mg tablet, Take 1 tablet (10 mg total) by mouth daily, Disp: 90 tablet, Rfl: 3  •  rivaroxaban (Xarelto) 20 mg tablet, Take 1 tablet (20 mg total) by mouth daily with breakfast, Disp: 90 tablet, Rfl: 1  •  tadalafil (CIALIS) 20 MG tablet, Take 1 tablet (20 mg total) by mouth daily as needed for erectile dysfunction, Disp: 10 tablet, Rfl: 2  •  tamsulosin (FLOMAX) 0 4 mg, Take 1 capsule (0 4 mg total) by mouth daily with dinner, Disp: 90 capsule, Rfl: 1  •  vitamin B-12 (VITAMIN B-12) 1,000 mcg tablet, Take 1,000 mcg by mouth daily, Disp: , Rfl:       Active Problems     Patient Active Problem List   Diagnosis   • Stage 3a chronic kidney disease " [facility])   • Nephrolithiasis   • BPH (benign prostatic hyperplasia)   • Shin esophagus   • Hypercholesteremia   • Elevated blood pressure reading   • Abnormal EKG   • Platelets decreased (HCC)   • Herpes   • Vitamin D insufficiency   • Renal cyst   • GERD (gastroesophageal reflux disease)   • Atrial fibrillation (HCC)   • Personal history of colonic polyps   • Anxiety   • Weight loss   • Other male erectile dysfunction   • Gross hematuria         Past Medical History     Past Medical History:   Diagnosis Date   • A-fib ([address] ) 08/28/2022   • Shin's esophagus    • BPH (benign prostatic hyperplasia)    • Colon polyp    • GERD (gastroesophageal reflux disease)    • Herpes    • Hyperlipidemia    • Irregular heart beat    • Kidney stone          Surgical History     Past Surgical History:   Procedure Laterality Date   • CATARACT EXTRACTION     • COLONOSCOPY     • EGD AND COLONOSCOPY     • LITHOTRIPSY           Family History     Family History   Problem Relation Age of Onset   • No Known Problems Mother    • No Known Problems Father          Social History     Social History     Social History     Tobacco Use   Smoking Status Never   Smokeless Tobacco Never         Pertinent Lab Values     Lab Results   Component Value Date    CREATININE 1 33 (H) 05/05/2023       Lab Results   Component Value Date    PSA 1 2 03/06/2020    PSA 1 3 08/27/2019    PSA 2 1 07/18/2018       @RESULTRCNT(1H])@      Pertinent Imaging       US kidney and bladder with pvr    Result Date: 5/12/2023  Narrative: RENAL ULTRASOUND WITH PVR INDICATION:   R31 0: Gross hematuria N40 0: Benign prostatic hyperplasia without lower urinary tract symptoms N20 0: Calculus of kidney  COMPARISON: Renal ultrasound 1/20/2016 TECHNIQUE:   Ultrasound of the retroperitoneum was performed with a curvilinear transducer utilizing volumetric sweeps and still imaging techniques  FINDINGS: KIDNEYS: Symmetric and normal size  Right kidney:  9 6 x 5 1 x 5 1 cm   Volume 129 1 "mL Left kidney:  9 9 x 4 3 x 4 1 cm  Volume 91 8 mL Right kidney Normal echogenicity and contour  No suspicious mass is identified  Simple cyst in the midportion measuring 1 6 x 1 8 x 1 7 cm  Simple cyst in the midportion measuring 0 7 x 0 9 x 0 8 cm  No hydronephrosis  Echogenic focus in the midportion with twinkle artifact without clear acoustic shadowing could be due to a tiny nonobstructing calculus  No perinephric fluid collections  Left kidney Normal echogenicity and contour  No suspicious mass is identified  Simple cyst in the upper pole measuring 1 9 x 1 8 x 1 7 cm  Simple cyst in the upper pole measuring 1 8 x 1 7 x 1 2 cm  Cyst in the upper pole to midportion with a thin septation measuring 1 2 x 1 2 x 1 2 cm  No hydronephrosis  No shadowing calculi  No perinephric fluid collections  URETERS: Nonvisualized  BLADDER: Normally distended  No focal thickening or mass lesions  Bilateral ureteral jets detected  Prevoid: 112 3 No significant post void volume  Measured post void volume in mL: 32 5 OTHER: Enlarged prostate gland measuring 4 6 x 5 2 x 4 5 cm (57 2 mL)  Impression: 1  No hydronephrosis  Possible punctate nonobstructing right renal calculus  2   Multiple bilateral renal cysts  Workstation performed: [id number]         Portions of the record may have been created with voice recognition software  Occasional wrong word or \"sound a like\" substitutions may have occurred due to the inherent limitations of voice recognition software  In addition some of the content generated from this outpatient encounter includes information designed for patient education and/or communication back to the referring provider  Read the chart carefully and recognize, using context, where substitutions have occurred      "

## 2023-06-05 PROCEDURE — 88112 CYTOPATH CELL ENHANCE TECH: CPT | Performed by: PATHOLOGY

## 2023-06-16 ENCOUNTER — TELEPHONE (OUTPATIENT)
Dept: UROLOGY | Facility: AMBULATORY SURGERY CENTER | Age: 78
End: 2023-06-16

## 2023-06-16 NOTE — TELEPHONE ENCOUNTER
Pt is under the care of: Surjit Barry     Pt was last seen: 06/01/23    Pt had test done on: 06/01/23    Pt would like to know test results     Pt can be reached at: [phone]

## 2023-06-16 NOTE — TELEPHONE ENCOUNTER
Spoke with patient and informed him urine cytology shows atypical cells and to proceed with CT and cysto which will complete the hematuria work up  Patient verbalized understanding and agrees to plan

## 2023-06-30 ENCOUNTER — OFFICE VISIT (OUTPATIENT)
Dept: CARDIOLOGY CLINIC | Facility: CLINIC | Age: 78
End: 2023-06-30
Payer: MEDICARE

## 2023-06-30 VITALS
WEIGHT: 161 LBS | OXYGEN SATURATION: 97 % | HEART RATE: 56 BPM | HEIGHT: 68 IN | DIASTOLIC BLOOD PRESSURE: 68 MMHG | SYSTOLIC BLOOD PRESSURE: 100 MMHG | BODY MASS INDEX: 24.4 KG/M2

## 2023-06-30 DIAGNOSIS — I48.0 PAROXYSMAL ATRIAL FIBRILLATION (HCC): ICD-10-CM

## 2023-06-30 DIAGNOSIS — R00.1 BRADYCARDIA: ICD-10-CM

## 2023-06-30 DIAGNOSIS — N18.30 STAGE 3 CHRONIC KIDNEY DISEASE, UNSPECIFIED WHETHER STAGE 3A OR 3B CKD (HCC): ICD-10-CM

## 2023-06-30 DIAGNOSIS — E78.5 DYSLIPIDEMIA: ICD-10-CM

## 2023-06-30 DIAGNOSIS — Z86.59 HISTORY OF ANXIETY: ICD-10-CM

## 2023-06-30 PROCEDURE — 93000 ELECTROCARDIOGRAM COMPLETE: CPT | Performed by: INTERNAL MEDICINE

## 2023-06-30 PROCEDURE — 99214 OFFICE O/P EST MOD 30 MIN: CPT | Performed by: INTERNAL MEDICINE

## 2023-06-30 NOTE — PROGRESS NOTES
Personal Note - Cardiology Office   [facility]  joe Cardenas 65 y o  male MRN: [id number]  : 1945  Unit/Bed#:  Encounter: [id number]      Assessment:     1  Paroxysmal atrial fibrillation (HCC)    2  Dyslipidemia    3  Bradycardia    4  Stage 3 chronic kidney disease, unspecified whether stage 3a or 3b CKD ([facility] [address] )    5  History of anxiety        Discussion summary and Plan:    1  Paroxysmal atrial fibrillation  Patient was recently admitted to hospital with atrial fibrillation rapid ventricular rate  He spontaneously converted back to sinus rhythm current heart rate 58 beats per minute he is on very low-dose metoprolol and Xarelto  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 but for now he is maintaining sinus rhythm with metoprolol will continue Toprol XL 25 mg daily  Rate is 56 bpm today no change from previous EKG  2   History of anxiety  Currently doing well  3  Dyslipidemia  Continue statin Cresta profile from blood tests of 2023 acceptable  4  Cardiac murmur  Echo Doppler from 2019 reviewed mild valvular disease  Discussed with patient  No new changes  Clinical heart murmurs no change sarah with patient    5  Abnormal EKG with ST flattening in inferior leads with exercise stress test shows no significant EKG abnormality  Nuclear stress test reviewed  Stress test was done in   6  History of anemia with CKD stage 2/3  Patient was admitted at that time his creatinine was elevated he is encouraged to keep himself hydrated  Law labs shows in May 2023 creatinine 1 33  Patient has recent episode of hematuria  He is followed by urology scheduled to have CAT scan no current issues  Follow-up 6 months  Issues related to blood thinner Xarelto discussed understand risk involved  Thank you for your consultation  If you have any question please call me at [phone]    Counseling :  A description of the counseling    Goals and Barriers  Patient's ability to self care: Yes  Medication side effect reviewed with patient in detail and all their questions answered to their satisfaction  Primary Care Physician: Marivel Menon MD      HPI :     Mariajose Cruz is a 65y o  year old male who was referred by primary care doctor for irregular heart beat and racing of high blood pressure  Patient who monitors his blood pressure and heart rate regularly noted that his blood pressure which generally runs around 100-110 was higher and his monitor was also giving irregular heartbeat  He has history of irregular heartbeat and he felt occasionally palpitations and fast heartbeat  He was started on low-dose metoprolol which have significantly decrease his palpitations as well as irregular heartbeat on his monitor and his blood pressure has also improved  He was sent to us for further cardiac workup  He denies any new symptoms no chest pain no shortness of breath no dizziness no lightheadedness no other issues  He does have history of cardiac murmur and has an echo done and found to have mild valvular disease  There is a family history of cardiac problem as his father  suddenly at age of 80  He does not smoke he is a social drinker is very active he does lot of hiking  He has no recent surgery  He lives alone and he has 2 kids who helped him  His cholesterol profile reviewed  His risk for cardiovascular event is around 20% mostly driven by his age  He does have history of anxiety particularly the current situation of the currently bothersome lot  2022  Above reviewed  Patient came for follow-up he was recently admitted to [facility] in 2022 with complaints of palpitation and was found to be in AFib with rapid ventricular rate  He spontaneously went back to sinus rhythm and was added on low-dose metoprolol and Xarelto  Today his heart rate is 58 beats per minute    He has nuclear stress test at that time shows no ischemia EF was 52%  He has mildly elevated troponin  He is tolerating these medications well  He still occasionally get palpitations but they are not as problem he is compliant with his medications  No nausea no vomiting no other cardiovascular issues at this time  He brought his blood pressure diary most of the readings are between  and heart rate has been acceptable  He is encouraged to drink more fluid when he was admitted he was dehydrated  12/15/2022  Above reviewed  Patient came for follow-up  He has medical history significant for proximal atrial fibrillation where he was admitted to [address] [place] [address] August 2022 with atrial fibrillation and spontaneously converted back to sinus rhythm  His other medical history significant for palpitation with previous monitoring shows few PVCs, dyslipidemia, abnormal EKG who came for follow-up  He has blood test done in October 2022 which shows his creatinine is now 1 50 and other electrolytes were acceptable  His creatinine has been up and down  His current medication reviewed he is on Xarelto metoprolol XL, pravastatin Flomax and lorazepam   He is encouraged to drink more fluid because when he was admitted in August he was found to be dehydrated  He is feeling well no new complaints no nausea no vomiting no PND no orthopnea no other cardiovascular issues  6/30/2023  Reviewed  Patient came for follow-up  He had history of paroxysmal atrial fibrillation, spontaneously converted back to sinus them in August 2022, history of palpitations, PVCs, dyslipidemia who came for follow-up  He also history of CRI with baseline creatinine around 1 3-1 5  Blood test done in May 2023 was acceptable  His vitals has been stable  His nuclear stress test done in August 2022 which shows no ischemia  EF was 52%  He has urological issues  Now scheduled to have abdominal CAT scan  He was diagnosed to have gross hematuria  He has been on Xarelto  He feels great heart rate around 56 bpm EKG shows no change from previous EKG  He is pretty compliant with his medications  And he feels great  Review of Systems   Constitutional: Negative for activity change, chills, diaphoresis, fever and unexpected weight change  HENT: Negative for congestion  Eyes: Negative for discharge and redness  Respiratory: Negative for cough, chest tightness, shortness of breath and wheezing  Cardiovascular: Negative  Negative for chest pain, palpitations and leg swelling  Gastrointestinal: Negative for abdominal pain, diarrhea and nausea  Endocrine: Negative  Genitourinary: Negative for decreased urine volume and urgency  Has an episode of hematuria now resolved patient follows with urology   Musculoskeletal: Negative  Negative for arthralgias, back pain and gait problem  Skin: Negative for rash and wound  Allergic/Immunologic: Negative  Neurological: Negative for dizziness, seizures, syncope, weakness, light-headedness and headaches  Hematological: Negative  Psychiatric/Behavioral: Negative for agitation and confusion  The patient is nervous/anxious          Historical Information   Past Medical History:   Diagnosis Date   • A-fib ([address] ) 08/28/2022   • Shin's esophagus    • BPH (benign prostatic hyperplasia)    • Colon polyp    • GERD (gastroesophageal reflux disease)    • Herpes    • Hyperlipidemia    • Irregular heart beat    • Kidney stone      Past Surgical History:   Procedure Laterality Date   • CATARACT EXTRACTION     • COLONOSCOPY     • EGD AND COLONOSCOPY     • LITHOTRIPSY       Social History     Substance and Sexual Activity   Alcohol Use Yes   • Alcohol/week: 1 0 - 2 0 standard drink of alcohol   • Types: 1 - 2 Glasses of wine per week    Comment: occas     Social History     Substance and Sexual Activity   Drug Use No     Social History     Tobacco Use   Smoking Status Never   Smokeless Tobacco Never     Family History: "  Family History   Problem Relation Age of Onset   • No Known Problems Mother    • No Known Problems Father        Meds/Allergies     No Known Allergies    Current Outpatient Medications:   •  cholecalciferol (VITAMIN D3) 1,000 units tablet, Take 1,000 Units by mouth daily, Disp: , Rfl:   •  famotidine (PEPCID) 40 MG tablet, Take 1 tablet (40 mg total) by mouth daily, Disp: 90 tablet, Rfl: 1  •  metoprolol succinate (TOPROL-XL) 25 mg 24 hr tablet, Take 1 tablet by mouth once daily, Disp: 90 tablet, Rfl: 3  •  pravastatin (PRAVACHOL) 10 mg tablet, Take 1 tablet (10 mg total) by mouth daily, Disp: 90 tablet, Rfl: 3  •  rivaroxaban (Xarelto) 20 mg tablet, Take 1 tablet (20 mg total) by mouth daily with breakfast, Disp: 90 tablet, Rfl: 1  •  tadalafil (CIALIS) 20 MG tablet, Take 1 tablet (20 mg total) by mouth daily as needed for erectile dysfunction, Disp: 10 tablet, Rfl: 2  •  tamsulosin (FLOMAX) 0 4 mg, Take 1 capsule (0 4 mg total) by mouth daily with dinner, Disp: 90 capsule, Rfl: 1  •  vitamin B-12 (VITAMIN B-12) 1,000 mcg tablet, Take 1,000 mcg by mouth daily, Disp: , Rfl:     Vitals: Blood pressure 100/68, pulse 56, height 5' 8\" (1 727 m), weight 73 kg (161 lb), SpO2 97 %  ?  Body mass index is 24 48 kg/m²  Vitals:    06/30/23 1104   Weight: 73 kg (161 lb)     BP Readings from Last 3 Encounters:   06/30/23 100/68   06/01/23 128/78   05/05/23 126/80         Physical Exam  Constitutional:       General: He is not in acute distress  Appearance: He is well-developed  He is not diaphoretic  Neck:      Thyroid: No thyromegaly  Vascular: No JVD  Trachea: No tracheal deviation  Cardiovascular:      Rate and Rhythm: Normal rate and regular rhythm  Heart sounds: S1 normal and S2 normal  Heart sounds not distant  Murmur heard  Systolic (ejection) murmur is present with a grade of 2/6  No friction rub  No gallop  No S3 or S4 sounds     Pulmonary:      Effort: Pulmonary effort is normal  No " respiratory distress  Breath sounds: Normal breath sounds  No wheezing or rales  Chest:      Chest wall: No tenderness  Abdominal:      General: Bowel sounds are normal  There is no distension  Palpations: Abdomen is soft  Tenderness: There is no abdominal tenderness  Musculoskeletal:         General: No deformity  Cervical back: Neck supple  Skin:     General: Skin is warm and dry  Coloration: Skin is not pale  Findings: No rash  Neurological:      Mental Status: He is alert and oriented to person, place, and time  Psychiatric:         Behavior: Behavior normal          Judgment: Judgment normal          Diagnostic Studies Review Cardio:  Echo reviewed    Two thousand nineteen shows EF 60%, left atrium moderately dilated, no significant valvular disease other than trace MR trace AI and trace TR  Exercise stress test   Exercise stress test done 09/14/2020 shows normal exercise stress test   He exercised for about 6 minutes  Nuclear stress test August 30, 2022 was normal with EF around 52%        Holter monitor  Holter monitor shows dominant rhythm is sinus rhythm few PACs and PVCs there were less than 2% of the total beats  Short atrial runs were noted there were nonsustained  EKG:  Normal sinus rhythm heart rate 67 beats per minute  Minimal voltage for LVH T-wave abnormality in inferior leads  Twelve lead EKG done on 02/08/2022 shows normal sinus rhythm with sinus arrhythmia heart rate 61 beats per minute LVH by voltage  T-wave inversion noted in inferior leads  No change from previous EKG    Lead EKG 09/20/2022 shows normal sinus rhythm heart rate 58 beats per minute LVH by voltage T-wave inversion inferior leads  Twelve-lead EKG done on 6/30/2023 shows normal sinus them heart rate 56 bpm T wave normality inferior leads not changed from previous EKG      Cardiac testing:   Results for orders placed during the hospital encounter of 05/24/19   Echo complete with contrast if indicated    Narrative [address]  [address] [facility]  [phone]    Transthoracic Echocardiogram  2D, M-mode, Doppler, and Color Doppler    Study date:  24-May-2019    Patient: No Garrido  MR number: [id number]  Account number: [de-identified]  : 1945  Age: 75 years  Gender: Male  Status: Outpatient  Location: Echo lab  Height: 70 in  Weight: 149 6 lb  BP: 110/ 70 mmHg    Indications: CVA    Diagnoses: I67 9 - Cerebrovascular disease, unspecified    Sonographer:  JELANI Guerrero  Primary Physician: Crissy Cabrera MD  Referring Physician: Crissy Cabrera MD  Group:  [facility]  Interpreting Physician:  Fabiola Lazo MD    SUMMARY    LEFT VENTRICLE:  Systolic function was normal  Ejection fraction was estimated in the range of 55 % to 60 % to be 60 %  There were no regional wall motion abnormalities  Doppler parameters were consistent with abnormal left ventricular relaxation (grade 1 diastolic dysfunction)  LEFT ATRIUM:  The atrium was moderately dilated  RIGHT ATRIUM:  The atrium was mildly dilated  MITRAL VALVE:  There was trace regurgitation  AORTIC VALVE:  There was trace regurgitation  TRICUSPID VALVE:  There was trace regurgitation  The tricuspid jet envelope definition was inadequate for estimation of RV systolic pressure  There are no indirect findings (abnormal RV volume or geometry, altered pulmonary flow velocity profile, or leftward septal displacement) which  would suggest moderate or severe pulmonary hypertension  HISTORY: PRIOR HISTORY: CKD, BPH, GERD, Herpes    PROCEDURE: The procedure was performed in the echo lab  This was a routine study  The transthoracic approach was used  The study included complete 2D imaging, M-mode, complete spectral Doppler, and color Doppler  The heart rate was 68 bpm,  at the start of the study  Image quality was adequate      LEFT VENTRICLE: Size was normal  Systolic function was normal  Ejection fraction was estimated in the range of 55 % to 60 % to be 60 %  There were no regional wall motion abnormalities  Wall thickness was normal  No evidence of apical  thrombus  DOPPLER: Doppler parameters were consistent with abnormal left ventricular relaxation (grade 1 diastolic dysfunction)  RIGHT VENTRICLE: The size was normal  Systolic function was normal  Wall thickness was normal     LEFT ATRIUM: The atrium was moderately dilated  RIGHT ATRIUM: The atrium was mildly dilated  MITRAL VALVE: There was mild thickening  There was normal leaflet separation  DOPPLER: The transmitral velocity was within the normal range  There was no evidence for stenosis  There was trace regurgitation  AORTIC VALVE: The valve was trileaflet  Leaflets exhibited mildly increased thickness, mild calcification, and normal cuspal separation  DOPPLER: Transaortic velocity was within the normal range  There was no evidence for stenosis  There  was trace regurgitation  TRICUSPID VALVE: The valve structure was normal  There was normal leaflet separation  DOPPLER: The transtricuspid velocity was within the normal range  There was no evidence for stenosis  There was trace regurgitation  The tricuspid jet  envelope definition was inadequate for estimation of RV systolic pressure  There are no indirect findings (abnormal RV volume or geometry, altered pulmonary flow velocity profile, or leftward septal displacement) which would suggest  moderate or severe pulmonary hypertension  PULMONIC VALVE: Leaflets exhibited normal thickness, no calcification, and normal cuspal separation  DOPPLER: The transpulmonic velocity was within the normal range  There was no significant regurgitation  PERICARDIUM: There was no pericardial effusion  The pericardium was normal in appearance  AORTA: The root exhibited normal size      SYSTEMIC VEINS: IVC: The inferior vena cava was normal in size     SYSTEM MEASUREMENT TABLES    2D mode  AoR Diam 2D: 3 6 cm  LA Diam (2D): 4 5 cm  LA/Ao (2D): 1 25  FS (2D Teich): 28 2 %  IVSd (2D): 0 96 cm  LVDEV: 94 4 cmï¾³  LVESV: 42 8 cmï¾³  LVIDd(2D): 4 54 cm  LVISd (2D): 3 26 cm  LVPWd (2D): 1 cm  SV (Teich): 51 6 cmï¾³    Apical four chamber  LVEF A4C: 62 %    Unspecified Scan Mode  MV Peak A Jason: 843 mm/s  MV Peak E Jason  Mean: 747 mm/s  MVA (PHT): 3 38 cmï¾²  PHT: 65 ms  Max P mm[Hg]  V Max: 2170 mm/s  Vmax: 2370 mm/s  RA Area: 18 cmï¾²  RA Volume: 51 7 cmï¾³  TAPSE: 2 2 cm    [facility] Acc[facility]    Prepared and electronically signed by    Evan Spicer MD  Signed 26-May-2019 16:36:08         Imaging:  Chest X-Ray:   No Chest XR results available for this patient  CT-scan of the chest:     No CTA results available for this patient    Lab Review   Lab Results   Component Value Date    WBC 7 21 2023    HGB 13 7 2023    HCT 41 4 2023    MCV 91 2023    RDW 12 5 2023     2023     BMP:  Lab Results   Component Value Date    SODIUM 136 2023    K 4 9 2023     2023    CO2 27 2023    BUN 21 2023    CREATININE 1 33 (H) 2023    GLUC 97 2022    GLUF 91 2023    CALCIUM 9 4 2023    EGFR 50 2023    MG 2 2 10/24/2022     LFT:  Lab Results   Component Value Date    AST 25 2023    ALT 24 2023    ALKPHOS 72 2023    TP 7 7 2023    ALB 4 1 2023      Lab Results   Component Value Date    VZE4PIOHHWNG 3 728 2022     Lab Results   Component Value Date    HGBA1C 6 0 2016     Lipid Profile:   Lab Results   Component Value Date    CHOLESTEROL 154 2023    HDL 57 2023    LDLCALC 82 2023    TRIG 75 2023     Lab Results   Component Value Date    CHOLESTEROL 154 2023    CHOLESTEROL 190 2022     Lab Results   Component Value Date    CKTOTAL 130 2019    CKMB "2 7 06/23/2016    CKMBINDEX 1 3 06/23/2016           Dr Margaux Arora MD [facility]      \"This note has been constructed using a voice recognition system  Therefore there may be syntax, spelling, and/or grammatical errors  Please call if you have any questions   \"  "

## 2023-08-07 ENCOUNTER — OFFICE VISIT (OUTPATIENT)
Dept: INTERNAL MEDICINE CLINIC | Facility: CLINIC | Age: 78
End: 2023-08-07
Payer: MEDICARE

## 2023-08-07 VITALS
DIASTOLIC BLOOD PRESSURE: 82 MMHG | OXYGEN SATURATION: 99 % | SYSTOLIC BLOOD PRESSURE: 118 MMHG | WEIGHT: 144.8 LBS | HEART RATE: 68 BPM | HEIGHT: 68 IN | BODY MASS INDEX: 21.95 KG/M2

## 2023-08-07 DIAGNOSIS — K21.9 GASTROESOPHAGEAL REFLUX DISEASE WITHOUT ESOPHAGITIS: ICD-10-CM

## 2023-08-07 DIAGNOSIS — N40.0 BENIGN PROSTATIC HYPERPLASIA, UNSPECIFIED WHETHER LOWER URINARY TRACT SYMPTOMS PRESENT: ICD-10-CM

## 2023-08-07 DIAGNOSIS — N18.31 STAGE 3A CHRONIC KIDNEY DISEASE (HCC): ICD-10-CM

## 2023-08-07 DIAGNOSIS — R31.0 GROSS HEMATURIA: ICD-10-CM

## 2023-08-07 DIAGNOSIS — D69.6 PLATELETS DECREASED (HCC): ICD-10-CM

## 2023-08-07 DIAGNOSIS — E78.00 HYPERCHOLESTEREMIA: ICD-10-CM

## 2023-08-07 DIAGNOSIS — N20.0 NEPHROLITHIASIS: ICD-10-CM

## 2023-08-07 DIAGNOSIS — R63.4 WEIGHT LOSS: ICD-10-CM

## 2023-08-07 DIAGNOSIS — I48.91 ATRIAL FIBRILLATION, UNSPECIFIED TYPE (HCC): ICD-10-CM

## 2023-08-07 DIAGNOSIS — K22.70 BARRETT'S ESOPHAGUS WITHOUT DYSPLASIA: Primary | ICD-10-CM

## 2023-08-07 DIAGNOSIS — N28.1 RENAL CYST: ICD-10-CM

## 2023-08-07 PROBLEM — F41.9 ANXIETY: Status: RESOLVED | Noted: 2022-10-24 | Resolved: 2023-08-07

## 2023-08-07 PROCEDURE — 99214 OFFICE O/P EST MOD 30 MIN: CPT | Performed by: INTERNAL MEDICINE

## 2023-08-07 NOTE — ASSESSMENT & PLAN NOTE
Lab Results   Component Value Date    LDLCALC 82 01/06/2023     Lab Results   Component Value Date    ALT 24 05/05/2023     Lab Results   Component Value Date    CHOLESTEROL 154 01/06/2023    CHOLESTEROL 190 01/13/2022    CHOLESTEROL 167 06/14/2021     Lab Results   Component Value Date    HDL 57 01/06/2023    HDL 50 01/13/2022    HDL 56 06/14/2021     Lab Results   Component Value Date    TRIG 75 01/06/2023    TRIG 126 01/13/2022    TRIG 56 06/14/2021     Lab Results   Component Value Date    NONHDLC 97 01/06/2023    [address] 01/13/2022    [address] 111 06/14/2021     Continue pravastatin 10 mg daily.   Low-cholesterol diet

## 2023-08-07 NOTE — ASSESSMENT & PLAN NOTE
Ultrasound of the kidney noted. Lab data is as outlined underneath. Going for CT scan of kidney ordered by them. 5/5/2023: Creatinine 1.33, rest of the CMP normal with GFR 50, CBC normal, LDL 84,  5-:1. No hydronephrosis. Possible punctate nonobstructing right renal calculus. 2.   Multiple bilateral renal cyst

## 2023-08-07 NOTE — ASSESSMENT & PLAN NOTE
Nephrolithiasis, history of hematuria, BPH, renal cyst,: No further hematuria no UTI symptoms seen by urologist and not to do noted. Ultrasound of the kidney noted. Lab data is as outlined underneath. Going for CT scan of kidney ordered by them. 5/5/2023: Creatinine 1.33, rest of the CMP normal with GFR 50, CBC normal, LDL 84,  5-:1. No hydronephrosis. Possible punctate nonobstructing right renal calculus. 2.   Multiple bilateral renal cyst

## 2023-08-07 NOTE — ASSESSMENT & PLAN NOTE
GI: Shin's and GERD: Symptom-free no heartburn. Remains on famotidine tolerating without side effect.   Plan will be to continue same regimen    5/5/2023: Creatinine 1.33, rest of the CMP normal with GFR 50, CBC normal, LDL 84,

## 2023-08-07 NOTE — PROGRESS NOTES
Name: Adis Becerra      : 1945      MRN: [id number]  Encounter Provider: Cori Gilbert MD  Encounter Date: 2023   Encounter department: [address]     1. 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  Assessment & Plan:  GI: Shin's and GERD: Symptom-free no heartburn. Remains on famotidine tolerating without side effect. Plan will be to continue same regimen    2023: Creatinine 1.33, rest of the CMP normal with GFR 50, CBC normal, LDL 84,    Orders:  -     CBC; Future; Expected date: 2023    2. Gastroesophageal reflux disease without esophagitis  Assessment & Plan:  GI: Shin's and GERD: Symptom-free no heartburn. Remains on famotidine tolerating without side effect. Plan will be to continue same regimen    2023: Creatinine 1.33, rest of the CMP normal with GFR 50, CBC normal, LDL 84,      3. Atrial fibrillation, unspecified type [facility])  Assessment & Plan:  2023: Creatinine 1.33, rest of the CMP normal with GFR 50, CBC normal, LDL 84    Cardiac: Hypertension, paroxysmal atrial fibrillation, hyperlipidemia: Seen by cardiologist and notes reviewed relevant medications includes metoprolol succinate 25 mg daily, Xarelto 20 mg daily, tolerating without side effect. Also remains on pravastatin 10 mg daily. Recent CMP lipid profile unremarkable to the target. Denies any chest pain palpitation PND orthopnea symptom-free. Orders:  -     CBC; Future; Expected date: 2023    4. Stage 3a chronic kidney disease [facility])  Assessment & Plan:  Lab Results   Component Value Date    EGFR 50 2023    EGFR 51 2023    EGFR 44 10/24/2022    CREATININE 1.33 (H) 2023    CREATININE 1.31 (H) 2023    CREATININE 1.50 (H) 10/24/2022   GFR is baseline  Creat is baseline.    Electrolytes stable  Recommend periodic blood test for monitoring of  Renal Function, lytes, GFR and urine for MA/Creat  Avoid Non Steroidal Antiinflammatory such as ibuprofen, aleve etc.    Bone and Mineral disease monitoring as appropriate. All patient with GFR less than 30( CKD 4 or 5 or 6) are advised to follow with nephrologist.      Orders:  -     CBC; Future; Expected date: 11/07/2023  -     Comprehensive metabolic panel; Future; Expected date: 11/07/2023    5. Benign prostatic hyperplasia, unspecified whether lower urinary tract symptoms present  Assessment & Plan:  Nephrolithiasis, history of hematuria, BPH, renal cyst,: No further hematuria no UTI symptoms seen by urologist and not to do noted. Ultrasound of the kidney noted. Lab data is as outlined underneath. Going for CT scan of kidney ordered by them. 5/5/2023: Creatinine 1.33, rest of the CMP normal with GFR 50, CBC normal, LDL 84,  5-:1. No hydronephrosis. Possible punctate nonobstructing right renal calculus. 2. Multiple bilateral renal cyst      6. Gross hematuria  Assessment & Plan:  Nephrolithiasis, history of hematuria, BPH, renal cyst,: No further hematuria no UTI symptoms seen by urologist and not to do noted. Ultrasound of the kidney noted. Lab data is as outlined underneath. Going for CT scan of kidney ordered by them. 5/5/2023: Creatinine 1.33, rest of the CMP normal with GFR 50, CBC normal, LDL 84,  5-:1. No hydronephrosis. Possible punctate nonobstructing right renal calculus. 2. Multiple bilateral renal cyst      7.  Hypercholesteremia  Assessment & Plan:  Lab Results   Component Value Date    LDLCALC 82 01/06/2023     Lab Results   Component Value Date    ALT 24 05/05/2023     Lab Results   Component Value Date    CHOLESTEROL 154 01/06/2023    CHOLESTEROL 190 01/13/2022    CHOLESTEROL 167 06/14/2021     Lab Results   Component Value Date    HDL 57 01/06/2023    HDL 50 01/13/2022    HDL 56 06/14/2021     Lab Results   Component Value Date    TRIG 75 01/06/2023    TRIG 126 01/13/2022    TRIG 56 06/14/2021     Lab Results   Component Value Date [address] 97 01/06/2023    [address] 140 01/13/2022    [address] 111 06/14/2021     Continue pravastatin 10 mg daily. Low-cholesterol diet      Orders:  -     Lipid panel; Future; Expected date: 11/07/2023  -     Comprehensive metabolic panel; Future; Expected date: 11/07/2023    8. Platelets decreased ([address])  Assessment & Plan:  Lab Results   Component Value Date    WBC 7.21 05/05/2023    HGB 13.7 05/05/2023    HCT 41.4 05/05/2023    MCV 91 05/05/2023     05/05/2023         Orders:  -     CBC; Future; Expected date: 11/07/2023    9. Weight loss  Assessment & Plan:  Weight holding at this time. 10. Renal cyst  Assessment & Plan:    Ultrasound of the kidney noted. Lab data is as outlined underneath. Going for CT scan of kidney ordered by them. 5/5/2023: Creatinine 1.33, rest of the CMP normal with GFR 50, CBC normal, LDL 84,  5-:1. No hydronephrosis. Possible punctate nonobstructing right renal calculus. 2. Multiple bilateral renal cyst      11. Nephrolithiasis  Assessment & Plan:  Nephrolithiasis, history of hematuria, BPH, renal cyst,: No further hematuria no UTI symptoms seen by urologist and not to do noted. Ultrasound of the kidney noted. Lab data is as outlined underneath. Going for CT scan of kidney ordered by them. 5/5/2023: Creatinine 1.33, rest of the CMP normal with GFR 50, CBC normal, LDL 84,  5-:1. No hydronephrosis. Possible punctate nonobstructing right renal calculus. 2. Multiple bilateral renal cyst             Subjective             Here for chronic disease management as well as the Medicare wellness as well as a new complaint of hematuria. Nephrolithiasis, history of hematuria, BPH, renal cyst,: No further hematuria no UTI symptoms seen by urologist and not to do noted. Ultrasound of the kidney noted. Lab data is as outlined underneath. Going for CT scan of kidney ordered by them. Weight seems to be holding.   630 weight appears inaccurate              A Cologuard 06/11/2021 Negative. Colonoscopy in 3/2022: 1. Single colon polyp removed. 2. Extensive diverticulosis. 1/6/2023: GFR 51 rest of the CMP normal creatinine 1.31 LDL 82,  10/24/2022: CBC normal, PTH 53, calcium 9.1, phosphorus 2.8,  08/29/2022:  CBC normal except platelet 649 is CMP normal except creatinine 1.72 GFR 37 magnesium 1.9 proBNP 694 D-dimer 0.74 TSH 3.72 troponin unremarkable  08/03/2022:  Stress test see full report        5/5/2023: Creatinine 1.33, rest of the CMP normal with GFR 50, CBC normal, LDL 84,  5-:1. No hydronephrosis. Possible punctate nonobstructing right renal calculus. 2. Multiple bilateral renal cyst    Review of Systems   Constitutional: Negative for chills, fatigue and fever. HENT: Negative for ear pain, sore throat and trouble swallowing. Eyes: Negative for pain and visual disturbance. Respiratory: Negative for cough and shortness of breath. Cardiovascular: Negative for chest pain and palpitations. Gastrointestinal: Negative for abdominal pain, constipation, diarrhea and vomiting. Genitourinary: Positive for frequency. Negative for decreased urine volume, difficulty urinating, dysuria, hematuria, testicular pain and urgency. Musculoskeletal: Negative for arthralgias, back pain and myalgias. Skin: Negative for color change and rash. Neurological: Negative for dizziness, seizures, syncope and headaches. Psychiatric/Behavioral: Negative for sleep disturbance. The patient is not nervous/anxious. All other systems reviewed and are negative.       Past Medical History:   Diagnosis Date   • A-fib ([address]) 08/28/2022   • Shin's esophagus    • BPH (benign prostatic hyperplasia)    • Colon polyp    • GERD (gastroesophageal reflux disease)    • Herpes    • Hyperlipidemia    • Irregular heart beat    • Kidney stone      Past Surgical History:   Procedure Laterality Date   • CATARACT EXTRACTION     • COLONOSCOPY     • EGD AND COLONOSCOPY     • LITHOTRIPSY Family History   Problem Relation Age of Onset   • No Known Problems Mother    • No Known Problems Father      Social History     Socioeconomic History   • Marital status:      Spouse name: None   • Number of children: None   • Years of education: None   • Highest education level: None   Occupational History   • None   Tobacco Use   • Smoking status: Never   • Smokeless tobacco: Never   Vaping Use   • Vaping Use: Never used   Substance and Sexual Activity   • Alcohol use: Yes     Alcohol/week: 1.0 - 2.0 standard drink of alcohol     Types: 1 - 2 Glasses of wine per week     Comment: occas   • Drug use: No   • Sexual activity: None   Other Topics Concern   • None   Social History Narrative   • None     Social Determinants of Health     Financial Resource Strain: Low Risk  (5/5/2023)    Overall Financial Resource Strain (CARDIA)    • Difficulty of Paying Living Expenses: Not hard at all   Food Insecurity: Not on file   Transportation Needs: No Transportation Needs (5/5/2023)    PRAPARE - Transportation    • Lack of Transportation (Medical): No    • Lack of Transportation (Non-Medical):  No   Physical Activity: Not on file   Stress: Not on file   Social Connections: Not on file   Intimate Partner Violence: Not on file   Housing Stability: Not on file     Current Outpatient Medications on File Prior to Visit   Medication Sig   • cholecalciferol (VITAMIN D3) 1,000 units tablet Take 1,000 Units by mouth daily   • famotidine (PEPCID) 40 MG tablet Take 1 tablet (40 mg total) by mouth daily   • metoprolol succinate (TOPROL-XL) 25 mg 24 hr tablet Take 1 tablet by mouth once daily   • pravastatin (PRAVACHOL) 10 mg tablet Take 1 tablet (10 mg total) by mouth daily   • rivaroxaban (Xarelto) 20 mg tablet Take 1 tablet (20 mg total) by mouth daily with breakfast   • tadalafil (CIALIS) 20 MG tablet Take 1 tablet (20 mg total) by mouth daily as needed for erectile dysfunction   • tamsulosin (FLOMAX) 0.4 mg Take 1 capsule (0.4 mg total) by mouth daily with dinner   • vitamin B-12 (VITAMIN B-12) 1,000 mcg tablet Take 1,000 mcg by mouth daily   • [DISCONTINUED] apixaban (Eliquis) 5 mg Take 1 tablet (5 mg total) by mouth 2 (two) times a day     No Known Allergies  Immunization History   Administered Date(s) Administered   • COVID-19 MODERNA VACC 0.25 ML IM BOOSTER 12/17/2021   • COVID-19 MODERNA VACC 0.5 ML IM 02/05/2021, 03/05/2021, 12/17/2021   • INFLUENZA 10/24/2022   • Influenza Split High Dose Preservative Free IM 10/18/2019   • Influenza, high dose seasonal 0.7 mL 09/18/2020, 10/05/2021, 10/24/2022   • Tdap 05/27/2021       Objective     /82   Pulse 68   Ht 5' 8" (1.727 m)   Wt 65.7 kg (144 lb 12.8 oz)   SpO2 99%   BMI 22.02 kg/m²     Physical Exam  Vitals and nursing note reviewed. Constitutional:       Appearance: Normal appearance. He is well-developed. He is not ill-appearing. HENT:      Head: Normocephalic and atraumatic. Right Ear: External ear normal.      Left Ear: External ear normal.      Nose: Nose normal. No congestion or rhinorrhea. Mouth/Throat:      Pharynx: Oropharynx is clear. No posterior oropharyngeal erythema. Eyes:      General: Lids are normal. No scleral icterus. Conjunctiva/sclera: Conjunctivae normal.   Neck:      Thyroid: No thyroid mass or thyromegaly. Vascular: No JVD. Trachea: Trachea normal.   Cardiovascular:      Rate and Rhythm: Normal rate and regular rhythm. Heart sounds: Normal heart sounds. No murmur heard. Pulmonary:      Effort: Pulmonary effort is normal. No respiratory distress. Abdominal:      General: Bowel sounds are normal. There is no distension. Tenderness: There is no abdominal tenderness. Musculoskeletal:      Right lower leg: No edema. Left lower leg: No edema. Skin:     General: Skin is warm. Coloration: Skin is not jaundiced or pale. Findings: No bruising.    Neurological:      General: No focal deficit present. Mental Status: He is alert and oriented to person, place, and time. Sensory: No sensory deficit. Motor: No weakness. Gait: Gait normal.   Psychiatric:         Mood and Affect: Mood normal.         Behavior: Behavior normal.         Thought Content:  Thought content normal.         Judgment: Judgment normal.       Sarah Saucedo MD

## 2023-08-07 NOTE — ASSESSMENT & PLAN NOTE
Lab Results   Component Value Date    WBC 7.21 05/05/2023    HGB 13.7 05/05/2023    HCT 41.4 05/05/2023    MCV 91 05/05/2023     05/05/2023

## 2023-08-07 NOTE — PATIENT INSTRUCTIONS
Follow with Consultants as per their and our suggestion    Follow up in 12 week(s) or as needed earlier    Follow all instructions as advised and discussed. Take your medications as prescribed. Call the office immediately if you experience any side effects. Ask questions if you do not understand. Keep your scheduled appointment as advised or come sooner if necessary or in doubt. Best time to call for non-urgent matter or questions on weekdays is between 9am and 12 noon. See physician for any new symptoms or worsening of current symptoms. Urgent or emergent situations call 911 and report to nearest emergency room. I spent  time taking care of this patient including clinical care, conseling, collaboration, chart, lab and consultant's follow up note,images report, documentation, pre visit  review as appropriate.     Patient is to get labs 1 week(s) prior to next visit if advised

## 2023-08-07 NOTE — ASSESSMENT & PLAN NOTE
5/5/2023: Creatinine 1.33, rest of the CMP normal with GFR 50, CBC normal, LDL 84    Cardiac: Hypertension, paroxysmal atrial fibrillation, hyperlipidemia: Seen by cardiologist and notes reviewed relevant medications includes metoprolol succinate 25 mg daily, Xarelto 20 mg daily, tolerating without side effect. Also remains on pravastatin 10 mg daily. Recent CMP lipid profile unremarkable to the target. Denies any chest pain palpitation PND orthopnea symptom-free.

## 2023-08-07 NOTE — ASSESSMENT & PLAN NOTE
Lab Results   Component Value Date    EGFR 50 05/05/2023    EGFR 51 01/06/2023    EGFR 44 10/24/2022    CREATININE 1.33 (H) 05/05/2023    CREATININE 1.31 (H) 01/06/2023    CREATININE 1.50 (H) 10/24/2022   GFR is baseline  Creat is baseline. Electrolytes stable  Recommend periodic blood test for monitoring of  Renal Function, lytes, GFR and urine for MA/Creat  Avoid Non Steroidal Antiinflammatory such as ibuprofen, aleve etc.    Bone and Mineral disease monitoring as appropriate.     All patient with GFR less than 30( CKD 4 or 5 or 6) are advised to follow with nephrologist.

## 2023-08-14 ENCOUNTER — TELEPHONE (OUTPATIENT)
Dept: CARDIOLOGY CLINIC | Facility: CLINIC | Age: 78
End: 2023-08-14

## 2023-08-14 NOTE — TELEPHONE ENCOUNTER
He should drink more fluid. He can have liberal salt intake. It is okay to have him bring to office to do an EKG and he can take an extra metoprolol if it sinus tachycardia and blood pressure is acceptable.

## 2023-08-14 NOTE — TELEPHONE ENCOUNTER
bp 88/55 hr 140 last night  Heart rate felt high yesterday when he went to bed, not doing anything physical today, he reports under stress, heart rate now is 130. Patient reports slight lightheadedness, and not staying hydrated. Denies chest pain, shortness of breath at this time. Patient wanted to know what to do. He thinks he recalls being told to take an additional metoprolol in this case but wanted to confirm.

## 2023-08-30 ENCOUNTER — APPOINTMENT (OUTPATIENT)
Dept: LAB | Facility: CLINIC | Age: 78
End: 2023-08-30
Payer: MEDICARE

## 2023-08-30 DIAGNOSIS — R31.0 GROSS HEMATURIA: ICD-10-CM

## 2023-08-30 DIAGNOSIS — N20.0 NEPHROLITHIASIS: ICD-10-CM

## 2023-08-30 DIAGNOSIS — N40.0 BENIGN PROSTATIC HYPERPLASIA, UNSPECIFIED WHETHER LOWER URINARY TRACT SYMPTOMS PRESENT: ICD-10-CM

## 2023-08-30 LAB
CREAT SERPL-MCNC: 1.27 MG/DL (ref 0.6–1.3)
GFR SERPL CREATININE-BSD FRML MDRD: 53 ML/MIN/1.73SQ M

## 2023-08-30 PROCEDURE — 82565 ASSAY OF CREATININE: CPT

## 2023-08-30 PROCEDURE — 36415 COLL VENOUS BLD VENIPUNCTURE: CPT

## 2023-09-12 DIAGNOSIS — N40.0 BENIGN PROSTATIC HYPERPLASIA, UNSPECIFIED WHETHER LOWER URINARY TRACT SYMPTOMS PRESENT: ICD-10-CM

## 2023-09-12 RX ORDER — TAMSULOSIN HYDROCHLORIDE 0.4 MG/1
0.4 CAPSULE ORAL
Qty: 90 CAPSULE | Refills: 1 | Status: SHIPPED | OUTPATIENT
Start: 2023-09-12

## 2023-09-12 NOTE — TELEPHONE ENCOUNTER
Reason for call:   [x] Refill   [] Prior Auth  [] Other:     Office:   [x] PCP/Provider - Christopher Lockhart  [] Speciality/Provider -     Medication: tamsulosin (FLOMAX    Dose:  0.4 mg    Quantity: 90 capsules 1 refills     Pharmacy:   [facility]    Does the patient have enough for 3 days? [x] Yes   [] No - Send as HP to POD    Is the patient having symptoms?    [] No   [x] Yes -

## 2023-09-13 ENCOUNTER — HOSPITAL ENCOUNTER (OUTPATIENT)
Dept: RADIOLOGY | Facility: HOSPITAL | Age: 78
Discharge: HOME | End: 2023-09-13
Payer: MEDICARE

## 2023-09-13 DIAGNOSIS — R31.0 GROSS HEMATURIA: ICD-10-CM

## 2023-09-13 DIAGNOSIS — N40.0 BENIGN PROSTATIC HYPERPLASIA, UNSPECIFIED WHETHER LOWER URINARY TRACT SYMPTOMS PRESENT: ICD-10-CM

## 2023-09-13 DIAGNOSIS — N20.0 NEPHROLITHIASIS: ICD-10-CM

## 2023-09-13 PROCEDURE — 74178 CT ABD&PLV WO CNTR FLWD CNTR: CPT

## 2023-09-13 PROCEDURE — G1004 CDSM NDSC: HCPCS

## 2023-09-13 RX ADMIN — IOHEXOL 100 ML: 350 INJECTION, SOLUTION INTRAVENOUS at 07:53

## 2023-10-05 ENCOUNTER — PROCEDURE VISIT (OUTPATIENT)
Dept: UROLOGY | Facility: CLINIC | Age: 78
End: 2023-10-05
Payer: MEDICARE

## 2023-10-05 VITALS
RESPIRATION RATE: 16 BRPM | WEIGHT: 144 LBS | BODY MASS INDEX: 21.82 KG/M2 | OXYGEN SATURATION: 99 % | SYSTOLIC BLOOD PRESSURE: 148 MMHG | DIASTOLIC BLOOD PRESSURE: 62 MMHG | HEIGHT: 68 IN | HEART RATE: 73 BPM

## 2023-10-05 DIAGNOSIS — N20.1 LEFT URETERAL CALCULUS: ICD-10-CM

## 2023-10-05 DIAGNOSIS — R31.0 GROSS HEMATURIA: Primary | ICD-10-CM

## 2023-10-05 PROCEDURE — 99214 OFFICE O/P EST MOD 30 MIN: CPT | Performed by: UROLOGY

## 2023-10-05 PROCEDURE — 87086 URINE CULTURE/COLONY COUNT: CPT | Performed by: UROLOGY

## 2023-10-05 PROCEDURE — 52000 CYSTOURETHROSCOPY: CPT | Performed by: UROLOGY

## 2023-10-05 NOTE — PATIENT INSTRUCTIONS
Elisa Edgar    Was recently evaluated by me for gross hematuria and BPH. He was also diagnosed with 2 sizable left distal ureteral calculi on CT measuring 13 mm and 7 mm respectively, present in tandem in the distal left ureter. He is rather asymptomatic from a BPH standpoint but cystoscopy demonstrates significant intravesical prostatic protrusion. My recommendation was for left ureteroscopy and we discussed that a partial TURP may be required in order to access the ureteral orifice. Patient is moving to [place] and I am providing this letter to expedite care once he establishes with your practice.     Please not hesitate contact if I can answer any additional questions    Noah Williamson MD,PhD  [facility]  [phone]

## 2023-10-05 NOTE — PROGRESS NOTES
Referring Physician: Mark Villanueva MD  A copy of this note was sent to the referring physician. Diagnoses and all orders for this visit:    Gross hematuria  -     Cystoscopy  -     Urine culture    Left ureteral calculus  -     Cystoscopy  -     Urine culture            Assessment and plan:       1. Gross hematuria  -Self resolved  -Persistent microscopic hematuria is noted  - Asymptomatic    2. BPH  -On tamsulosin times years  -Generally doing well but patient is interested in considering additional options (daily Cialis was not efficacious in the past)  -Volume BPH with significant intravesical prostatic protrusion, not a candidate for minimally invasive interventions  -May require a targeted TURP in order to access his ureteral orifice based on the scopic findings performed today    3 2 left distal ureteral calculi and tandem greater than 1.5 cm in aggregate    #3 medical comorbidities: Atrial fibrillation on chronic anticoagulation, stage III CKD    4  Health/wellness  -Avid hiker, spends significant time outdoors    I recommendation was for a left ureteroscopy. We discussed that a targeted TURP may be required at the time of the procedure    Discussed options for management of the patient's ureteral stone. We discussed surgical options including ureteroscopy and shock wave lithotripsy. In addition, we discussed conservative management with medical expulsive therapy. The patient has elected to undergo ureteroscopy. I discussed with the patients risks and benefits and alternatives to ureteroscopy with laser lithotripsy. The risks include bleeding, infection, injury to the urethra, bladder, ureter or kidney, risk of a staged procedure, risk of stricture, risk of residual fragments, risk of loss of kidney, risks of anesthesia including DVT, PE, MI and death. The patient understands that a ureteral stent will likely be left in place at the time of the procedure.   We reviewed the expected postoperative care. I told the patient he should plan for 2-week recovery time and will likely have a catheter for 4 days following the procedure    Nakul Mccracken is moving out of [place] in the next 4 weeks. He elects to defer management until he establishes care with a urologist in his new home state of [place]. I think this is very reasonable as he is generally asymptomatic from his stone at the present time    I provided him with a personalized letter to bring to his new urologist outlining our findings and my recommendations for plan of care. He was provided with a copy of his recent CT and all office notes as well. Thor Salcedo MD      Chief Complaint     Hematuria, BPH      History of Present Illness     Abilio Patel is a 65 y.o. furred in consultation for gross hematuria, known BPH    Detailed Urologic History     - please refer to HPI    Review of Systems     Review of Systems   Constitutional: Negative for activity change and fatigue. HENT: Negative for congestion. Eyes: Negative for visual disturbance. Respiratory: Negative for shortness of breath and wheezing. Cardiovascular: Negative for chest pain and leg swelling. Gastrointestinal: Negative for abdominal pain. Endocrine: Negative for polyuria. Genitourinary: Negative for dysuria, flank pain, hematuria and urgency. Musculoskeletal: Negative for back pain. Allergic/Immunologic: Negative for immunocompromised state. Neurological: Negative for dizziness and numbness. Psychiatric/Behavioral: Negative for dysphoric mood. All other systems reviewed and are negative.     AUA SYMPTOM SCORE    Flowsheet Row Most Recent Value   AUA SYMPTOM SCORE    How often have you had a sensation of not emptying your bladder completely after you finished urinating? 3 (P)     How often have you had to urinate again less than two hours after you finished urinating? 3 (P)     How often have you found you stopped and started again several times when you urinate? 3 (P)     How often have you found it difficult to postpone urination? 4 (P)     How often have you had a weak urinary stream? 5 (P)     How often have you had to push or strain to begin urination? 0 (P)     How many times did you most typically get up to urinate from the time you went to bed at night until the time you got up in the morning? 5 (P)     Quality of Life: If you were to spend the rest of your life with your urinary condition just the way it is now, how would you feel about that? 4 (P)     AUA SYMPTOM SCORE 23 (P)             Allergies     No Known Allergies    Physical Exam       Physical Exam  Constitutional:       General: He is not in acute distress. Appearance: He is well-developed. HENT:      Head: Normocephalic and atraumatic. Cardiovascular:      Comments: Negative lower extremity edema  Pulmonary:      Effort: Pulmonary effort is normal.      Breath sounds: Normal breath sounds. Abdominal:      Palpations: Abdomen is soft. Musculoskeletal:         General: Normal range of motion. Cervical back: Normal range of motion. Skin:     General: Skin is warm. Neurological:      Mental Status: He is alert and oriented to person, place, and time.    Psychiatric:         Behavior: Behavior normal.           Vital Signs  Vitals:    10/05/23 0847   BP: 148/62   BP Location: Right arm   Patient Position: Sitting   Cuff Size: Large   Pulse: 73   Resp: 16   SpO2: 99%   Weight: 65.3 kg (144 lb)   Height: 5' 8" (1.727 m)         Current Medications       Current Outpatient Medications:   •  cholecalciferol (VITAMIN D3) 1,000 units tablet, Take 1,000 Units by mouth daily, Disp: , Rfl:   •  famotidine (PEPCID) 40 MG tablet, Take 1 tablet (40 mg total) by mouth daily, Disp: 90 tablet, Rfl: 1  •  metoprolol succinate (TOPROL-XL) 25 mg 24 hr tablet, Take 1 tablet by mouth once daily, Disp: 90 tablet, Rfl: 3  •  pravastatin (PRAVACHOL) 10 mg tablet, Take 1 tablet (10 mg total) by mouth daily, Disp: 90 tablet, Rfl: 3  •  rivaroxaban (Xarelto) 20 mg tablet, Take 1 tablet (20 mg total) by mouth daily with breakfast, Disp: 90 tablet, Rfl: 1  •  tadalafil (CIALIS) 20 MG tablet, Take 1 tablet (20 mg total) by mouth daily as needed for erectile dysfunction, Disp: 10 tablet, Rfl: 2  •  tamsulosin (FLOMAX) 0.4 mg, Take 1 capsule (0.4 mg total) by mouth daily with dinner, Disp: 90 capsule, Rfl: 1  •  vitamin B-12 (VITAMIN B-12) 1,000 mcg tablet, Take 1,000 mcg by mouth daily, Disp: , Rfl:       Active Problems     Patient Active Problem List   Diagnosis   • Stage 3a chronic kidney disease (HCC)   • Nephrolithiasis   • BPH (benign prostatic hyperplasia)   • Shin esophagus   • Hypercholesteremia   • Elevated blood pressure reading   • Abnormal EKG   • Platelets decreased (HCC)   • Vitamin D insufficiency   • Renal cyst   • GERD (gastroesophageal reflux disease)   • Atrial fibrillation (HCC)   • Personal history of colonic polyps   • Weight loss   • Other male erectile dysfunction   • Gross hematuria         Past Medical History     Past Medical History:   Diagnosis Date   • A-fib ([address] [facility]) 08/28/2022   • Shin's esophagus    • BPH (benign prostatic hyperplasia)    • Colon polyp    • GERD (gastroesophageal reflux disease)    • Herpes    • Hyperlipidemia    • Irregular heart beat    • Kidney stone          Surgical History     Past Surgical History:   Procedure Laterality Date   • CATARACT EXTRACTION     • COLONOSCOPY     • EGD AND COLONOSCOPY     • LITHOTRIPSY           Family History     Family History   Problem Relation Age of Onset   • No Known Problems Mother    • No Known Problems Father          Social History     Social History     Social History     Tobacco Use   Smoking Status Never   Smokeless Tobacco Never         Pertinent Lab Values     Lab Results   Component Value Date    CREATININE 1.27 08/30/2023       Lab Results   Component Value Date    PSA 1.2 03/06/2020    PSA 1.3 08/27/2019    PSA 2.1 07/18/2018       @RESULTRCNT(1H])@      Pertinent Imaging       US kidney and bladder with pvr    Result Date: 5/12/2023  Narrative: RENAL ULTRASOUND WITH PVR INDICATION:   R31.0: Gross hematuria N40.0: Benign prostatic hyperplasia without lower urinary tract symptoms N20.0: Calculus of kidney. COMPARISON: Renal ultrasound 1/20/2016 TECHNIQUE:   Ultrasound of the retroperitoneum was performed with a curvilinear transducer utilizing volumetric sweeps and still imaging techniques. FINDINGS: KIDNEYS: Symmetric and normal size. Right kidney:  9.6 x 5.1 x 5.1 cm. Volume 129.1 mL Left kidney:  9.9 x 4.3 x 4.1 cm. Volume 91.8 mL Right kidney Normal echogenicity and contour. No suspicious mass is identified. Simple cyst in the midportion measuring 1.6 x 1.8 x 1.7 cm. Simple cyst in the midportion measuring 0.7 x 0.9 x 0.8 cm. No hydronephrosis. Echogenic focus in the midportion with twinkle artifact without clear acoustic shadowing could be due to a tiny nonobstructing calculus. No perinephric fluid collections. Left kidney Normal echogenicity and contour. No suspicious mass is identified. Simple cyst in the upper pole measuring 1.9 x 1.8 x 1.7 cm. Simple cyst in the upper pole measuring 1.8 x 1.7 x 1.2 cm. Cyst in the upper pole to midportion with a thin septation measuring 1.2 x 1.2 x 1.2 cm. No hydronephrosis. No shadowing calculi. No perinephric fluid collections. URETERS: Nonvisualized. BLADDER: Normally distended. No focal thickening or mass lesions. Bilateral ureteral jets detected. Prevoid: 112.3 No significant post void volume. Measured post void volume in mL: 32.5 OTHER: Enlarged prostate gland measuring 4.6 x 5.2 x 4.5 cm (57.2 mL). Impression: 1. No hydronephrosis. Possible punctate nonobstructing right renal calculus. 2.  Multiple bilateral renal cysts. Workstation performed: [id number]         Portions of the record may have been created with voice recognition software.   Occasional wrong word or "sound a like" substitutions may have occurred due to the inherent limitations of voice recognition software. In addition some of the content generated from this outpatient encounter includes information designed for patient education and/or communication back to the referring provider. Read the chart carefully and recognize, using context, where substitutions have occurred.

## 2023-10-05 NOTE — PROGRESS NOTES
Cystoscopy     Date/Time 10/5/2023 9:00 AM     Performed by  Juan Luis Mccullough MD   Authorized by Juan Luis Mccullough MD         Procedure Details:  Procedure type: cystoscopy       Office Cystoscopy Procedure Note    Indication:    BPH, ureteral calculus, for surgical planning    Informed consent   The risks, benefits, complications, treatment options, and expected outcomes were discussed with the patient. The patient concurred with the proposed plan and provided informed consent. Anesthesia  Lidocaine jelly 2%    Procedure  The patient was placed in the supineposition, was prepped and draped in the usual manner using sterile technique, and 2% lidocaine jelly instilled into the urethra. A 17 F flexible cystoscope was then inserted into the urethra and the urethra and bladder carefully examined. The following findings were noted:    Findings:  Urethra:  Normal  Prostate:  Significant bilobar hyperplasia with a markedly elevated bladder neck and intravesical prostatic protrusion. Not a candidate for UroLift and my assessment  Bladder:  Normal  Ureteral orifices:  Unable to visualize the ureteral orifice due to high volume gland. May require a targeted TURP in order to access the ureteral orifice for upcoming ureteroscopy  Other findings:  None     Specimens: None                 Complications:    None; patient tolerated the procedure well           Disposition: To home after 30 minute observation.            Condition: Stable

## 2023-10-06 LAB — BACTERIA UR CULT: NORMAL

## 2023-10-13 DIAGNOSIS — R12 HEART BURN: ICD-10-CM

## 2023-10-13 DIAGNOSIS — I48.91 ATRIAL FIBRILLATION (HCC): ICD-10-CM

## 2023-10-13 RX ORDER — FAMOTIDINE 40 MG/1
40 TABLET, FILM COATED ORAL DAILY
Qty: 90 TABLET | Refills: 1 | Status: SHIPPED | OUTPATIENT
Start: 2023-10-13

## 2023-10-13 NOTE — TELEPHONE ENCOUNTER
Reason for call:   [x] Refill   [] Prior Auth  [] Other:     Office:   [x] PCP/Provider - Veronique Contreras   [] Speciality/Provider -     Medication: Famotidine     Dose/Frequency: 40mg QD    Quantity: 90    Pharmacy: [facility]    Does the patient have enough for 3 days?    [x] Yes   [] No - Send as HP to POD

## 2023-11-01 ENCOUNTER — TELEPHONE (OUTPATIENT)
Dept: CARDIOLOGY CLINIC | Facility: CLINIC | Age: 78
End: 2023-11-01

## 2023-11-01 NOTE — TELEPHONE ENCOUNTER
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

## 2024-03-21 ENCOUNTER — TELEPHONE (OUTPATIENT)
Dept: GASTROENTEROLOGY | Facility: CLINIC | Age: 79
End: 2024-03-21